# Patient Record
Sex: FEMALE | Race: WHITE | NOT HISPANIC OR LATINO | Employment: STUDENT | URBAN - METROPOLITAN AREA
[De-identification: names, ages, dates, MRNs, and addresses within clinical notes are randomized per-mention and may not be internally consistent; named-entity substitution may affect disease eponyms.]

---
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
## 2024-01-04 ENCOUNTER — OFFICE VISIT (OUTPATIENT)
Dept: OBGYN | Facility: CLINIC | Age: 19
End: 2024-01-04
Attending: INTERNAL MEDICINE
Payer: COMMERCIAL

## 2024-01-04 VITALS — BODY MASS INDEX: 19.11 KG/M2 | SYSTOLIC BLOOD PRESSURE: 104 MMHG | WEIGHT: 109.6 LBS | DIASTOLIC BLOOD PRESSURE: 70 MMHG

## 2024-01-04 DIAGNOSIS — N92.0 MENORRHAGIA WITH REGULAR CYCLE: ICD-10-CM

## 2024-01-04 DIAGNOSIS — N94.6 DYSMENORRHEA: Primary | ICD-10-CM

## 2024-01-04 LAB
ERYTHROCYTE [DISTWIDTH] IN BLOOD BY AUTOMATED COUNT: 12.8 % (ref 10–15)
HCG UR QL: NEGATIVE
HCT VFR BLD AUTO: 39 % (ref 35–47)
HGB BLD-MCNC: 13.5 G/DL (ref 11.7–15.7)
MCH RBC QN AUTO: 32.5 PG (ref 26.5–33)
MCHC RBC AUTO-ENTMCNC: 34.6 G/DL (ref 31.5–36.5)
MCV RBC AUTO: 94 FL (ref 78–100)
PLATELET # BLD AUTO: 219 10E3/UL (ref 150–450)
RBC # BLD AUTO: 4.16 10E6/UL (ref 3.8–5.2)
WBC # BLD AUTO: 10.2 10E3/UL (ref 4–11)

## 2024-01-04 PROCEDURE — 99204 OFFICE O/P NEW MOD 45 MIN: CPT | Performed by: OBSTETRICS & GYNECOLOGY

## 2024-01-04 PROCEDURE — 36415 COLL VENOUS BLD VENIPUNCTURE: CPT | Performed by: OBSTETRICS & GYNECOLOGY

## 2024-01-04 PROCEDURE — 84146 ASSAY OF PROLACTIN: CPT | Performed by: OBSTETRICS & GYNECOLOGY

## 2024-01-04 PROCEDURE — 81025 URINE PREGNANCY TEST: CPT | Performed by: OBSTETRICS & GYNECOLOGY

## 2024-01-04 PROCEDURE — 80053 COMPREHEN METABOLIC PANEL: CPT | Performed by: OBSTETRICS & GYNECOLOGY

## 2024-01-04 PROCEDURE — 85027 COMPLETE CBC AUTOMATED: CPT | Performed by: OBSTETRICS & GYNECOLOGY

## 2024-01-04 PROCEDURE — 84443 ASSAY THYROID STIM HORMONE: CPT | Performed by: OBSTETRICS & GYNECOLOGY

## 2024-01-04 RX ORDER — LEVONORGESTREL / ETHINYL ESTRADIOL AND ETHINYL ESTRADIOL 150-30(84)
1 KIT ORAL DAILY
Qty: 84 TABLET | Refills: 4 | Status: SHIPPED | OUTPATIENT
Start: 2024-01-04 | End: 2024-03-13

## 2024-01-04 NOTE — PROGRESS NOTES
"SUBJECTIVE:   CC: Menorrhagia                                               Aniyah Curry is a 18 year old  female who presents to clinic today for heavy menses and \"flare up\" of multiple symptoms.    - menarche age 14, regular monthly, moderate flow  - Summer 2023 suddenly changing super tampon ever 30 min, heavy periods, and flare of various symptoms: presyncope, palpitations, hot flashes, dizzy, purple discoloration of feet, ankle swelling, orthostatic tachycardia/bradycardia, chest pain   - orthostatic changes occur outside of premenstrual time period as well  - severe dysmenorrhea starting in 2023, started taking Advil with no help  - has not yet started the oral contraceptives prescribed by Dr. Albarado  - no lifestyle, weight, or medication changes prior to initiation of HUB  - not currently sexually active, previously used condoms    - Patient's last menstrual period was 2024.  12/5-12/10/23. Heaviest day is day 1.     Hgb 12.7 23  TSH 3.56, T4 1.67 23, seeing Endocrine outside [facility], not prescribed medication at this time. Endocrine suspect Hashimotos.    Problem list and histories reviewed & adjusted, as indicated.  Additional history: as documented.      Patient Active Problem List   Diagnosis    Other congenital deformity of hip (joint)    Family history of celiac disease    Benign joint hypermobility    Scoliosis    Seasonal allergies    PFO (patent foramen ovale)    NSVT (nonsustained ventricular tachycardia) (H)     Past Surgical History:   Procedure Laterality Date    APPENDECTOMY      ORTHOPEDIC SURGERY Left 2022    labrial tear repair      Social History     Tobacco Use    Smoking status: Never    Smokeless tobacco: Never   Substance Use Topics    Alcohol use: No      Problem (# of Occurrences) Relation (Name,Age of Onset)    Anxiety Disorder (1) Paternal Grandfather (Kobe Curry)    Unknown/Adopted (1) Mother (Radha Curry)    Anesthesia Reaction (1) Mother " (Radha Curry)    Asthma (1) Brother (Bladimir Curry)    Genetic Disorder (2) Brother (Bladimir Curry): Celiac Disease, Cousin (Stacy Cantor): JRA    Gastrointestinal Disease (1) Brother (5): celiac disease    Hypertension (1) Paternal Grandfather (Kobe Curry)    Cerebrovascular Disease (1) Maternal Grandfather (Vahid Cantor): 2014    Thyroid Disease (5) Mother (Radha Curry): Hashimoto's, Father (Lc Curry): Hypothyroidism and vitiligo, Maternal Grandmother (Laura Curry): Hypothyroidism, Paternal Grandmother (Jania Cantor): Hyposthyroidism, Brother (Bladimir Curry): Autoimmune Hypothyroidism    Breast Cancer (1) Other (Estela Cantor): Aunt    Prostate Cancer (1) Maternal Grandfather (Vahid Cantor)    Coronary Artery Disease (1) Paternal Grandfather (Kobe Curry)    Hypothyroidism (5) Mother (Radha Curry), Father (Lc Curry), Maternal Grandmother (Laura Curry), Paternal Grandmother (Jania Cantor), Brother              azelaic acid (FINACIA) 15 % external gel, APPLY THIN LAYER TO FACE 1-2X DAILY  metroNIDAZOLE (METROCREAM) 0.75 % external cream,   SULFACLEANSE 8/4 8-4 % SUSP, WASH ENTIRE FACE 1-2X DAILY. LATHER AND LET SIT 1-2 MINUTES BEFORE RINSING.  levonorgestrel-ethinyl estradiol (AVIANE) 0.1-20 MG-MCG tablet, Take 1 tablet by mouth daily (Patient not taking: Reported on 1/4/2024)    No current facility-administered medications on file prior to visit.    Allergies   Allergen Reactions    No Known Drug Allergy     Seasonal Allergies        OBJECTIVE:   /70   Wt 49.7 kg (109 lb 9.6 oz)   LMP 01/03/2024   BMI 19.11 kg/m     Const: sitting in chair in no acute distress, comfortable  Eyes: no scleral icterus, EOMI  CV: regular rate, well perfused  Pulm: no increased work of breathing, no cough  Skin: warm and dry, no rashes/lesions  Psych: mood stable, appropriate affect  Abd: soft, no hepatosplenomegaly, non-tender to palpation, no suprapubic tenderness or masses   Neuro: A+Ox3     ASSESSMENT/PLAN:                                                     Aniyah Curry is a 18 year old female [id number] for initial gyn evaluation of menorrhagia and dysmenorrhea.     1. Menorrhagia with regular cycle  We discussed the potential etiologies of AUB including hormonal changes (PCOS/oligoovulation, perimenopause), structural abnormalities (polyps, fibroids), endometriosis, and adenomyosis. Patient counselled on evaluation and potential options for treatment including hormonal management.  - Ob/Gyn  Referral  - US Pelvic Complete with Transvaginal; Future  - TSH with free T4 reflex; Future  - Prolactin; Future  - CBC with platelets; Future  - Comprehensive metabolic panel; Future  - HCG Qual, Urine ([id number]); Future  - levonorgest-eth estrad 91-Day (SEASONIQUE) 0.15-0.03 &0.01 MG tablet; Take 1 tablet by mouth daily  Dispense: 84 tablet; Refill: 4    2. Dysmenorrhea  Dysmenorrhea:    - We reviewed causes of dysmenorrhea, including prostaglandin mediated cramping, structural abnormalities (fibroids, polyps, adenomyosis), and endometriosis.   - Scheduled Ibuprofen 600mg every 4 hours or 800mg every 6 hours OR Aleve 2 tabs every 8-12 hours throughout menses and starting the night before bleeding. Additionally, heating pads to the lower abdomen can be helpful.  -  Reviewed options for hormonal birth control, including oral contraceptive pills, patches, vaginal ring, implant, shot, IUD (hormonal), as well as hysteroscopy D&C, if refractory to the above methods. We discussed potential need for diagnostic laparoscopy to evaluate for endometriosis if initial work up and treatment plan does not provide adequate control.    - she plans to proceed with seasonique to suppress menses    - will obtain pelvic US and review management options when results are available.        Brooklyn Sandoval MD  Obstetrics and Gynecology   [facility] [place]

## 2024-01-04 NOTE — NURSING NOTE
"Chief Complaint   Patient presents with    Consult     For AUB and severe cramping. Patient reports heavy bleeding started in  during period. She also had severe cramps that she went to the ER for. Reports periods last 7 days, and bleeding is heavy the first 2-3 days, and cramps are the worse the day before period starts and the 1st day. Patient does report that last month and this month her bleeding has been lighter and cramps not as strong. Is not taking any birth control right now, she was prescibed OCP but told to see Ob/gyn and cardiologist before        Initial /70   Wt 49.7 kg (109 lb 9.6 oz)   LMP 2024   BMI 19.11 kg/m   Estimated body mass index is 19.11 kg/m  as calculated from the following:    Height as of 23: 1.613 m (5' 3.5\").    Weight as of this encounter: 49.7 kg (109 lb 9.6 oz).  BP completed using cuff size: regular    Questioned patient about current smoking habits.  Pt. has never smoked.          The following HM Due: Ebony ()    Ryan Ga CMA               "

## 2024-01-04 NOTE — PATIENT INSTRUCTIONS
"Please start your pills on cycle day 4.    You can have fewer periods every year by taking your birth control pills continuously. Your pill pack is designed to be used for 91 days continuously. If you have spotting in the middle, just keep taking your pills. However, if your spotting lasts more than 5 days in a row, or turns into heavier bleeding like a period, please stop your pack for 4 days and then resume where you left off.  This allows your to \"reset\" the lining of the uterus and should hopefully reduce spotting in the next cycle.      Dysmenorrhea (painful periods)    - Causes of dysmenorrhea include prostaglandin mediated cramping, structural abnormalities (fibroids, polyps, adenomyosis), and endometriosis.   - Scheduled Ibuprofen 600mg every 4 hours or 800mg every 6 hours OR Aleve 2 tabs every 8-12 hours throughout menses and starting the night before bleeding. Additionally, heating pads to the lower abdomen can be helpful.  -  Reviewed options for hormonal birth control, including oral contraceptive pills, patches, vaginal ring, implant, shot, IUD (hormonal), as well as hysteroscopy D&C, endometrial ablation or hysterectomy, if refractory to the above methods.     - Obtain pelvic US if ongoing painful periods despite the above interventions     Follow up in 6-10w  "

## 2024-01-05 LAB
ALBUMIN SERPL BCG-MCNC: 4.2 G/DL (ref 3.5–5.2)
ALP SERPL-CCNC: 52 U/L (ref 40–150)
ALT SERPL W P-5'-P-CCNC: 11 U/L (ref 0–50)
ANION GAP SERPL CALCULATED.3IONS-SCNC: 9 MMOL/L (ref 7–15)
AST SERPL W P-5'-P-CCNC: 20 U/L (ref 0–35)
BILIRUB SERPL-MCNC: 0.2 MG/DL
BUN SERPL-MCNC: 10.3 MG/DL (ref 6–20)
CALCIUM SERPL-MCNC: 9 MG/DL (ref 8.6–10)
CHLORIDE SERPL-SCNC: 106 MMOL/L (ref 98–107)
CREAT SERPL-MCNC: 0.65 MG/DL (ref 0.51–0.95)
DEPRECATED HCO3 PLAS-SCNC: 25 MMOL/L (ref 22–29)
EGFRCR SERPLBLD CKD-EPI 2021: >90 ML/MIN/1.73M2
GLUCOSE SERPL-MCNC: 84 MG/DL (ref 70–99)
POTASSIUM SERPL-SCNC: 4.2 MMOL/L (ref 3.4–5.3)
PROLACTIN SERPL 3RD IS-MCNC: 15 NG/ML (ref 5–23)
PROT SERPL-MCNC: 7.3 G/DL (ref 6.4–8.3)
SODIUM SERPL-SCNC: 140 MMOL/L (ref 135–145)
TSH SERPL DL<=0.005 MIU/L-ACNC: 2.22 UIU/ML (ref 0.5–4.3)

## 2024-01-07 ENCOUNTER — MYC MEDICAL ADVICE (OUTPATIENT)
Dept: PEDIATRICS | Facility: CLINIC | Age: 19
End: 2024-01-07
Payer: COMMERCIAL

## 2024-01-08 NOTE — TELEPHONE ENCOUNTER
Patient was prescribed levonorgest-eth estrad 91-Day (SEASONIQUE) 0.15-0.03 &0.01 MG tablet.  Prescribed by OB/GYN Brooklyn Sandoval MD.  Indication: Menorrhagia with regular cycle    Patient wants to confirm that you are ok with this prescription.    Opal Martinez RN

## 2024-01-09 NOTE — RESULT ENCOUNTER NOTE
Results discussed directly with patient in clinic. Further details documented in the note.     Brooklyn Sandoval MD

## 2024-01-11 ENCOUNTER — ANCILLARY PROCEDURE (OUTPATIENT)
Dept: ULTRASOUND IMAGING | Facility: CLINIC | Age: 19
End: 2024-01-11
Attending: OBSTETRICS & GYNECOLOGY
Payer: COMMERCIAL

## 2024-01-11 PROCEDURE — 76856 US EXAM PELVIC COMPLETE: CPT | Performed by: FAMILY MEDICINE

## 2024-01-11 PROCEDURE — 76830 TRANSVAGINAL US NON-OB: CPT | Performed by: FAMILY MEDICINE

## 2024-01-15 ENCOUNTER — OFFICE VISIT (OUTPATIENT)
Dept: CARDIOLOGY | Facility: CLINIC | Age: 19
End: 2024-01-15
Attending: INTERNAL MEDICINE
Payer: COMMERCIAL

## 2024-01-15 VITALS
DIASTOLIC BLOOD PRESSURE: 65 MMHG | BODY MASS INDEX: 18.92 KG/M2 | OXYGEN SATURATION: 97 % | SYSTOLIC BLOOD PRESSURE: 106 MMHG | HEART RATE: 83 BPM | WEIGHT: 110.8 LBS | HEIGHT: 64 IN

## 2024-01-15 DIAGNOSIS — R42 POSTURAL DIZZINESS: ICD-10-CM

## 2024-01-15 DIAGNOSIS — R55 SYNCOPE, UNSPECIFIED SYNCOPE TYPE: ICD-10-CM

## 2024-01-15 DIAGNOSIS — R00.0 SINUS TACHYCARDIA: Primary | ICD-10-CM

## 2024-01-15 PROCEDURE — 99214 OFFICE O/P EST MOD 30 MIN: CPT | Performed by: NURSE PRACTITIONER

## 2024-01-15 NOTE — LETTER
1/15/2024    Maria Elena Albarado MD  [address] [place] [address]    RE: Aniyah Curry       Dear Colleague,     I had the pleasure of seeing Aniyah Curry in the [facility].  Cardiology Clinic Progress Note  Aniyah Curry MRN# [id number]   YOB: 2005 Age: 18 year old     Primary cardiologist: Dr. Monet     Reason for visit: follow-up     History of presenting illness:    Aniyah Curry is a pleasant 18 year old patient with past medical history significant for syncope, palpitations, incidental PFO, and NSVT, who is here for follow-up.     She was seen by EP in April 2023 for palpitations.  Zio patch demonstrated 6 beat run of VT.  It was was felt this was idiopathic, no further evaluation was recommended.    More recently, she was seen by Dr. Monet in November 2023 for syncope.  Around that time she had a witnessed syncopal event with friends, she was sitting in a high chair and just passed out abruptly without warning.  This lasted several seconds.  She had no prodromal symptoms.  Additionally, she noted significant heart rate changes from the 40s, then rapidly increasing to the 100s.  She reported staying well-hydrated and denied excess caffeine or EtOH use.  Cardiac MRI, 30-day event monitor, and tilt table were recommended for further evaluation.    I reviewed her cardiac MRI that showed normal biventricular function with an EF of 61%, no evidence of scar, fibrosis, or infiltrative disease.  Preliminary findings from her 30-day event monitor showed sinus tachycardia with max heart rate of 186 PM.  Symptoms were oftentimes correlated with sinus tachycardia, but occasionally symptoms were noted with a heart rate in the 80s or 90s.  No recurrent ventricular tachycardia.     Today the patient is here for follow-up.  She is accompanied by her mom.  She reports feeling better since her last visit.  She still has episodes of palpitations and dizziness  that occur most often when she gets up in the morning, though still lying in bed.  She does not feel the symptoms are positional.  She has had no recurrent syncopal episodes.  She plays pickle ball and volleyball without any issue.  She stays well hydrated and drinks minimal caffeine or EtOH.         Assessment and Plan:     ASSESSMENT:    Symptomatic sinus tachycardia.  No significant cardiac arrhythmia or other worrisome cardiac findings on recent testing.  Unclear etiology at this point, her thyroid function is normal and she is afebrile.  Possible inappropriate sinus tachycardia, though it appears her sinus tachycardia oftentimes occurs in the morning hours when she still laying in bed.  Nonsustained VT on initial cardiac monitor in 8/2022 without recurrence.  No apparent structural heart disease on echocardiogram.  She was seen by Dr. Plaza who labeled this as idiopathic, no further evaluation was recommended.  Incidental PFO      PLAN:     Overall, the patient feels better and the frequency of her symptoms have improved.  Nonetheless, she is scheduled to see Dr. Jiménez at the [facility] for evaluation of POTS.  She will follow-up with us as needed.  Reassurance was also provided today.         Sharon Cardenas, DNP, APRN, CNP  Page: [phone] (8a-5p M-F)    Orders this Visit:  No orders of the defined types were placed in this encounter.    No orders of the defined types were placed in this encounter.    There are no discontinued medications.    Today's clinic visit entailed:  Review of the result(s) of each unique test - echo, labs, EKG, MRI, event monitor  Ordering of each unique test  Prescription drug management  35 minutes spent by me on the date of the encounter doing chart review, review of test results, interpretation of tests, patient visit, and discussion with family   Provider  Link to [facility] Help Grid     The level of medical decision making during this visit was of moderate  "complexity.           Review of Systems:     Review of Systems:  Skin:        Eyes:       ENT:       Respiratory:  Positive for dyspnea on exertion  Cardiovascular:  Negative;syncope or near-syncope;fatigue Positive for;palpitations;lightheadedness;dizziness;edema  Gastroenterology:      Genitourinary:       Musculoskeletal:       Neurologic:  Positive for headaches  Psychiatric:       Heme/Lymph/Imm:  Positive for allergies  Endocrine:  Negative thyroid disorder;diabetes            Physical Exam:   Vitals: /65   Pulse 83   Ht 1.613 m (5' 3.5\")   Wt 50.3 kg (110 lb 12.8 oz)   LMP 01/03/2024   SpO2 97%   BMI 19.32 kg/m    Constitutional:  cooperative        Skin:  warm and dry to the touch        Head:  normocephalic        Eyes:  pupils equal and round        ENT:  not assessed this visit        Neck:  JVP normal        Chest:  clear to auscultation        Cardiac: regular rhythm, normal S1/S2, no S3 or S4, apical impulse not displaced, no murmurs, gallops or rubs                  Abdomen:  abdomen soft        Vascular: pulses full and equal                                      Extremities and Back:  no edema        Neurological:  no gross motor deficits;affect appropriate             Medications:     Current Outpatient Medications   Medication Sig Dispense Refill    azelaic acid (FINACIA) 15 % external gel APPLY THIN LAYER TO FACE 1-2X DAILY      metroNIDAZOLE (METROCREAM) 0.75 % external cream Apply topically daily      SULFACLEANSE 8/4 8-4 % SUSP WASH ENTIRE FACE 1-2X DAILY. LATHER AND LET SIT 1-2 MINUTES BEFORE RINSING.      levonorgest-eth estrad 91-Day (SEASONIQUE) 0.15-0.03 &0.01 MG tablet Take 1 tablet by mouth daily (Patient not taking: Reported on 1/15/2024) 84 tablet 4       Family History   Problem Relation Age of Onset    Hypothyroidism Mother     Unknown/Adopted Mother     Anesthesia Reaction Mother     Thyroid Disease Mother         Hashimoto's    Hypothyroidism Father     Thyroid Disease " Father         Hypothyroidism and vitiligo    Hypothyroidism Maternal Grandmother     Thyroid Disease Maternal Grandmother         Hypothyroidism    Cerebrovascular Disease Maternal Grandfather         2014    Prostate Cancer Maternal Grandfather     Hypothyroidism Paternal Grandmother     Thyroid Disease Paternal Grandmother         Hyposthyroidism    Gastrointestinal Disease Brother 5        celiac disease    Hypothyroidism Brother     Coronary Artery Disease Paternal Grandfather     Hypertension Paternal Grandfather     Anxiety Disorder Paternal Grandfather     Breast Cancer Other         Aunt    Asthma Brother     Genetic Disorder Brother         Celiac Disease    Thyroid Disease Brother         Autoimmune Hypothyroidism    Genetic Disorder Cousin         JRA       Social History     Socioeconomic History    Marital status: Single     Spouse name: Not on file    Number of children: Not on file    Years of education: Not on file    Highest education level: Not on file   Occupational History    Not on file   Tobacco Use    Smoking status: Never    Smokeless tobacco: Never   Vaping Use    Vaping Use: Never used   Substance and Sexual Activity    Alcohol use: No    Drug use: No    Sexual activity: Never   Other Topics Concern    Parent/sibling w/ CABG, MI or angioplasty before 65F 55M? No   Social History Narrative    Not on file     Social Determinants of Health     Financial Resource Strain: Low Risk  (12/19/2023)    Financial Resource Strain     Within the past 12 months, have you or your family members you live with been unable to get utilities (heat, electricity) when it was really needed?: No   Food Insecurity: Low Risk  (12/19/2023)    Food Insecurity     Within the past 12 months, did you worry that your food would run out before you got money to buy more?: No     Within the past 12 months, did the food you bought just not last and you didn t have money to get more?: No   Transportation Needs: Low Risk   (12/19/2023)    Transportation Needs     Within the past 12 months, has lack of transportation kept you from medical appointments, getting your medicines, non-medical meetings or appointments, work, or from getting things that you need?: No   Physical Activity: Not on file   Stress: Not on file   Social Connections: Not on file   Interpersonal Safety: Low Risk  (12/20/2023)    Interpersonal Safety     Do you feel physically and emotionally safe where you currently live?: Yes     Within the past 12 months, have you been hit, slapped, kicked or otherwise physically hurt by someone?: No     Within the past 12 months, have you been humiliated or emotionally abused in other ways by your partner or ex-partner?: No   Housing Stability: Low Risk  (12/19/2023)    Housing Stability     Do you have housing? : Yes     Are you worried about losing your housing?: No            Past Medical History:     Past Medical History:   Diagnosis Date    Other congenital deformity of hip (joint)     f.u.normal              Past Surgical History:     Past Surgical History:   Procedure Laterality Date    APPENDECTOMY      ORTHOPEDIC SURGERY Left 01/2022    labrial tear repair              Allergies:   No known drug allergy and Seasonal allergies       Data:   All laboratory data reviewed:    Recent Labs   Lab Test 01/04/24  1016 07/27/23  1135 01/31/23  1055   TSH 2.22 3.56 3.49       Lab Results   Component Value Date    WBC 10.2 01/04/2024    WBC 6.2 04/13/2014    RBC 4.16 01/04/2024    RBC 4.32 05/07/2013    HGB 13.5 01/04/2024    HGB 13.2 05/07/2013    HCT 39.0 01/04/2024    HCT 38.0 05/07/2013    MCV 94 01/04/2024    MCV 88 05/07/2013    MCH 32.5 01/04/2024    MCH 30.6 05/07/2013    MCHC 34.6 01/04/2024    MCHC 34.7 05/07/2013    RDW 12.8 01/04/2024    RDW 11.9 05/07/2013     01/04/2024     05/07/2013       Lab Results   Component Value Date     01/04/2024     07/03/2012    POTASSIUM 4.2 01/04/2024    POTASSIUM  "3.4 12/30/2021    POTASSIUM 4.0 07/03/2012    CHLORIDE 106 01/04/2024    CHLORIDE 105 12/30/2021    CHLORIDE 104 07/03/2012    CO2 25 01/04/2024    CO2 28 12/30/2021    CO2 26 07/03/2012    ANIONGAP 9 01/04/2024    ANIONGAP 5 12/30/2021    ANIONGAP 9 07/03/2012    GLC 84 01/04/2024    GLC 93 12/30/2021    GLC 94 07/03/2012    BUN 10.3 01/04/2024    BUN 10 12/30/2021    BUN 14 07/03/2012    CR 0.65 01/04/2024    CR 0.33 07/03/2012    GFRESTIMATED >90 01/04/2024    GFRESTIMATED GFR not calculated, patient <16 years old. 07/03/2012    GFRESTBLACK GFR not calculated, patient <16 years old. 07/03/2012    CHRISSIE 9.0 01/04/2024    CHRISSIE 9.5 07/03/2012      Lab Results   Component Value Date    AST 20 01/04/2024    AST 51 (H) 07/03/2012    ALT 11 01/04/2024    ALT 23 07/03/2012       No results found for: \"A1C\"    No results found for: \"INR\"        Thank you for allowing me to participate in the care of your patient.      Sincerely,     Sharon Cardenas, [facility]  cc:   Cintia Monet MD  [address] [place] [id number]      "

## 2024-01-15 NOTE — PROGRESS NOTES
Cardiology Clinic Progress Note  Aniyah Curry MRN# [id number]   YOB: 2005 Age: 18 year old     Primary cardiologist: Dr. Monet     Reason for visit: follow-up     History of presenting illness:    Aniyah Curry is a pleasant 18 year old patient with past medical history significant for syncope, palpitations, incidental PFO, and NSVT, who is here for follow-up.     She was seen by EP in April 2023 for palpitations.  Zio patch demonstrated 6 beat run of VT.  It was was felt this was idiopathic, no further evaluation was recommended.    More recently, she was seen by Dr. Monet in November 2023 for syncope.  Around that time she had a witnessed syncopal event with friends, she was sitting in a high chair and just passed out abruptly without warning.  This lasted several seconds.  She had no prodromal symptoms.  Additionally, she noted significant heart rate changes from the 40s, then rapidly increasing to the 100s.  She reported staying well-hydrated and denied excess caffeine or EtOH use.  Cardiac MRI, 30-day event monitor, and tilt table were recommended for further evaluation.    I reviewed her cardiac MRI that showed normal biventricular function with an EF of 61%, no evidence of scar, fibrosis, or infiltrative disease.  Preliminary findings from her 30-day event monitor showed sinus tachycardia with max heart rate of 186 PM.  Symptoms were oftentimes correlated with sinus tachycardia, but occasionally symptoms were noted with a heart rate in the 80s or 90s.  No recurrent ventricular tachycardia.     Today the patient is here for follow-up.  She is accompanied by her mom.  She reports feeling better since her last visit.  She still has episodes of palpitations and dizziness that occur most often when she gets up in the morning, though still lying in bed.  She does not feel the symptoms are positional.  She has had no recurrent syncopal episodes.  She plays pickle ball and volleyball without  any issue.  She stays well hydrated and drinks minimal caffeine or EtOH.         Assessment and Plan:     ASSESSMENT:    Symptomatic sinus tachycardia.  No significant cardiac arrhythmia or other worrisome cardiac findings on recent testing.  Unclear etiology at this point, her thyroid function is normal and she is afebrile.  Possible inappropriate sinus tachycardia, though it appears her sinus tachycardia oftentimes occurs in the morning hours when she still laying in bed.  Nonsustained VT on initial cardiac monitor in 8/2022 without recurrence.  No apparent structural heart disease on echocardiogram.  She was seen by Dr. Plaza who labeled this as idiopathic, no further evaluation was recommended.  Incidental PFO      PLAN:     Overall, the patient feels better and the frequency of her symptoms have improved.  Nonetheless, she is scheduled to see Dr. Jiménez at the [facility] for evaluation of POTS.  She will follow-up with us as needed.  Reassurance was also provided today.         Sharon Cardenas DNP, APRN, CNP  Page: [phone] (8a-5p M-F)    Orders this Visit:  No orders of the defined types were placed in this encounter.    No orders of the defined types were placed in this encounter.    There are no discontinued medications.    Today's clinic visit entailed:  Review of the result(s) of each unique test - echo, labs, EKG, MRI, event monitor  Ordering of each unique test  Prescription drug management  35 minutes spent by me on the date of the encounter doing chart review, review of test results, interpretation of tests, patient visit, and discussion with family   Provider  Link to [facility] Help Grid     The level of medical decision making during this visit was of moderate complexity.           Review of Systems:     Review of Systems:  Skin:        Eyes:       ENT:       Respiratory:  Positive for dyspnea on exertion  Cardiovascular:  Negative;syncope or near-syncope;fatigue Positive  "for;palpitations;lightheadedness;dizziness;edema  Gastroenterology:      Genitourinary:       Musculoskeletal:       Neurologic:  Positive for headaches  Psychiatric:       Heme/Lymph/Imm:  Positive for allergies  Endocrine:  Negative thyroid disorder;diabetes            Physical Exam:   Vitals: /65   Pulse 83   Ht 1.613 m (5' 3.5\")   Wt 50.3 kg (110 lb 12.8 oz)   LMP 01/03/2024   SpO2 97%   BMI 19.32 kg/m    Constitutional:  cooperative        Skin:  warm and dry to the touch        Head:  normocephalic        Eyes:  pupils equal and round        ENT:  not assessed this visit        Neck:  JVP normal        Chest:  clear to auscultation        Cardiac: regular rhythm, normal S1/S2, no S3 or S4, apical impulse not displaced, no murmurs, gallops or rubs                  Abdomen:  abdomen soft        Vascular: pulses full and equal                                      Extremities and Back:  no edema        Neurological:  no gross motor deficits;affect appropriate             Medications:     Current Outpatient Medications   Medication Sig Dispense Refill    azelaic acid (FINACIA) 15 % external gel APPLY THIN LAYER TO FACE 1-2X DAILY      metroNIDAZOLE (METROCREAM) 0.75 % external cream Apply topically daily      SULFACLEANSE 8/4 8-4 % SUSP WASH ENTIRE FACE 1-2X DAILY. LATHER AND LET SIT 1-2 MINUTES BEFORE RINSING.      levonorgest-eth estrad 91-Day (SEASONIQUE) 0.15-0.03 &0.01 MG tablet Take 1 tablet by mouth daily (Patient not taking: Reported on 1/15/2024) 84 tablet 4       Family History   Problem Relation Age of Onset    Hypothyroidism Mother     Unknown/Adopted Mother     Anesthesia Reaction Mother     Thyroid Disease Mother         Hashimoto's    Hypothyroidism Father     Thyroid Disease Father         Hypothyroidism and vitiligo    Hypothyroidism Maternal Grandmother     Thyroid Disease Maternal Grandmother         Hypothyroidism    Cerebrovascular Disease Maternal Grandfather         2014    " Prostate Cancer Maternal Grandfather     Hypothyroidism Paternal Grandmother     Thyroid Disease Paternal Grandmother         Hyposthyroidism    Gastrointestinal Disease Brother 5        celiac disease    Hypothyroidism Brother     Coronary Artery Disease Paternal Grandfather     Hypertension Paternal Grandfather     Anxiety Disorder Paternal Grandfather     Breast Cancer Other         Aunt    Asthma Brother     Genetic Disorder Brother         Celiac Disease    Thyroid Disease Brother         Autoimmune Hypothyroidism    Genetic Disorder Cousin         JRA       Social History     Socioeconomic History    Marital status: Single     Spouse name: Not on file    Number of children: Not on file    Years of education: Not on file    Highest education level: Not on file   Occupational History    Not on file   Tobacco Use    Smoking status: Never    Smokeless tobacco: Never   Vaping Use    Vaping Use: Never used   Substance and Sexual Activity    Alcohol use: No    Drug use: No    Sexual activity: Never   Other Topics Concern    Parent/sibling w/ CABG, MI or angioplasty before 65F 55M? No   Social History Narrative    Not on file     Social Determinants of Health     Financial Resource Strain: Low Risk  (12/19/2023)    Financial Resource Strain     Within the past 12 months, have you or your family members you live with been unable to get utilities (heat, electricity) when it was really needed?: No   Food Insecurity: Low Risk  (12/19/2023)    Food Insecurity     Within the past 12 months, did you worry that your food would run out before you got money to buy more?: No     Within the past 12 months, did the food you bought just not last and you didn t have money to get more?: No   Transportation Needs: Low Risk  (12/19/2023)    Transportation Needs     Within the past 12 months, has lack of transportation kept you from medical appointments, getting your medicines, non-medical meetings or appointments, work, or from getting  things that you need?: No   Physical Activity: Not on file   Stress: Not on file   Social Connections: Not on file   Interpersonal Safety: Low Risk  (12/20/2023)    Interpersonal Safety     Do you feel physically and emotionally safe where you currently live?: Yes     Within the past 12 months, have you been hit, slapped, kicked or otherwise physically hurt by someone?: No     Within the past 12 months, have you been humiliated or emotionally abused in other ways by your partner or ex-partner?: No   Housing Stability: Low Risk  (12/19/2023)    Housing Stability     Do you have housing? : Yes     Are you worried about losing your housing?: No            Past Medical History:     Past Medical History:   Diagnosis Date    Other congenital deformity of hip (joint)     f.u.normal              Past Surgical History:     Past Surgical History:   Procedure Laterality Date    APPENDECTOMY      ORTHOPEDIC SURGERY Left 01/2022    labrial tear repair              Allergies:   No known drug allergy and Seasonal allergies       Data:   All laboratory data reviewed:    Recent Labs   Lab Test 01/04/24  1016 07/27/23  1135 01/31/23  1055   TSH 2.22 3.56 3.49       Lab Results   Component Value Date    WBC 10.2 01/04/2024    WBC 6.2 04/13/2014    RBC 4.16 01/04/2024    RBC 4.32 05/07/2013    HGB 13.5 01/04/2024    HGB 13.2 05/07/2013    HCT 39.0 01/04/2024    HCT 38.0 05/07/2013    MCV 94 01/04/2024    MCV 88 05/07/2013    MCH 32.5 01/04/2024    MCH 30.6 05/07/2013    MCHC 34.6 01/04/2024    MCHC 34.7 05/07/2013    RDW 12.8 01/04/2024    RDW 11.9 05/07/2013     01/04/2024     05/07/2013       Lab Results   Component Value Date     01/04/2024     07/03/2012    POTASSIUM 4.2 01/04/2024    POTASSIUM 3.4 12/30/2021    POTASSIUM 4.0 07/03/2012    CHLORIDE 106 01/04/2024    CHLORIDE 105 12/30/2021    CHLORIDE 104 07/03/2012    CO2 25 01/04/2024    CO2 28 12/30/2021    CO2 26 07/03/2012    ANIONGAP 9 01/04/2024     "ANIONGAP 5 12/30/2021    ANIONGAP 9 07/03/2012    GLC 84 01/04/2024    GLC 93 12/30/2021    GLC 94 07/03/2012    BUN 10.3 01/04/2024    BUN 10 12/30/2021    BUN 14 07/03/2012    CR 0.65 01/04/2024    CR 0.33 07/03/2012    GFRESTIMATED >90 01/04/2024    GFRESTIMATED GFR not calculated, patient <16 years old. 07/03/2012    GFRESTBLACK GFR not calculated, patient <16 years old. 07/03/2012    CHRISSIE 9.0 01/04/2024    CHRISSIE 9.5 07/03/2012      Lab Results   Component Value Date    AST 20 01/04/2024    AST 51 (H) 07/03/2012    ALT 11 01/04/2024    ALT 23 07/03/2012       No results found for: \"A1C\"    No results found for: \"INR\"      "

## 2024-01-15 NOTE — PATIENT INSTRUCTIONS
Today's Plan:     - Follow-up with cardiology as needed.   - See Dr. Jiménez in March, as planned.     Migdalia RN ([phone]), Ashley RN ([phone]), and Nova RN ([phone])    After Hours: [phone], option #2, ask for cardiologist on-call    Scheduling phone number: [phone]    Reminder: Please bring in all current medications, over the counter supplements and vitamin bottles to your next appointment.-    It was a pleasure seeing you today!     Sharon Cardenas, CNP  1/15/2024    -

## 2024-01-19 ENCOUNTER — TELEPHONE (OUTPATIENT)
Dept: CARDIOLOGY | Facility: CLINIC | Age: 19
End: 2024-01-19
Payer: COMMERCIAL

## 2024-02-26 ENCOUNTER — TELEPHONE (OUTPATIENT)
Dept: OBGYN | Facility: CLINIC | Age: 19
End: 2024-02-26
Payer: COMMERCIAL

## 2024-02-26 NOTE — TELEPHONE ENCOUNTER
Returned call to patient, advised.    Patient already has a scheduled appointment for 3/6/24.    Franca JONAS RN

## 2024-02-26 NOTE — TELEPHONE ENCOUNTER
Pt calling.   Last OV 1/4/24. She was started on ocp for menorrhagia.   States that since starting she has been having spotting/bleeding almost daily.     States that she did stop the pills for 4 days, as instructed, and then restarted them (this was a few weeks ago), but she is still having irregular bleeding/spotting.     Please advise.    Lisandra BOWERS RN

## 2024-02-27 ENCOUNTER — HOSPITAL ENCOUNTER (EMERGENCY)
Facility: CLINIC | Age: 19
Discharge: HOME | End: 2024-02-27
Attending: STUDENT IN AN ORGANIZED HEALTH CARE EDUCATION/TRAINING PROGRAM | Admitting: STUDENT IN AN ORGANIZED HEALTH CARE EDUCATION/TRAINING PROGRAM
Payer: COMMERCIAL

## 2024-02-27 ENCOUNTER — MYC MEDICAL ADVICE (OUTPATIENT)
Dept: CARDIOLOGY | Facility: CLINIC | Age: 19
End: 2024-02-27
Payer: COMMERCIAL

## 2024-02-27 ENCOUNTER — MYC MEDICAL ADVICE (OUTPATIENT)
Dept: PEDIATRICS | Facility: CLINIC | Age: 19
End: 2024-02-27
Payer: COMMERCIAL

## 2024-02-27 VITALS
DIASTOLIC BLOOD PRESSURE: 85 MMHG | TEMPERATURE: 99.2 F | SYSTOLIC BLOOD PRESSURE: 122 MMHG | RESPIRATION RATE: 16 BRPM | OXYGEN SATURATION: 98 % | HEART RATE: 66 BPM

## 2024-02-27 DIAGNOSIS — R55 SYNCOPE, UNSPECIFIED SYNCOPE TYPE: Primary | ICD-10-CM

## 2024-02-27 DIAGNOSIS — R55 SYNCOPE: ICD-10-CM

## 2024-02-27 DIAGNOSIS — R42 POSTURAL DIZZINESS: ICD-10-CM

## 2024-02-27 LAB
ANION GAP SERPL CALCULATED.3IONS-SCNC: 11 MMOL/L (ref 7–15)
ATRIAL RATE - MUSE: 75 BPM
BASOPHILS # BLD AUTO: 0 10E3/UL (ref 0–0.2)
BASOPHILS NFR BLD AUTO: 1 %
BUN SERPL-MCNC: 10.2 MG/DL (ref 6–20)
CALCIUM SERPL-MCNC: 9.1 MG/DL (ref 8.6–10)
CHLORIDE SERPL-SCNC: 103 MMOL/L (ref 98–107)
CREAT SERPL-MCNC: 0.75 MG/DL (ref 0.51–0.95)
DEPRECATED HCO3 PLAS-SCNC: 23 MMOL/L (ref 22–29)
DIASTOLIC BLOOD PRESSURE - MUSE: NORMAL MMHG
EGFRCR SERPLBLD CKD-EPI 2021: >90 ML/MIN/1.73M2
EOSINOPHIL # BLD AUTO: 0 10E3/UL (ref 0–0.7)
EOSINOPHIL NFR BLD AUTO: 1 %
ERYTHROCYTE [DISTWIDTH] IN BLOOD BY AUTOMATED COUNT: 12.2 % (ref 10–15)
GLUCOSE SERPL-MCNC: 98 MG/DL (ref 70–99)
HCT VFR BLD AUTO: 39.3 % (ref 35–47)
HGB BLD-MCNC: 13.3 G/DL (ref 11.7–15.7)
IMM GRANULOCYTES # BLD: 0 10E3/UL
IMM GRANULOCYTES NFR BLD: 0 %
INTERPRETATION ECG - MUSE: NORMAL
LYMPHOCYTES # BLD AUTO: 2.6 10E3/UL (ref 0.8–5.3)
LYMPHOCYTES NFR BLD AUTO: 34 %
MCH RBC QN AUTO: 31.5 PG (ref 26.5–33)
MCHC RBC AUTO-ENTMCNC: 33.8 G/DL (ref 31.5–36.5)
MCV RBC AUTO: 93 FL (ref 78–100)
MONOCYTES # BLD AUTO: 0.6 10E3/UL (ref 0–1.3)
MONOCYTES NFR BLD AUTO: 8 %
NEUTROPHILS # BLD AUTO: 4.4 10E3/UL (ref 1.6–8.3)
NEUTROPHILS NFR BLD AUTO: 56 %
NRBC # BLD AUTO: 0 10E3/UL
NRBC BLD AUTO-RTO: 0 /100
P AXIS - MUSE: 66 DEGREES
PLATELET # BLD AUTO: 258 10E3/UL (ref 150–450)
POTASSIUM SERPL-SCNC: 3.6 MMOL/L (ref 3.4–5.3)
PR INTERVAL - MUSE: 142 MS
QRS DURATION - MUSE: 78 MS
QT - MUSE: 374 MS
QTC - MUSE: 417 MS
R AXIS - MUSE: 74 DEGREES
RBC # BLD AUTO: 4.22 10E6/UL (ref 3.8–5.2)
SODIUM SERPL-SCNC: 137 MMOL/L (ref 135–145)
SYSTOLIC BLOOD PRESSURE - MUSE: NORMAL MMHG
T AXIS - MUSE: 32 DEGREES
VENTRICULAR RATE- MUSE: 75 BPM
WBC # BLD AUTO: 7.7 10E3/UL (ref 4–11)

## 2024-02-27 PROCEDURE — 36415 COLL VENOUS BLD VENIPUNCTURE: CPT | Performed by: STUDENT IN AN ORGANIZED HEALTH CARE EDUCATION/TRAINING PROGRAM

## 2024-02-27 PROCEDURE — 80048 BASIC METABOLIC PNL TOTAL CA: CPT | Performed by: STUDENT IN AN ORGANIZED HEALTH CARE EDUCATION/TRAINING PROGRAM

## 2024-02-27 PROCEDURE — 99284 EMERGENCY DEPT VISIT MOD MDM: CPT | Mod: 25

## 2024-02-27 PROCEDURE — 93005 ELECTROCARDIOGRAM TRACING: CPT

## 2024-02-27 PROCEDURE — 96360 HYDRATION IV INFUSION INIT: CPT

## 2024-02-27 PROCEDURE — 258N000003 HC RX IP 258 OP 636: Performed by: STUDENT IN AN ORGANIZED HEALTH CARE EDUCATION/TRAINING PROGRAM

## 2024-02-27 PROCEDURE — 85025 COMPLETE CBC W/AUTO DIFF WBC: CPT | Performed by: STUDENT IN AN ORGANIZED HEALTH CARE EDUCATION/TRAINING PROGRAM

## 2024-02-27 RX ADMIN — SODIUM CHLORIDE 1000 ML: 9 INJECTION, SOLUTION INTRAVENOUS at 20:48

## 2024-02-27 ASSESSMENT — COLUMBIA-SUICIDE SEVERITY RATING SCALE - C-SSRS
6. HAVE YOU EVER DONE ANYTHING, STARTED TO DO ANYTHING, OR PREPARED TO DO ANYTHING TO END YOUR LIFE?: NO
1. IN THE PAST MONTH, HAVE YOU WISHED YOU WERE DEAD OR WISHED YOU COULD GO TO SLEEP AND NOT WAKE UP?: NO
2. HAVE YOU ACTUALLY HAD ANY THOUGHTS OF KILLING YOURSELF IN THE PAST MONTH?: NO

## 2024-02-27 ASSESSMENT — ACTIVITIES OF DAILY LIVING (ADL): ADLS_ACUITY_SCORE: 35

## 2024-02-27 NOTE — TELEPHONE ENCOUNTER
"Patient replied.  ED recommendations: \"I would like you to schedule a follow-up appointment with the cardiology clinic and I placed a referral with them that you may be able to get tilt table testing done sooner. I also would like you to talk with your doctor on Wednesday about midodrine as this is a potential medication that could be helpful for you but this is typically done under the guidance of either your primary or the cardiologist so they can follow more closely with you. Please keep up with hydration and keep a diet with salt to help prevent low blood pressure and heart rate issues. If you develop any new or worsening symptoms, call your primary doctor or return to the ER.\"    Patient has discussed this with cardiology and needs an appointment with Dr Jiménez for a 30 day heart monitor.    Message to patient, waiting reply-TILT table test was recommended by the ED as well.  Checking on this with patient.    Opal Martinez RN    "

## 2024-02-27 NOTE — TELEPHONE ENCOUNTER
Routed to PCP-please review my note below outlining the communication with patient.  Regarding TILT table test, she will need a referral at the appointment tomorrow.  Opal Martinez RN

## 2024-02-27 NOTE — TELEPHONE ENCOUNTER
[employer] message update received from patient. Patient has visit with PMD tomorrow 2/28/24. See excerpt from ED note below regarding ED recommendations. RN will send to QUIRINO coronado.         Ferny Monet-  Just wanted to let you know I had to go into the ER during the night. I had a wisdom similar to the one where I ended up in [facility]. I did end up fainting in the ER too. My heart rate was dropping to low 40s. Earlier yesterday my heart rate had shot up to 174 while I was sitting in class. I got confused with lightheadedness and headaches last night and went in. Just wanted to update you on this.   [facility]      ED note excerpt 2/27/24  I would like you to schedule a follow-up appointment with the cardiology clinic and I placed a referral with them that you may be able to get tilt table testing done sooner. I also would like you to talk with your doctor on Wednesday about midodrine as this is a potential medication that could be helpful for you but this is typically done under the guidance of either your primary or the cardiologist so they can follow more closely with you. Please keep up with hydration and keep a diet with salt to help prevent low blood pressure and heart rate issues. If you develop any new or worsening symptoms, call your primary doctor or return to the ER.

## 2024-02-27 NOTE — TELEPHONE ENCOUNTER
Pt has visit with Dr. Albarado tomorrow.  Can you respond to see if she has any immediate needs?  MARC Seogvia, CNP

## 2024-02-27 NOTE — TELEPHONE ENCOUNTER
ED notes reviewed.  Appointment scheduled for tomorrow  Message to patient to inquire about symptoms today.  Patient sent a message to cardiology as well.  Appointment tomorrow, but advised ED evaluation again if symptoms get worse.  Opal Martinez RN

## 2024-02-27 NOTE — TELEPHONE ENCOUNTER
RN updated patient via [employer].       I would recommend trying to get in with Dr. Jiménez earlier than May, if possible. We can certainly place another monitor. I would recommend 30 day event monitor for further evaluation.   QUIRINO Sharon

## 2024-02-28 ENCOUNTER — TELEPHONE (OUTPATIENT)
Dept: PEDIATRICS | Facility: CLINIC | Age: 19
End: 2024-02-28

## 2024-02-28 NOTE — TELEPHONE ENCOUNTER
Add on-chart review this morning-patient presented to the ED again for syncopal episode and lightheadedness.  A 30-day heart monitor is scheduled for 2/29 and appointment with cardiology is scheduled.  TILT table in May. Per ED recommendations to patient, she should see cardiology sooner. Patient has reached out to cardiology to be seen sooner.      Patient has an appointment today but provider is not available today.  The TC team will work no rescheduling the appointment.  Per ED notes, patient to be seen within 7 days.  Opal Martinez RN

## 2024-02-28 NOTE — ED TRIAGE NOTES
Patient was seen in the [place] ED yesterday. Was told to come to ED if symptoms persist. She has had some possible syncopal episode and had another one while on her way here today. She has follow up with cardiology pending. Able to walk back to ED room without difficulty.      Triage Assessment (Adult)       Row Name 02/27/24 2026          Triage Assessment    Airway WDL WDL        Respiratory WDL    Respiratory WDL WDL        Skin Circulation/Temperature WDL    Skin Circulation/Temperature WDL WDL        Cardiac WDL    Cardiac WDL X;all  dizziness

## 2024-02-28 NOTE — DISCHARGE INSTRUCTIONS
Ultimately seeing a specialist is what is needed at this point.  Ongoing symptoms.  Please try to call the cardiologist to see if they can get you in for a sooner appointment in person until testing.  There further workup will ultimately be able to help guide treatment options that may be necessary for you.  Continue to keep up with hydration and avoiding any possible triggers that may be evident for ongoing fainting episodes.

## 2024-02-28 NOTE — TELEPHONE ENCOUNTER
Called the Pt spoke with her about rescheduling. She said she'd reschedule at a later date.  Meagan Silver MA

## 2024-02-28 NOTE — ED PROVIDER NOTES
History     Chief Complaint:  Syncope       HPI   Aniyah Curry is a 18 year old female who presents with lightheadedness and syncopal episode.  Patient was seen in the [place] ER last night for similar symptoms.  She is currently being seen by cardiology and has plans for tilt testing workup this upcoming May.  Appointment was not able to be scheduled sooner.  Patient states that she was discharged from the ER and instructed to try and get a sooner appointment.  Today, she developed lightheadedness and feeling unwell.  On the drive here she reports she had a syncopal episode with complete loss of consciousness.  She spoke with her primary regarding her symptoms today and they instructed her to come to ER.      Independent Historian:   None - Patient Only    Review of External Notes:   Reviewed ER note from yesterday  Reviewed cardiology notes from today with order for 30 event monitor       Medications:    azelaic acid (FINACIA) 15 % external gel  levonorgest-eth estrad 91-Day (SEASONIQUE) 0.15-0.03 &0.01 MG tablet  metroNIDAZOLE (METROCREAM) 0.75 % external cream  SULFACLEANSE 8/4 8-4 % SUSP        Past Medical History:    Past Medical History:   Diagnosis Date    Other congenital deformity of hip (joint)        Past Surgical History:    Past Surgical History:   Procedure Laterality Date    APPENDECTOMY      ORTHOPEDIC SURGERY Left 01/2022    labrial tear repair        Physical Exam   Patient Vitals for the past 24 hrs:   BP Temp Temp src Pulse Resp SpO2   02/27/24 2142 122/85 -- -- 66 -- 98 %   02/27/24 2100 -- -- -- 67 16 98 %   02/27/24 2051 -- -- -- 72 17 100 %   02/27/24 2026 130/81 99.2  F (37.3  C) Oral -- 27 99 %        Physical Exam  General: Alert and cooperative with exam. Patient in no apparent distress. Normal mentation.  Head:  Scalp is NC/AT  Eyes:  No scleral icterus, PERRL  ENT:  The external nose and ears are normal.  Neck:  Normal range of motion without rigidity.  CV:  Regular rate  and rhythm    No pathologic murmur   Resp:  Breath sounds are clear bilaterally    Non-labored, no retractions or accessory muscle use  GI:  Abdomen is soft, no distension, no tenderness. No peritoneal signs  MS:  No lower extremity edema   Skin:  Warm and dry, No rash or lesions noted.  Neuro:  Oriented x 3. No gross motor deficits.        Emergency Department Course     ECG results from 02/27/24   EKG 12 lead     Value    Systolic Blood Pressure     Diastolic Blood Pressure     Ventricular Rate 75    Atrial Rate 75    DE Interval 142    QRS Duration 78        QTc 417    P Axis 66    R AXIS 74    T Axis 32    Interpretation ECG      Sinus rhythm  Normal ECG  When compared with ECG of 29-NOV-2023 10:27,  No significant change was found  Confirmed by GENERATED REPORT, COMPUTER ([id number]),  Migdalia Souza ([id number]) on 2/27/2024 8:41:58 PM       Laboratory:  Labs Ordered and Resulted from Time of ED Arrival to Time of ED Departure   BASIC METABOLIC PANEL - Normal       Result Value    Sodium 137      Potassium 3.6      Chloride 103      Carbon Dioxide (CO2) 23      Anion Gap 11      Urea Nitrogen 10.2      Creatinine 0.75      GFR Estimate >90      Calcium 9.1      Glucose 98     CBC WITH PLATELETS AND DIFFERENTIAL    WBC Count 7.7      RBC Count 4.22      Hemoglobin 13.3      Hematocrit 39.3      MCV 93      MCH 31.5      MCHC 33.8      RDW 12.2      Platelet Count 258      % Neutrophils 56      % Lymphocytes 34      % Monocytes 8      % Eosinophils 1      % Basophils 1      % Immature Granulocytes 0      NRBCs per 100 WBC 0      Absolute Neutrophils 4.4      Absolute Lymphocytes 2.6      Absolute Monocytes 0.6      Absolute Eosinophils 0.0      Absolute Basophils 0.0      Absolute Immature Granulocytes 0.0      Absolute NRBCs 0.0          Procedures   None    Emergency Department Course & Assessments:    Interventions:  Medications   sodium chloride 0.9% BOLUS 1,000 mL (0 mLs Intravenous Stopped 2/27/24 2132)       Independent Interpretation (X-rays, CTs, rhythm strip):  None    Consultations/Discussion of Management or Tests:  None        Social Determinants of Health affecting care:   None    Disposition:  The patient was discharged.     Impression & Plan    CMS Diagnoses: None      Medical Decision Making:  Aniyah Curry is a 18 year old female who presents with syncopal episode.  She is currently being worked up for POTS with her cardiologist, appointment made for today however working to schedule this sooner.  She has 30-day event monitor scheduled for placement to this upcoming Thursday.  Patient was seen in the ER in [place] yesterday for similar symptoms, reassuring workup at that time.  She was discharged home with recommendations to follow-up with her cardiologist.  Today, patient experienced repeat symptoms as yesterday along with syncopal episode on her drive here.  She denies any chest pain.  Reports significant heart rate fluctuations yesterday but did not notice this today.  On exam and during her time here in the ED, heart rate is normal and she is vitally stable.  Positional blood pressures and heart rate were monitored and there is no evidence of orthostatic hypotension.  Heart rate did not very with ambulation.  Provided patient with IV fluids and recheck basic labs.  Labs remain unremarkable.  EKG repeated here today and remains normal without evidence of arrhythmia or irregular heartbeats. Discussed with patient that cardiology workup is what is indicated at this time and that she needs to speak with her cardiologist in the next few days to see if appointment can be moved up sooner.  Follow-up with heart monitor as scheduled at this upcoming Thursday.  Follow-up with PCP within the week for reassessment.  She is safe for discharge home.      Diagnosis:    ICD-10-CM    1. Syncope  R55            Discharge Medications:  Discharge Medication List as of 2/27/2024  9:34 PM             2/27/2024    Smitha Balderrama PA-C Snell, Lauren R, PA-C  02/27/24 [id number]

## 2024-02-28 NOTE — TELEPHONE ENCOUNTER
Will leave for PCP to review.  Need ER follow-up visit in the next 1 week, can be virtual. Visit today was cancelled.

## 2024-02-29 ENCOUNTER — HOSPITAL ENCOUNTER (OUTPATIENT)
Dept: CARDIOLOGY | Facility: CLINIC | Age: 19
Discharge: HOME | End: 2024-02-29
Attending: NURSE PRACTITIONER | Admitting: NURSE PRACTITIONER
Payer: COMMERCIAL

## 2024-02-29 DIAGNOSIS — R55 SYNCOPE, UNSPECIFIED SYNCOPE TYPE: ICD-10-CM

## 2024-02-29 DIAGNOSIS — R42 POSTURAL DIZZINESS: ICD-10-CM

## 2024-02-29 PROCEDURE — 93270 REMOTE 30 DAY ECG REV/REPORT: CPT

## 2024-02-29 PROCEDURE — 93272 ECG/REVIEW INTERPRET ONLY: CPT | Performed by: INTERNAL MEDICINE

## 2024-03-01 NOTE — TELEPHONE ENCOUNTER
Schedule appointment with extender for next week for ED follow up. She can discuss concerns at that time. Also, I see she has cards appointment in March.   Gayla Sanderson PA-C

## 2024-03-05 ENCOUNTER — OFFICE VISIT (OUTPATIENT)
Dept: PEDIATRICS | Facility: CLINIC | Age: 19
End: 2024-03-05
Payer: COMMERCIAL

## 2024-03-05 VITALS
HEIGHT: 63 IN | TEMPERATURE: 98.9 F | DIASTOLIC BLOOD PRESSURE: 70 MMHG | BODY MASS INDEX: 20.71 KG/M2 | HEART RATE: 82 BPM | SYSTOLIC BLOOD PRESSURE: 113 MMHG | OXYGEN SATURATION: 98 % | WEIGHT: 116.9 LBS | RESPIRATION RATE: 18 BRPM

## 2024-03-05 DIAGNOSIS — R55 SYNCOPE, UNSPECIFIED SYNCOPE TYPE: Primary | ICD-10-CM

## 2024-03-05 PROCEDURE — 99213 OFFICE O/P EST LOW 20 MIN: CPT | Performed by: PHYSICIAN ASSISTANT

## 2024-03-05 NOTE — PROGRESS NOTES
"  Assessment & Plan     Syncope, unspecified syncope type      Patient is improving and does not have any new symptoms.  She has a history of syncope and heart arrhthymias for which she is working with cardiology.  She should continue the 30 day event monitor and followup as advised.  She should seek immediate care if she develops any concerns or symptoms.  Today she is improved and denies any syncope since the ED visits.  She is well appearing on exam with normal vitals.  Patient understands and agrees with the plan today.        MED REC REQUIRED  Post Medication Reconciliation Status:       Katty Fermin is a 18 year old, presenting for the following health issues:  Hospital F/U        3/5/2024     2:03 PM   Additional Questions   Roomed by miss   Accompanied by self         3/5/2024     2:03 PM   Patient Reported Additional Medications   Patient reports taking the following new medications no     HPI       ED/UC Followup:    Facility:  [facility] Emergency Dept   Date of visit: 2/27/2024  Reason for visi/t: syncope  /Current Status: better      Patient is here for a hospital followup.  She was seen in the ED on back to back days in February for syncope.  She states that she has a history of fainting and she is currently wearing a 30 day heart monitor.  SHe has not had any new symptoms or concerns since the two back to back ED visits.  She states that she has not fainted again either.         Review of Systems  Constitutional, neuro, ENT, endocrine, pulmonary, cardiac, gastrointestinal, genitourinary, musculoskeletal, integument and psychiatric systems are negative, except as otherwise noted.      Objective    /70 (BP Location: Right arm, Patient Position: Sitting, Cuff Size: Adult Small)   Pulse 82   Temp 98.9  F (37.2  C) (Temporal)   Resp 18   Ht 1.61 m (5' 3.39\")   Wt 53 kg (116 lb 14.4 oz)   LMP 02/25/2024 (Exact Date)   SpO2 98%   BMI 20.46 kg/m    Body mass index is 20.46 " kg/m .  Physical Exam   GENERAL: alert and no distress  EYES: Eyes grossly normal to inspection, PERRL and conjunctivae and sclerae normal  NECK: no adenopathy, no asymmetry, masses, or scars  RESP: lungs clear to auscultation - no rales, rhonchi or wheezes  CV: regular rate and rhythm, normal S1 S2, no S3 or S4, no murmur, click or rub, no peripheral edema  ABDOMEN: soft, nontender, no hepatosplenomegaly, no masses and bowel sounds normal  MS: no gross musculoskeletal defects noted, no edema          Signed Electronically by: Lona Cuello PA-C

## 2024-03-06 ENCOUNTER — OFFICE VISIT (OUTPATIENT)
Dept: OBGYN | Facility: CLINIC | Age: 19
End: 2024-03-06
Payer: COMMERCIAL

## 2024-03-06 VITALS
HEIGHT: 64 IN | SYSTOLIC BLOOD PRESSURE: 118 MMHG | WEIGHT: 118 LBS | DIASTOLIC BLOOD PRESSURE: 68 MMHG | BODY MASS INDEX: 20.14 KG/M2

## 2024-03-06 DIAGNOSIS — N94.6 DYSMENORRHEA: ICD-10-CM

## 2024-03-06 DIAGNOSIS — N92.0 MENORRHAGIA WITH REGULAR CYCLE: ICD-10-CM

## 2024-03-06 DIAGNOSIS — N92.1 BREAKTHROUGH BLEEDING ON BIRTH CONTROL PILLS: Primary | ICD-10-CM

## 2024-03-06 PROCEDURE — 96372 THER/PROPH/DIAG INJ SC/IM: CPT | Performed by: OBSTETRICS & GYNECOLOGY

## 2024-03-06 PROCEDURE — 99214 OFFICE O/P EST MOD 30 MIN: CPT | Mod: 25 | Performed by: OBSTETRICS & GYNECOLOGY

## 2024-03-06 RX ORDER — MEDROXYPROGESTERONE ACETATE 150 MG/ML
150 INJECTION, SUSPENSION INTRAMUSCULAR
Status: DISCONTINUED | OUTPATIENT
Start: 2024-03-06 | End: 2024-05-05

## 2024-03-06 RX ADMIN — MEDROXYPROGESTERONE ACETATE 150 MG: 150 INJECTION, SUSPENSION INTRAMUSCULAR at 14:44

## 2024-03-06 NOTE — PROGRESS NOTES
Clinic Administered Medication Documentation      Patient was given Depo Provera. Prior to medication administration, verified patient's identity using patient s name and date of birth. Please see MAR and medication order for additional information. Patient instructed to remain in clinic for 15 minutes and report any adverse reaction to staff immediately.    Vial/Syringe: Single dose vial. Was entire vial of medication used? Yes    Patient was given a card and advised of next injection dates.  NEXT INJECTION DUE: 5/22/24 - 6/19/24      Diaz Alvarez CMA

## 2024-03-06 NOTE — PROGRESS NOTES
SUBJECTIVE:   CC: spotting on oral contraceptives, dysmenorrhea and menorrhagia                                                Aniyah Curry is a 18 year old  female who presents to clinic today for follow up of dysmenorrhea and menorrhagia. She started oral contraceptives  and is currently on her second pack. Started spotting 2 weeks into her pill pack and took a 4 day pill break on  because her bleeding was picking up. She describes LMP 24, had clots days 8-10 and is still having spotting, essentially on day 12 of bleeding. Periods are lighter, more of a moderate flow.   Previously periods lasted 7 days.    Component      Latest Ref Rng 2024  10:16 AM 2024  10:21 AM   Sodium      135 - 145 mmol/L 140     Potassium      3.4 - 5.3 mmol/L 4.2     Carbon Dioxide (CO2)      22 - 29 mmol/L 25     Anion Gap      7 - 15 mmol/L 9     Urea Nitrogen      6.0 - 20.0 mg/dL 10.3     Creatinine      0.51 - 0.95 mg/dL 0.65     GFR Estimate      >60 mL/min/1.73m2 >90     Calcium      8.6 - 10.0 mg/dL 9.0     Chloride      98 - 107 mmol/L 106     Glucose      70 - 99 mg/dL 84     Alkaline Phosphatase      40 - 150 U/L 52     AST      0 - 35 U/L 20     ALT      0 - 50 U/L 11     Protein Total      6.4 - 8.3 g/dL 7.3     Albumin      3.5 - 5.2 g/dL 4.2     Bilirubin Total      <=1.2 mg/dL 0.2     WBC      4.0 - 11.0 10e3/uL 10.2     RBC Count      3.80 - 5.20 10e6/uL 4.16     Hemoglobin      11.7 - 15.7 g/dL 13.5     Hematocrit      35.0 - 47.0 % 39.0     MCV      78 - 100 fL 94     MCH      26.5 - 33.0 pg 32.5     MCHC      31.5 - 36.5 g/dL 34.6     RDW      10.0 - 15.0 % 12.8     Platelet Count      150 - 450 10e3/uL 219     TSH      0.50 - 4.30 uIU/mL 2.22     Prolactin      5 - 23 ng/mL 15     HCG Qual Urine      Negative   Negative      Pelvic US 24  Indications for ultrasound:   Bleeding/Menses - Menorrhagia (heavy menses)     LMP: 2024    Hormones: none     Measurements:  Uterus:   "6.9x 4.3 x 2.8 cm     Position is anteverted.  Contour is smooth/regular.     Endo cav: 3.31 mm       Smooth/regular/wnl     Right ovary: 2.7 x 2.1 x 1.18 cm  Wnl  Left ovary:   4.0 x 2.2 x 2.1 cm Wnl     Cul de sac: free fluid - 1.1 x 0.8 cm     Technique: Transvaginal Imaging performed  Transabdominal Imaging performed     ===================================  Complete pelvic ultrasound using realtime   transabdominal and transvaginal scanning  Bladder appears normal         Normal uterus  Normal bilateral adnexa.     Normal pelvic ultrasound study.       Problem list and histories reviewed & adjusted, as indicated.  Additional history: as documented.       azelaic acid (FINACIA) 15 % external gel, APPLY THIN LAYER TO FACE 1-2X DAILY  levonorgest-eth estrad 91-Day (SEASONIQUE) 0.15-0.03 &0.01 MG tablet, Take 1 tablet by mouth daily  metroNIDAZOLE (METROCREAM) 0.75 % external cream, Apply topically daily  SULFACLEANSE 8/4 8-4 % SUSP, WASH ENTIRE FACE 1-2X DAILY. LATHER AND LET SIT 1-2 MINUTES BEFORE RINSING.    No current facility-administered medications on file prior to visit.    Allergies   Allergen Reactions    No Known Drug Allergy     Seasonal Allergies        OBJECTIVE:   /68 (BP Location: Right arm, Patient Position: Sitting, Cuff Size: Adult Regular)   Ht 1.613 m (5' 3.5\")   Wt 53.5 kg (118 lb)   LMP 2024 (Exact Date)   BMI 20.57 kg/m     Const: sitting in chair in no acute distress, comfortable  Pulm: no increased work of breathing, no cough  Psych: mood stable, appropriate affect  Neuro: A+Ox3     ASSESSMENT/PLAN:                                                    Aniyah Curry is a 18 year old female  here for follow up of combined oral contraceptives start for menorrhagia and dysmenorrhea. Periods are lighter now, but she is experiencing breakthrough bleeding and prolonged bleeding. She would like to change methods of management. Discussed option for alternative oral " contraceptives vs trial of depo provera, which she is interested in. Reviewed risk, benefits, and alternatives.    1. Menorrhagia with regular cycle  - normal US, normal lab work.   - medroxyPROGESTERone (DEPO-PROVERA) injection 150 mg    2. Breakthrough bleeding on birth control pills  - discontinue oral contraceptives, switch to depo provera today.    - urine pregnancy test not indicated, not sexually active, on oral contraceptives at this time.   - medroxyPROGESTERone (DEPO-PROVERA) injection 150 mg    3. Dysmenorrhea  - medroxyPROGESTERone (DEPO-PROVERA) injection 150 mg     Return in 3 months to review symptoms and response to treatment.     Brooklyn Sandoval MD  Obstetrics and Gynecology   [facility] [place]

## 2024-03-06 NOTE — PATIENT INSTRUCTIONS
Learning About Birth Control: The Shot  What is the shot?     The shot is used to prevent pregnancy. You get the shot in your upper arm or rear end (buttocks). The shot gives you a dose of the hormone progestin. The shot is often called by its brand name, Depo-Provera.  Progestin prevents pregnancy in these ways: It thickens the mucus in the cervix. This makes it hard for sperm to travel into the uterus. It also thins the lining of the uterus, which makes it harder for a fertilized egg to attach to the uterus. Progestin can sometimes stop the ovaries from releasing an egg each month (ovulation).  The shot provides birth control for 3 months at a time. You then need another shot.  The shot may cause bone loss. Talk to your doctor about the risks and benefits.  How well does it work?   When the shot is used exactly as directed, it is more than 99% effective for preventing pregnancy. That means that fewer than 1 out of 100 people who use it will have an unplanned pregnancy. But when the shot is not used exactly as directed, it is about 94% effective. This means that about 6 out of 100 people who use it will have an unplanned pregnancy.  Be sure to tell your doctor about any health problems you have or medicines you take. The doctor can help you choose the birth control method that is right for you.  What are the advantages of the shot?  The shot is one of the most effective methods of birth control.  It's convenient. You need to get a shot only once every 3 months to prevent pregnancy. You don't have to interrupt sex to protect against pregnancy.  It prevents pregnancy for 3 months at a time. You don't have to worry about birth control for this time.  It's safe to use while breastfeeding.  The shot may reduce heavy bleeding and cramping.  The shot doesn't contain estrogen. So you can use it if you don't want to take estrogen or can't take estrogen because you have certain health problems or concerns.  What are the  "disadvantages of the shot?  The shot doesn't protect against sexually transmitted infections (STIs), such as herpes or HIV. A condom can be used to reduce your risk of getting an STI.  The shot may cause bone loss in some people. Talk to your doctor about the risks and benefits.  The shot is needed every 3 months. Any side effects may last 3 months or longer.  The shot may cause irregular periods, or you may have spotting between periods.  It may cause mood changes, less interest in sex, or weight gain.  If you want to get pregnant, it may take up to a year after you stop getting the shot. This is because the hormones the shot provided have to leave your system, and your body has to readjust.  Where can you learn more?  Go to https://www.[employer].net/patiented  Enter L565 in the search box to learn more about \"Learning About Birth Control: The Shot.\"  Current as of: April 19, 2023               Content Version: 13.8    0408-9825 [employer].   Care instructions adapted under license by your healthcare professional. If you have questions about a medical condition or this instruction, always ask your healthcare professional. [employer], [employer] disclaims any warranty or liability for your use of this information.  "

## 2024-03-06 NOTE — NURSING NOTE
"Chief Complaint   Patient presents with    Abnormal Bleeding Problem     On day 13 of period, feeling faint/light headed, cramping, blood clots day 8-10       Initial /68 (BP Location: Right arm, Patient Position: Sitting, Cuff Size: Adult Regular)   Ht 1.613 m (5' 3.5\")   Wt 53.5 kg (118 lb)   LMP 2024 (Exact Date)   BMI 20.57 kg/m   Estimated body mass index is 20.57 kg/m  as calculated from the following:    Height as of this encounter: 1.613 m (5' 3.5\").    Weight as of this encounter: 53.5 kg (118 lb).  BP completed using cuff size: regular    Questioned patient about current smoking habits.  Pt. has never smoked.          The following HM Due: NONE    Diaz Alvarez CMA              "

## 2024-03-10 ENCOUNTER — MYC MEDICAL ADVICE (OUTPATIENT)
Dept: PEDIATRICS | Facility: CLINIC | Age: 19
End: 2024-03-10
Payer: COMMERCIAL

## 2024-03-11 ENCOUNTER — NURSE TRIAGE (OUTPATIENT)
Dept: PEDIATRICS | Facility: CLINIC | Age: 19
End: 2024-03-11
Payer: COMMERCIAL

## 2024-03-11 ENCOUNTER — MYC MEDICAL ADVICE (OUTPATIENT)
Dept: PEDIATRICS | Facility: CLINIC | Age: 19
End: 2024-03-11
Payer: COMMERCIAL

## 2024-03-11 NOTE — TELEPHONE ENCOUNTER
Pt is calling with chest pain and dizziness.   Sates that she has been seen for syncope symptoms at [place] with Dr. Albarado.   She was recently in the ER twice a few weeks ago.  Since she was seen by Lona Cuello PA-C.  She follows with cardiology as well for ST, postural dizziness, syncope.  She is getting sharp chest pain right under breast. It is a constant super sharp. She is feeling constantly lightheaded and feels like the room is spinning and having headaches.   Newer symptom: left hand has been tremoring and spasms in left shoulder.  Pt currently rates pain as 8/10.  It has been constant since last night  She has a heart monitor on right now, placed by her cardiologist.  Dizziness-yes  Nausea-no  She is having pain when trying to take a breath. Can't take a deep breath in without sharp pain.  Pt denies any hx of chronic lung conditions.   Does have some cardiac hx noted.    Triage disposition: Go to ED Now.  Pt was notified of triage disposition.  Note that pt has more complex hx and is already following cardiology and IM.  Notified patient that if she was agreeable to ED, this is advised. Also discussed that since she is being followed and has already been seen for similar symptoms, it could be appropriate to get PCP's input as well and writer could send a message to her PCP. Pt states that she will head into the ED. She plans on going to ED in [place].    Reason for Disposition   SEVERE chest pain    Additional Information   Negative: Followed an injury to chest    Protocols used: Chest Pain-A-[place]  Fartun Lam RN

## 2024-03-11 NOTE — TELEPHONE ENCOUNTER
Again, advised ED with worsening symptoms.  Routed to PCP previously for further recommendations  Patient needs ED follow up scheduled, message to patient to check on her availability.  See another message from the patient asking if a VV would be sufficient-if in person which would be better, can patient see any provider?     Opal Martinez RN

## 2024-03-11 NOTE — TELEPHONE ENCOUNTER
Sent a [employer] message to the patient   - Informed the patient of Dr. Albarado's comments/recommendations   - Informed the patient that if the pain is still worse with deep breaths, reassuring that it's not cardiac  - Informed the patient that she has been on an oral contraceptive pill, so it is reasonable to get records from the Emergency Room to see if they did a d-dimer   - Informed the patient that if they did not complete a d-dimer then recommended that the patient go back to the ER, especially if it is worsening   - Scheduled the patient for an appointment with Dr. Albarado tomorrow (3/12/2024) at 2:40 PM (arrival time of 2:20 PM)   - Asked the patient to confirm this appointment date and time   - Awaiting a response from the patient at this time     Teena GARCIA RN   [facility]

## 2024-03-11 NOTE — TELEPHONE ENCOUNTER
If pain is still worse with deep breath, reassuring that it's not cardiac. She has been on OCP, so reasonable to get records from ER to see if they did a d-dimer. If not, or if we don't have records, reasonable to go back to ER, especially if worsening.  Sounds like this pain is really new in the last few days. Is there anyone who can see her in clinic tomorrow? Or could do virtual but slightly less ideal.   Maria Elena Albarado M.D.

## 2024-03-11 NOTE — TELEPHONE ENCOUNTER
Patient was triaged this morning and was reporting constant chest pain at 8/10 intensity.  She was advised to go to the ED and she was evaluated in the [place] ED.  This afternoon, patient sent a message describing the same symptoms she reported to the triage nurse this morning.  In the ED, CXR was done and normal.      Message to patient: back to the ED with worsening pain  Asking patient what was recommended by the ED as we do not see those notes.  She has a cardiologist as well    Waiting to hear from the patient regarding what the ED recommendations were-advised ED again if needed for chest pain.    Opal Martinez RN

## 2024-03-12 ENCOUNTER — OFFICE VISIT (OUTPATIENT)
Dept: PEDIATRICS | Facility: CLINIC | Age: 19
End: 2024-03-12
Payer: COMMERCIAL

## 2024-03-12 VITALS
HEART RATE: 69 BPM | WEIGHT: 117.6 LBS | RESPIRATION RATE: 20 BRPM | TEMPERATURE: 98.1 F | HEIGHT: 63 IN | BODY MASS INDEX: 20.84 KG/M2 | OXYGEN SATURATION: 100 % | DIASTOLIC BLOOD PRESSURE: 67 MMHG | SYSTOLIC BLOOD PRESSURE: 108 MMHG

## 2024-03-12 DIAGNOSIS — I47.29 NSVT (NONSUSTAINED VENTRICULAR TACHYCARDIA) (H): ICD-10-CM

## 2024-03-12 DIAGNOSIS — R42 POSTURAL DIZZINESS: ICD-10-CM

## 2024-03-12 DIAGNOSIS — R07.89 ATYPICAL CHEST PAIN: Primary | ICD-10-CM

## 2024-03-12 LAB
BASOPHILS # BLD AUTO: 0 10E3/UL (ref 0–0.2)
BASOPHILS NFR BLD AUTO: 0 %
D DIMER PPP FEU-MCNC: 0.28 UG/ML FEU (ref 0–0.5)
EOSINOPHIL # BLD AUTO: 0.1 10E3/UL (ref 0–0.7)
EOSINOPHIL NFR BLD AUTO: 1 %
ERYTHROCYTE [DISTWIDTH] IN BLOOD BY AUTOMATED COUNT: 12.3 % (ref 10–15)
ERYTHROCYTE [SEDIMENTATION RATE] IN BLOOD BY WESTERGREN METHOD: 16 MM/HR (ref 0–20)
HCT VFR BLD AUTO: 40.1 % (ref 35–47)
HGB BLD-MCNC: 13.3 G/DL (ref 11.7–15.7)
IMM GRANULOCYTES # BLD: 0 10E3/UL
IMM GRANULOCYTES NFR BLD: 0 %
LYMPHOCYTES # BLD AUTO: 2.5 10E3/UL (ref 0.8–5.3)
LYMPHOCYTES NFR BLD AUTO: 38 %
MCH RBC QN AUTO: 31.1 PG (ref 26.5–33)
MCHC RBC AUTO-ENTMCNC: 33.2 G/DL (ref 31.5–36.5)
MCV RBC AUTO: 94 FL (ref 78–100)
MONOCYTES # BLD AUTO: 0.5 10E3/UL (ref 0–1.3)
MONOCYTES NFR BLD AUTO: 7 %
NEUTROPHILS # BLD AUTO: 3.7 10E3/UL (ref 1.6–8.3)
NEUTROPHILS NFR BLD AUTO: 54 %
PLATELET # BLD AUTO: 259 10E3/UL (ref 150–450)
RBC # BLD AUTO: 4.27 10E6/UL (ref 3.8–5.2)
WBC # BLD AUTO: 6.8 10E3/UL (ref 4–11)

## 2024-03-12 PROCEDURE — 36415 COLL VENOUS BLD VENIPUNCTURE: CPT | Performed by: INTERNAL MEDICINE

## 2024-03-12 PROCEDURE — 83690 ASSAY OF LIPASE: CPT | Performed by: INTERNAL MEDICINE

## 2024-03-12 PROCEDURE — 86140 C-REACTIVE PROTEIN: CPT | Performed by: INTERNAL MEDICINE

## 2024-03-12 PROCEDURE — 85652 RBC SED RATE AUTOMATED: CPT | Performed by: INTERNAL MEDICINE

## 2024-03-12 PROCEDURE — 85025 COMPLETE CBC W/AUTO DIFF WBC: CPT | Performed by: INTERNAL MEDICINE

## 2024-03-12 PROCEDURE — 93000 ELECTROCARDIOGRAM COMPLETE: CPT | Performed by: INTERNAL MEDICINE

## 2024-03-12 PROCEDURE — 85379 FIBRIN DEGRADATION QUANT: CPT | Performed by: INTERNAL MEDICINE

## 2024-03-12 PROCEDURE — 99214 OFFICE O/P EST MOD 30 MIN: CPT | Performed by: INTERNAL MEDICINE

## 2024-03-12 ASSESSMENT — PAIN SCALES - GENERAL: PAINLEVEL: EXTREME PAIN (8)

## 2024-03-12 NOTE — PROGRESS NOTES
Assessment & Plan     Atypical chest pain  Pleuritic chest pain, severe yesterday, some improvement today. No change in other symptoms, shortness of breath, exercise intolerance. Plan D-dimer as she was on combined OCP until recently. EKG unremarkable today. Will check labs for inflammation. Consider echo, though symptoms not clearly typical for pericarditis. Will reach out to her cardiologist and have recommended she do the same.   - CBC with platelets and differential; Future  - D dimer, quantitative; Future  - EKG 12-lead complete w/read - Clinics  - Lipase; Future  - ESR: Erythrocyte sedimentation rate; Future  - CRP, inflammation; Future  - CBC with platelets and differential  - D dimer, quantitative  - Lipase  - ESR: Erythrocyte sedimentation rate  - CRP, inflammation    Postural dizziness  She remains very symptomatic. Workup in POTS clinic not for other 2 months. She is quite limited by her symptoms. She and her mother tell me that they did discussed possible medications with Dr. Monet, but were planning on waiting for evaluation. I suggested they reach out to her about this and whether she would consider a prescription for symptomatic management prior to tilt table or other testing.     NSVT  Currently has heart monitor in place.    Review of prior external note(s) from - [facility] information from [employer] reviewed    30 minutes spent by me on the date of the encounter doing chart review, history and exam, documentation and further activities per the note      See Patient Instructions    Katty Fermin is a 18 year old, presenting for the following health issues:  Chest Pain (/)        3/12/2024     2:34 PM   Additional Questions   Roomed by AL Christian   Accompanied by Mother Brando Curry   Failed to redirect to the Timeline version of the REVFS SmartLink.      3/12/2024     2:34 PM   Patient Reported Additional Medications   Patient reports taking the following new medications n/a  "    History of Present Illness       Reason for visit:  Post ED follow up    She eats 2-3 servings of fruits and vegetables daily.She consumes 1 sweetened beverage(s) daily.She exercises with enough effort to increase her heart rate 30 to 60 minutes per day.  She exercises with enough effort to increase her heart rate 4 days per week.   She is taking medications regularly.     1 week ago developed chest pain, was a twinge with deep breath and would go away.     Super sharp pain lower anterior ribs on L right under breast. Wincing in pain when it's bad. Was carrying her bags up stairs and pain got sharper. 2 nights ago and through yesterday was constant. Now feels like she needs to take shallow breaths due to feeling like something is catching.     Sometimes feels short of breath, especially when heart rate is faster.     Same thing a few months ago, gradually went away on it's own.       Review of Systems  Constitutional, HEENT, cardiovascular, pulmonary, gi and gu systems are negative, except as otherwise noted.      Objective    /67 (BP Location: Right arm, Patient Position: Sitting, Cuff Size: Adult Regular)   Pulse 69   Temp 98.1  F (36.7  C) (Oral)   Resp 20   Ht 1.599 m (5' 2.95\")   Wt 53.3 kg (117 lb 9.6 oz)   LMP 02/23/2024 (Exact Date)   SpO2 100%   Breastfeeding No   BMI 20.86 kg/m    Body mass index is 20.86 kg/m .  Physical Exam   GENERAL: alert and no distress  NECK: no adenopathy, no asymmetry, masses, or scars  RESP: lungs clear to auscultation - no rales, rhonchi or wheezes  CV: regular rate and rhythm, normal S1 S2, no S3 or S4, no murmur, click or rub, no peripheral edema  ABDOMEN: soft, nontender, no hepatosplenomegaly, no masses and bowel sounds normal  MS: no gross musculoskeletal defects noted, no edema. Point tenderness L lower ribs just inferior to breast tissue. Palpation does not reproduce her pain.     Admission on 02/27/2024, Discharged on 02/27/2024   Component Date Value " Ref Range Status    Ventricular Rate 02/27/2024 75  BPM Final    Atrial Rate 02/27/2024 75  BPM Final    MA Interval 02/27/2024 142  ms Final    QRS Duration 02/27/2024 78  ms Final    QT 02/27/2024 374  ms Final    QTc 02/27/2024 417  ms Final    P Axis 02/27/2024 66  degrees Final    R AXIS 02/27/2024 74  degrees Final    T Axis 02/27/2024 32  degrees Final    Interpretation ECG 02/27/2024    Final                    Value:Sinus rhythm  Normal ECG  When compared with ECG of 29-NOV-2023 10:27,  No significant change was found  Confirmed by GENERATED REPORT, COMPUTER ([id number]),  Migdalia Souza ([id number]) on 2/27/2024 8:41:58 PM      Sodium 02/27/2024 137  135 - 145 mmol/L Final    Reference intervals for this test were updated on 09/26/2023 to more accurately reflect our healthy population. There may be differences in the flagging of prior results with similar values performed with this method. Interpretation of those prior results can be made in the context of the updated reference intervals.     Potassium 02/27/2024 3.6  3.4 - 5.3 mmol/L Final    Chloride 02/27/2024 103  98 - 107 mmol/L Final    Carbon Dioxide (CO2) 02/27/2024 23  22 - 29 mmol/L Final    Anion Gap 02/27/2024 11  7 - 15 mmol/L Final    Urea Nitrogen 02/27/2024 10.2  6.0 - 20.0 mg/dL Final    Creatinine 02/27/2024 0.75  0.51 - 0.95 mg/dL Final    GFR Estimate 02/27/2024 >90  >60 mL/min/1.73m2 Final    Calcium 02/27/2024 9.1  8.6 - 10.0 mg/dL Final    Glucose 02/27/2024 98  70 - 99 mg/dL Final    WBC Count 02/27/2024 7.7  4.0 - 11.0 10e3/uL Final    RBC Count 02/27/2024 4.22  3.80 - 5.20 10e6/uL Final    Hemoglobin 02/27/2024 13.3  11.7 - 15.7 g/dL Final    Hematocrit 02/27/2024 39.3  35.0 - 47.0 % Final    MCV 02/27/2024 93  78 - 100 fL Final    MCH 02/27/2024 31.5  26.5 - 33.0 pg Final    MCHC 02/27/2024 33.8  31.5 - 36.5 g/dL Final    RDW 02/27/2024 12.2  10.0 - 15.0 % Final    Platelet Count 02/27/2024 258  150 - 450 10e3/uL Final    %  Neutrophils 02/27/2024 56  % Final    % Lymphocytes 02/27/2024 34  % Final    % Monocytes 02/27/2024 8  % Final    % Eosinophils 02/27/2024 1  % Final    % Basophils 02/27/2024 1  % Final    % Immature Granulocytes 02/27/2024 0  % Final    NRBCs per 100 WBC 02/27/2024 0  <1 /100 Final    Absolute Neutrophils 02/27/2024 4.4  1.6 - 8.3 10e3/uL Final    Absolute Lymphocytes 02/27/2024 2.6  0.8 - 5.3 10e3/uL Final    Absolute Monocytes 02/27/2024 0.6  0.0 - 1.3 10e3/uL Final    Absolute Eosinophils 02/27/2024 0.0  0.0 - 0.7 10e3/uL Final    Absolute Basophils 02/27/2024 0.0  0.0 - 0.2 10e3/uL Final    Absolute Immature Granulocytes 02/27/2024 0.0  <=0.4 10e3/uL Final    Absolute NRBCs 02/27/2024 0.0  10e3/uL Final     Results for orders placed or performed in visit on 03/12/24   D dimer, quantitative     Status: Normal   Result Value Ref Range    D-Dimer Quantitative 0.28 0.00 - 0.50 ug/mL FEU    Narrative    This D-dimer assay is intended for use in conjunction with a clinical pretest probability assessment model to exclude pulmonary embolism (PE) and deep venous thrombosis (DVT) in outpatients suspected of PE or DVT. The cut-off value is 0.50 ug/mL FEU.   Lipase     Status: Normal   Result Value Ref Range    Lipase 27 13 - 60 U/L   ESR: Erythrocyte sedimentation rate     Status: Normal   Result Value Ref Range    Erythrocyte Sedimentation Rate 16 0 - 20 mm/hr   CRP, inflammation     Status: Normal   Result Value Ref Range    CRP Inflammation <3.00 <5.00 mg/L   CBC with platelets and differential     Status: None   Result Value Ref Range    WBC Count 6.8 4.0 - 11.0 10e3/uL    RBC Count 4.27 3.80 - 5.20 10e6/uL    Hemoglobin 13.3 11.7 - 15.7 g/dL    Hematocrit 40.1 35.0 - 47.0 %    MCV 94 78 - 100 fL    MCH 31.1 26.5 - 33.0 pg    MCHC 33.2 31.5 - 36.5 g/dL    RDW 12.3 10.0 - 15.0 %    Platelet Count 259 150 - 450 10e3/uL    % Neutrophils 54 %    % Lymphocytes 38 %    % Monocytes 7 %    % Eosinophils 1 %    % Basophils 0  %    % Immature Granulocytes 0 %    Absolute Neutrophils 3.7 1.6 - 8.3 10e3/uL    Absolute Lymphocytes 2.5 0.8 - 5.3 10e3/uL    Absolute Monocytes 0.5 0.0 - 1.3 10e3/uL    Absolute Eosinophils 0.1 0.0 - 0.7 10e3/uL    Absolute Basophils 0.0 0.0 - 0.2 10e3/uL    Absolute Immature Granulocytes 0.0 <=0.4 10e3/uL   CBC with platelets and differential     Status: None    Narrative    The following orders were created for panel order CBC with platelets and differential.  Procedure                               Abnormality         Status                     ---------                               -----------         ------                     CBC with platelets and d...[[id number]]                      Final result                 Please view results for these tests on the individual orders.           Signed Electronically by: Maria Elena Albarado MD

## 2024-03-13 ENCOUNTER — OFFICE VISIT (OUTPATIENT)
Dept: URGENT CARE | Facility: URGENT CARE | Age: 19
End: 2024-03-13
Payer: COMMERCIAL

## 2024-03-13 ENCOUNTER — MYC MEDICAL ADVICE (OUTPATIENT)
Dept: PEDIATRICS | Facility: CLINIC | Age: 19
End: 2024-03-13

## 2024-03-13 VITALS
WEIGHT: 117 LBS | TEMPERATURE: 98.7 F | BODY MASS INDEX: 20.76 KG/M2 | SYSTOLIC BLOOD PRESSURE: 122 MMHG | OXYGEN SATURATION: 99 % | DIASTOLIC BLOOD PRESSURE: 82 MMHG | RESPIRATION RATE: 16 BRPM | HEART RATE: 75 BPM

## 2024-03-13 DIAGNOSIS — M54.9 ACUTE BILATERAL BACK PAIN, UNSPECIFIED BACK LOCATION: Primary | ICD-10-CM

## 2024-03-13 DIAGNOSIS — R07.9 CHEST PAIN, UNSPECIFIED TYPE: ICD-10-CM

## 2024-03-13 DIAGNOSIS — Q21.12 PFO (PATENT FORAMEN OVALE): ICD-10-CM

## 2024-03-13 DIAGNOSIS — I47.29 NSVT (NONSUSTAINED VENTRICULAR TACHYCARDIA) (H): ICD-10-CM

## 2024-03-13 DIAGNOSIS — R55 NEAR SYNCOPE: Primary | ICD-10-CM

## 2024-03-13 LAB
CRP SERPL-MCNC: <3 MG/L
LIPASE SERPL-CCNC: 27 U/L (ref 13–60)

## 2024-03-13 PROCEDURE — 99214 OFFICE O/P EST MOD 30 MIN: CPT | Mod: 25 | Performed by: NURSE PRACTITIONER

## 2024-03-13 PROCEDURE — 93000 ELECTROCARDIOGRAM COMPLETE: CPT | Performed by: NURSE PRACTITIONER

## 2024-03-13 NOTE — PROGRESS NOTES
Assessment & Plan      Diagnosis Comments   1. Near syncope        2. Chest pain, unspecified type  EKG 12-lead complete w/read - Clinics nsr      3. PFO (patent foramen ovale)        4. NSVT (nonsustained ventricular tachycardia) (H)          Patient neurologically stable in clinic today no indication for cause of near syncopal event recommend patient continue plan of care she has been also discussed possibly keeping a journal of symptoms to share these with her encounter POTS specialty.  She will follow-up with her primary care as needed.  She has that I share this encounter note with her primary care provider.  We discussed red flag symptoms that would warrant emergent or urgent evaluation.    [facility] ALESSANDRA Fermin is a 18 year old female who presents to clinic today for the following health issues:  Chief Complaint   Patient presents with    Urgent Care     - 1 Week  - Chest Pain   - Vertigo  - Syncope  - Lightheaded  - Has been to ED (Monday), Primary (Yesterday) and has not gotten any treatment from any of the facilties and is here today for further evaluation     HPI    Patient presents to clinic she states that this morning in class around 5 hours ago she had a episode of near syncope stating she felt lightheaded and dizzy as if she was going to pass out noting that symptoms lasted a few minutes and then cleared.  Currently she states she feels normal.  She has been having symptoms of left upper quadrant rib pain feeling dull ache underneath the rib area on the left side.  Patient was seen through emergency department 2 days ago and then yesterday she was seen by her primary care provider.  These encounters were reviewed EKGs have been within NSR, labs were reviewed including those that were completed yesterday these were normal she has additional labs that are still processing.  In clinic patient is alert cooperative very pleasant.  She is wearing an  event monitor states she forgot to press the button during the episode this morning.  She denies any recent fevers, cough, sinus congestion.  States that she ate breakfast this morning yogurt with granola, water and a vitamin drink.  Denies risk of pregnancy.    Patient has a history of PFO, sinus tach, sinus bradycardia, syncopal events.  She has upcoming appointments related to workup for POTS syndrome.  Recent cardiology note reviewed please see epic for this.      Review of Systems  Constitutional, HEENT, cardiovascular, pulmonary, gi and gu systems are negative, except as otherwise noted.      Objective    /82   Pulse 75   Temp 98.7  F (37.1  C) (Tympanic)   Wt 53.1 kg (117 lb)   LMP 02/23/2024 (Exact Date)   SpO2 99%   BMI 20.76 kg/m    Physical Exam   GENERAL: alert and no distress  EYES: Eyes grossly normal to inspection, PERRL and conjunctivae and sclerae normal  HENT: ear canals and TM's normal, nose and mouth without ulcers or lesions  NECK: no adenopathy, no asymmetry, masses, or scars  RESP: lungs clear to auscultation - no rales, rhonchi or wheezes  CV: regular rate and rhythm, normal S1 S2, no S3 or S4, no murmur, click or rub, no peripheral edema  ABDOMEN: soft, nontender, no hepatosplenomegaly, no masses and bowel sounds normal  MS: no gross musculoskeletal defects noted, no edema  SKIN: no suspicious lesions or rashes  NEURO: Normal strength and tone, sensory exam grossly normal, mentation intact, speech normal, cranial nerves 2-12 intact, and rapid alternating movements normal  BACK: no CVA tenderness, no paralumbar tenderness  PSYCH: mentation appears normal, affect normal/bright  LYMPH: no cervical, supraclavicular, axillary, or inguinal adenopathy      Symptoms at time of EKG: None   Rhythm: Normal sinus   Rate: Normal  Axis: Normal  Ectopy: None  Conduction: Normal  ST Segments/ T Waves: No ST-T wave changes and No acute ischemic changes  Q Waves: None  Comparison to prior:  Unchanged    Clinical Impression: normal EKG

## 2024-03-14 NOTE — TELEPHONE ENCOUNTER
"Please review-patient was seen in the [facility] Urgent care on 3/13/24: near syncope, lightheaded and dizzy, upper quadrant rib pain on the left side.    Event monitor in process.      Patient has reached out to her cardiologist and was asked to wait until the appointment with Dr. Jiménez on 4/4, no ECHO needed.    \"Patient neurologically stable in clinic today no indication for cause of near syncopal event recommend patient continue plan of care she has been also discussed possibly keeping a journal of symptoms to share these with her encounter POTS specialty.  She will follow-up with her primary care as needed.  She has that I share this encounter note with her primary care provider.  We discussed red flag symptoms that would warrant emergent or urgent evaluation.\"     Opal Martinez RN    "

## 2024-03-19 NOTE — TELEPHONE ENCOUNTER
Patient has received a message from Dr. Albarado  suggesting doing an US of the abdomen to look at the blood flow to the kidneys.  Patient replied and in agreement.    Dr. Albarado- per chart review, patient in the ED in [facility] today.    I pended the order-please review and sign if appropriate.    Opal Martinez RN

## 2024-03-23 PROCEDURE — 93005 ELECTROCARDIOGRAM TRACING: CPT

## 2024-03-23 PROCEDURE — 99285 EMERGENCY DEPT VISIT HI MDM: CPT | Mod: 25

## 2024-03-23 ASSESSMENT — COLUMBIA-SUICIDE SEVERITY RATING SCALE - C-SSRS
6. HAVE YOU EVER DONE ANYTHING, STARTED TO DO ANYTHING, OR PREPARED TO DO ANYTHING TO END YOUR LIFE?: NO
2. HAVE YOU ACTUALLY HAD ANY THOUGHTS OF KILLING YOURSELF IN THE PAST MONTH?: NO
1. IN THE PAST MONTH, HAVE YOU WISHED YOU WERE DEAD OR WISHED YOU COULD GO TO SLEEP AND NOT WAKE UP?: NO

## 2024-03-24 ENCOUNTER — HOSPITAL ENCOUNTER (EMERGENCY)
Facility: CLINIC | Age: 19
Discharge: HOME | End: 2024-03-24
Attending: EMERGENCY MEDICINE | Admitting: EMERGENCY MEDICINE
Payer: COMMERCIAL

## 2024-03-24 ENCOUNTER — APPOINTMENT (OUTPATIENT)
Dept: GENERAL RADIOLOGY | Facility: CLINIC | Age: 19
End: 2024-03-24
Attending: EMERGENCY MEDICINE
Payer: COMMERCIAL

## 2024-03-24 VITALS
BODY MASS INDEX: 20.74 KG/M2 | DIASTOLIC BLOOD PRESSURE: 76 MMHG | TEMPERATURE: 98.2 F | SYSTOLIC BLOOD PRESSURE: 124 MMHG | OXYGEN SATURATION: 99 % | WEIGHT: 117.06 LBS | HEART RATE: 64 BPM | RESPIRATION RATE: 18 BRPM | HEIGHT: 63 IN

## 2024-03-24 DIAGNOSIS — R07.89 ATYPICAL CHEST PAIN: ICD-10-CM

## 2024-03-24 DIAGNOSIS — R51.9 NONINTRACTABLE HEADACHE, UNSPECIFIED CHRONICITY PATTERN, UNSPECIFIED HEADACHE TYPE: ICD-10-CM

## 2024-03-24 LAB
ALBUMIN SERPL BCG-MCNC: 4.4 G/DL (ref 3.5–5.2)
ALP SERPL-CCNC: 44 U/L (ref 40–150)
ALT SERPL W P-5'-P-CCNC: 15 U/L (ref 0–50)
ANION GAP SERPL CALCULATED.3IONS-SCNC: 11 MMOL/L (ref 7–15)
AST SERPL W P-5'-P-CCNC: 24 U/L (ref 0–35)
BASOPHILS # BLD AUTO: 0 10E3/UL (ref 0–0.2)
BASOPHILS NFR BLD AUTO: 1 %
BILIRUB DIRECT SERPL-MCNC: <0.2 MG/DL (ref 0–0.3)
BILIRUB SERPL-MCNC: 0.2 MG/DL
BUN SERPL-MCNC: 10.8 MG/DL (ref 6–20)
CALCIUM SERPL-MCNC: 9.3 MG/DL (ref 8.6–10)
CHLORIDE SERPL-SCNC: 104 MMOL/L (ref 98–107)
CREAT SERPL-MCNC: 0.79 MG/DL (ref 0.51–0.95)
D DIMER PPP FEU-MCNC: <0.27 UG/ML FEU (ref 0–0.5)
DEPRECATED HCO3 PLAS-SCNC: 24 MMOL/L (ref 22–29)
EGFRCR SERPLBLD CKD-EPI 2021: >90 ML/MIN/1.73M2
EOSINOPHIL # BLD AUTO: 0.1 10E3/UL (ref 0–0.7)
EOSINOPHIL NFR BLD AUTO: 1 %
ERYTHROCYTE [DISTWIDTH] IN BLOOD BY AUTOMATED COUNT: 12 % (ref 10–15)
FLUAV RNA SPEC QL NAA+PROBE: NEGATIVE
FLUBV RNA RESP QL NAA+PROBE: NEGATIVE
GLUCOSE SERPL-MCNC: 101 MG/DL (ref 70–99)
HCG SERPL QL: NEGATIVE
HCT VFR BLD AUTO: 39.9 % (ref 35–47)
HGB BLD-MCNC: 13.5 G/DL (ref 11.7–15.7)
IMM GRANULOCYTES # BLD: 0 10E3/UL
IMM GRANULOCYTES NFR BLD: 0 %
LIPASE SERPL-CCNC: 29 U/L (ref 13–60)
LYMPHOCYTES # BLD AUTO: 3.9 10E3/UL (ref 0.8–5.3)
LYMPHOCYTES NFR BLD AUTO: 51 %
MCH RBC QN AUTO: 31.5 PG (ref 26.5–33)
MCHC RBC AUTO-ENTMCNC: 33.8 G/DL (ref 31.5–36.5)
MCV RBC AUTO: 93 FL (ref 78–100)
MONOCYTES # BLD AUTO: 0.6 10E3/UL (ref 0–1.3)
MONOCYTES NFR BLD AUTO: 9 %
NEUTROPHILS # BLD AUTO: 2.8 10E3/UL (ref 1.6–8.3)
NEUTROPHILS NFR BLD AUTO: 38 %
NRBC # BLD AUTO: 0 10E3/UL
NRBC BLD AUTO-RTO: 0 /100
PLATELET # BLD AUTO: 223 10E3/UL (ref 150–450)
POTASSIUM SERPL-SCNC: 3.9 MMOL/L (ref 3.4–5.3)
PROT SERPL-MCNC: 7.7 G/DL (ref 6.4–8.3)
RBC # BLD AUTO: 4.28 10E6/UL (ref 3.8–5.2)
RSV RNA SPEC NAA+PROBE: NEGATIVE
SARS-COV-2 RNA RESP QL NAA+PROBE: NEGATIVE
SODIUM SERPL-SCNC: 139 MMOL/L (ref 135–145)
TROPONIN T SERPL HS-MCNC: <6 NG/L
WBC # BLD AUTO: 7.5 10E3/UL (ref 4–11)

## 2024-03-24 PROCEDURE — 71046 X-RAY EXAM CHEST 2 VIEWS: CPT

## 2024-03-24 PROCEDURE — 84484 ASSAY OF TROPONIN QUANT: CPT | Performed by: EMERGENCY MEDICINE

## 2024-03-24 PROCEDURE — 85379 FIBRIN DEGRADATION QUANT: CPT | Performed by: EMERGENCY MEDICINE

## 2024-03-24 PROCEDURE — 87637 SARSCOV2&INF A&B&RSV AMP PRB: CPT | Performed by: EMERGENCY MEDICINE

## 2024-03-24 PROCEDURE — 250N000011 HC RX IP 250 OP 636: Performed by: EMERGENCY MEDICINE

## 2024-03-24 PROCEDURE — 83690 ASSAY OF LIPASE: CPT | Performed by: EMERGENCY MEDICINE

## 2024-03-24 PROCEDURE — 258N000003 HC RX IP 258 OP 636: Performed by: EMERGENCY MEDICINE

## 2024-03-24 PROCEDURE — 36415 COLL VENOUS BLD VENIPUNCTURE: CPT | Performed by: EMERGENCY MEDICINE

## 2024-03-24 PROCEDURE — 80048 BASIC METABOLIC PNL TOTAL CA: CPT | Performed by: EMERGENCY MEDICINE

## 2024-03-24 PROCEDURE — 96361 HYDRATE IV INFUSION ADD-ON: CPT

## 2024-03-24 PROCEDURE — 82248 BILIRUBIN DIRECT: CPT | Performed by: EMERGENCY MEDICINE

## 2024-03-24 PROCEDURE — 96374 THER/PROPH/DIAG INJ IV PUSH: CPT

## 2024-03-24 PROCEDURE — 250N000013 HC RX MED GY IP 250 OP 250 PS 637: Performed by: EMERGENCY MEDICINE

## 2024-03-24 PROCEDURE — 84703 CHORIONIC GONADOTROPIN ASSAY: CPT | Performed by: EMERGENCY MEDICINE

## 2024-03-24 PROCEDURE — 85004 AUTOMATED DIFF WBC COUNT: CPT | Performed by: EMERGENCY MEDICINE

## 2024-03-24 RX ORDER — ACETAMINOPHEN 500 MG
1000 TABLET ORAL ONCE
Status: DISCONTINUED | OUTPATIENT
Start: 2024-03-24 | End: 2024-03-24

## 2024-03-24 RX ORDER — ACETAMINOPHEN 500 MG
500 TABLET ORAL ONCE
Status: DISCONTINUED | OUTPATIENT
Start: 2024-03-24 | End: 2024-03-24

## 2024-03-24 RX ORDER — OXYCODONE HYDROCHLORIDE 5 MG/1
5 TABLET ORAL ONCE
Status: COMPLETED | OUTPATIENT
Start: 2024-03-24 | End: 2024-03-24

## 2024-03-24 RX ORDER — ONDANSETRON 2 MG/ML
4 INJECTION INTRAMUSCULAR; INTRAVENOUS ONCE
Status: COMPLETED | OUTPATIENT
Start: 2024-03-24 | End: 2024-03-24

## 2024-03-24 RX ADMIN — SODIUM CHLORIDE 1000 ML: 9 INJECTION, SOLUTION INTRAVENOUS at 01:32

## 2024-03-24 RX ADMIN — OXYCODONE HYDROCHLORIDE 5 MG: 5 TABLET ORAL at 02:58

## 2024-03-24 RX ADMIN — ONDANSETRON 4 MG: 2 INJECTION INTRAMUSCULAR; INTRAVENOUS at 03:00

## 2024-03-24 ASSESSMENT — ACTIVITIES OF DAILY LIVING (ADL)
ADLS_ACUITY_SCORE: 35
ADLS_ACUITY_SCORE: 33

## 2024-03-24 NOTE — ED PROVIDER NOTES
"  History     Chief Complaint:  Chest Pain and Headache    The history is provided by the patient and a parent.      Aniyah Curry is a 18 year old female who presents with her mother for left-sided chest pain. Patient was seen in the ED this past week for a pain below her left ribs that has been ongoing. Tonight, a few hours prior to arrival, while driving, she started experiencing a sharp \"pinching\" sensation in her left-anterior chest that does not radiate. Nothing makes the pain better or worse and it can not be reproduced with palpation. She also endorses a frontal headache, left-arm tingling, and says that she experienced tunnel vision while she was driving home. When she got home, her mother found her curled into a ball due to pain. She took ibuprofen without relief. Her sister reported to her mother that she had a near syncopal episode. Patient denies current headache, vision changes, focal weakness, paresthesias, abdominal pain, sore throat, rhinorrhea, cough, congestion, or fever. No vomiting, diarrhea, urinary complaints or black or bloody stool. Patient has been having on-going health concerns and currently has a Holter-monitor in place. She is scheduled for an ultrasound to examine the left-sided rib pain in three days. Patient also notes recently switching her birth control to Depo-Provera and that she had a negative D-dimer and Troponin blood test two weeks ago. Mother denies any family history of sudden cardiac death or blood clots.     Independent Historian:    Mother at bedside reports the history as stated above.     Review of External Notes:    3/19/24 chest wall pain ED visit    Medications:    Depo-Provera    Past Medical History:    Other congenital deformity of hip (joint)    Past Surgical History:    Appendectomy   Left labrial tear repair      Physical Exam   Patient Vitals for the past 24 hrs:   BP Temp Temp src Pulse Resp SpO2 Height Weight   03/23/24 2254 130/87 98.2  F (36.8  C) " "Temporal 79 18 99 % 1.6 m (5' 3\") 53.1 kg (117 lb 1 oz)      Physical Exam  Nursing note and vitals reviewed.  Constitutional: Well nourished. Ziopatch in place  Eyes: Conjunctiva normal.  Pupils are equal, round, and reactive to light.   ENT: Nose normal. Mucous membranes pink and moist.  TM normal. No posterior oropharyngeal erythema/exudate. Uvula midline  Neck: Normal range of motion.  CVS: Normal rate, regular rhythm.  Normal heart sounds.    Pulmonary: Lungs clear to auscultation bilaterally. No wheezes/rales/rhonchi.  GI: Abdomen soft. Nontender, nondistended. No rigidity or guarding. No CVA tenderness   MSK: No calf tenderness or swelling. Moves all extremities equally and symmetrically  Neuro: Alert. Follows simple commands.  Skin: Skin is warm and dry. No rash noted.   Psychiatric: Normal affect.       Emergency Department Course   ECG  ECG results from 03/24/24   EKG 12-lead, tracing only     Value    Systolic Blood Pressure     Diastolic Blood Pressure     Ventricular Rate 68    Atrial Rate 68    UT Interval 98    QRS Duration 74        QTc 406    P Axis -11    R AXIS 82    T Axis 25    Interpretation ECG      Sinus rhythm with sinus arrhythmia with short UT  Otherwise normal ECG  When compared with ECG of 27-FEB-2024 20:30,  No significant change was found        Imaging:  XR Chest 2 Views   Final Result   IMPRESSION: PA and lateral views of the chest were obtained. Left chest wall electronic monitoring device. Cardiac mediastinal silhouette is within normal limits. No suspicious focal pulmonary opacities. No significant pleural effusion or pneumothorax.        Laboratory:  Labs Ordered and Resulted from Time of ED Arrival to Time of ED Departure   BASIC METABOLIC PANEL - Abnormal       Result Value    Sodium 139      Potassium 3.9      Chloride 104      Carbon Dioxide (CO2) 24      Anion Gap 11      Urea Nitrogen 10.8      Creatinine 0.79      GFR Estimate >90      Calcium 9.3      Glucose 101 " (*)    INFLUENZA A/B, RSV, & SARS-COV2 PCR - Normal    Influenza A PCR Negative      Influenza B PCR Negative      RSV PCR Negative      SARS CoV2 PCR Negative     HCG QUALITATIVE PREGNANCY - Normal    hCG Serum Qualitative Negative     D DIMER QUANTITATIVE - Normal    D-Dimer Quantitative <0.27     HEPATIC FUNCTION PANEL - Normal    Protein Total 7.7      Albumin 4.4      Bilirubin Total 0.2      Alkaline Phosphatase 44      AST 24      ALT 15      Bilirubin Direct <0.20     LIPASE - Normal    Lipase 29     TROPONIN T, HIGH SENSITIVITY - Normal    Troponin T, High Sensitivity <6     CBC WITH PLATELETS AND DIFFERENTIAL    WBC Count 7.5      RBC Count 4.28      Hemoglobin 13.5      Hematocrit 39.9      MCV 93      MCH 31.5      MCHC 33.8      RDW 12.0      Platelet Count 223      % Neutrophils 38      % Lymphocytes 51      % Monocytes 9      % Eosinophils 1      % Basophils 1      % Immature Granulocytes 0      NRBCs per 100 WBC 0      Absolute Neutrophils 2.8      Absolute Lymphocytes 3.9      Absolute Monocytes 0.6      Absolute Eosinophils 0.1      Absolute Basophils 0.0      Absolute Immature Granulocytes 0.0      Absolute NRBCs 0.0            Emergency Department Course & Assessments:    Interventions:  Medications   sodium chloride 0.9% BOLUS 1,000 mL (0 mLs Intravenous Stopped 3/24/24 0254)   ondansetron (ZOFRAN) injection 4 mg (4 mg Intravenous $Given 3/24/24 0300)   oxyCODONE (ROXICODONE) tablet 5 mg (5 mg Oral $Given 3/24/24 0258)        Assessments:  0154 I obtained history and examined the patient as noted above.     Independent Interpretation (X-rays, CTs, rhythm strip):  CXR: no pneumonia    Consultations/Discussion of Management or Tests:  None       Social Determinants of Health affecting care:  None      Disposition:  The patient was discharged.    Impression & Plan    Medical Decision Making:  Patient is a 18-year-old female presenting with predominant complaints of chest pain.  She notes a  multitude of other symptoms including headache, near syncope prior to her development of chest pain as well.  She is nontoxic, no significant distress on arrival.  Her workup was initiated from triage given prolonged wait times.  EKG without focal ischemia, Brugada, WPW or prolonged QTc.  High-sensitivity screening troponin negative.  Low clinical suspicion for ACS home pericarditis/myocarditis or other serious cardiopulmonary process.  D-dimer negative, clinically doubt PE.  Chest x-ray without focal pneumonia, fluid overload, widened mediastinum or pneumothorax.  She is not pregnant.  Labs without profound anemia or significant electrolyte derangement.  LFTs/lipase normal.  She has no abdominal tenderness on exam and I doubt intra-abdominal source to explain her pain.  She was given an oxycodone and reported significant symptom improvement.  At this point in time I do not feel further advanced workup is warranted.  I recommended continuation of her Holter monitor as well as close PCP follow-up.  Mother and patient comfortable with plan of care, Tylenol/Motrin as needed.  Return precautions given.    Diagnosis:    ICD-10-CM    1. Atypical chest pain  R07.89       2. Nonintractable headache, unspecified chronicity pattern, unspecified headache type  R51.9            Discharge Medications:  New Prescriptions    No medications on file      Scribe Disclosure:  Demi CHAIREZ, am serving as a scribe at 1:13 AM on 3/24/2024 to document services personally performed by Rita Batres DO based on my observations and the provider's statements to me.    3/24/2024   Rita Batres DO McDonald, Lindsey E, DO  03/24/24 0646

## 2024-03-24 NOTE — ED TRIAGE NOTES
Presents to triage with c/o L sided chest pain that began around 2100. She also endorses headache and near syncope episode tonight. Currently wearing outpatient cardiac monitor for diagnostic purposes d/t episodes of tachycardia and orthostatic hypotension.

## 2024-03-25 ENCOUNTER — ANCILLARY PROCEDURE (OUTPATIENT)
Dept: ULTRASOUND IMAGING | Facility: CLINIC | Age: 19
End: 2024-03-25
Attending: INTERNAL MEDICINE
Payer: COMMERCIAL

## 2024-03-25 DIAGNOSIS — M54.9 ACUTE BILATERAL BACK PAIN, UNSPECIFIED BACK LOCATION: ICD-10-CM

## 2024-03-25 LAB
ATRIAL RATE - MUSE: 68 BPM
DIASTOLIC BLOOD PRESSURE - MUSE: NORMAL MMHG
INTERPRETATION ECG - MUSE: NORMAL
P AXIS - MUSE: -11 DEGREES
PR INTERVAL - MUSE: 98 MS
QRS DURATION - MUSE: 74 MS
QT - MUSE: 382 MS
QTC - MUSE: 406 MS
R AXIS - MUSE: 82 DEGREES
SYSTOLIC BLOOD PRESSURE - MUSE: NORMAL MMHG
T AXIS - MUSE: 25 DEGREES
VENTRICULAR RATE- MUSE: 68 BPM

## 2024-03-25 PROCEDURE — 93975 VASCULAR STUDY: CPT | Performed by: RADIOLOGY

## 2024-03-28 ENCOUNTER — PRE VISIT (OUTPATIENT)
Dept: CARDIOLOGY | Facility: CLINIC | Age: 19
End: 2024-03-28
Payer: COMMERCIAL

## 2024-04-03 ENCOUNTER — HOSPITAL ENCOUNTER (EMERGENCY)
Facility: CLINIC | Age: 19
Discharge: HOME | End: 2024-04-03
Attending: EMERGENCY MEDICINE | Admitting: EMERGENCY MEDICINE
Payer: COMMERCIAL

## 2024-04-03 ENCOUNTER — APPOINTMENT (OUTPATIENT)
Dept: CT IMAGING | Facility: CLINIC | Age: 19
End: 2024-04-03
Attending: EMERGENCY MEDICINE
Payer: COMMERCIAL

## 2024-04-03 VITALS
DIASTOLIC BLOOD PRESSURE: 77 MMHG | TEMPERATURE: 98.9 F | HEART RATE: 73 BPM | WEIGHT: 114.2 LBS | BODY MASS INDEX: 20.23 KG/M2 | RESPIRATION RATE: 16 BRPM | HEIGHT: 63 IN | OXYGEN SATURATION: 100 % | SYSTOLIC BLOOD PRESSURE: 111 MMHG

## 2024-04-03 DIAGNOSIS — R51.9 ACUTE NONINTRACTABLE HEADACHE, UNSPECIFIED HEADACHE TYPE: ICD-10-CM

## 2024-04-03 DIAGNOSIS — R10.9 LEFT FLANK PAIN: ICD-10-CM

## 2024-04-03 LAB
ALBUMIN SERPL BCG-MCNC: 4.5 G/DL (ref 3.5–5.2)
ALBUMIN UR-MCNC: NEGATIVE MG/DL
ALP SERPL-CCNC: 47 U/L (ref 40–150)
ALT SERPL W P-5'-P-CCNC: 16 U/L (ref 0–50)
ANION GAP SERPL CALCULATED.3IONS-SCNC: 14 MMOL/L (ref 7–15)
APPEARANCE UR: CLEAR
AST SERPL W P-5'-P-CCNC: 25 U/L (ref 0–35)
BASOPHILS # BLD AUTO: 0 10E3/UL (ref 0–0.2)
BASOPHILS NFR BLD AUTO: 0 %
BILIRUB SERPL-MCNC: 0.2 MG/DL
BILIRUB UR QL STRIP: NEGATIVE
BUN SERPL-MCNC: 10 MG/DL (ref 6–20)
CALCIUM SERPL-MCNC: 9.1 MG/DL (ref 8.6–10)
CHLORIDE SERPL-SCNC: 105 MMOL/L (ref 98–107)
COLOR UR AUTO: YELLOW
CREAT SERPL-MCNC: 0.66 MG/DL (ref 0.51–0.95)
DEPRECATED HCO3 PLAS-SCNC: 21 MMOL/L (ref 22–29)
EGFRCR SERPLBLD CKD-EPI 2021: >90 ML/MIN/1.73M2
EOSINOPHIL # BLD AUTO: 0 10E3/UL (ref 0–0.7)
EOSINOPHIL NFR BLD AUTO: 1 %
ERYTHROCYTE [DISTWIDTH] IN BLOOD BY AUTOMATED COUNT: 11.8 % (ref 10–15)
GLUCOSE SERPL-MCNC: 107 MG/DL (ref 70–99)
GLUCOSE UR STRIP-MCNC: NEGATIVE MG/DL
HCG SERPL QL: NEGATIVE
HCT VFR BLD AUTO: 42.2 % (ref 35–47)
HGB BLD-MCNC: 14.3 G/DL (ref 11.7–15.7)
HGB UR QL STRIP: NEGATIVE
HOLD SPECIMEN: NORMAL
IMM GRANULOCYTES # BLD: 0 10E3/UL
IMM GRANULOCYTES NFR BLD: 0 %
KETONES UR STRIP-MCNC: NEGATIVE MG/DL
LEUKOCYTE ESTERASE UR QL STRIP: NEGATIVE
LYMPHOCYTES # BLD AUTO: 2.5 10E3/UL (ref 0.8–5.3)
LYMPHOCYTES NFR BLD AUTO: 38 %
MCH RBC QN AUTO: 31.2 PG (ref 26.5–33)
MCHC RBC AUTO-ENTMCNC: 33.9 G/DL (ref 31.5–36.5)
MCV RBC AUTO: 92 FL (ref 78–100)
MONOCYTES # BLD AUTO: 0.4 10E3/UL (ref 0–1.3)
MONOCYTES NFR BLD AUTO: 6 %
MUCOUS THREADS #/AREA URNS LPF: PRESENT /LPF
NEUTROPHILS # BLD AUTO: 3.7 10E3/UL (ref 1.6–8.3)
NEUTROPHILS NFR BLD AUTO: 55 %
NITRATE UR QL: NEGATIVE
NRBC # BLD AUTO: 0 10E3/UL
NRBC BLD AUTO-RTO: 0 /100
PH UR STRIP: 6 [PH] (ref 5–7)
PLATELET # BLD AUTO: 227 10E3/UL (ref 150–450)
POTASSIUM SERPL-SCNC: 3.7 MMOL/L (ref 3.4–5.3)
PROT SERPL-MCNC: 7.7 G/DL (ref 6.4–8.3)
RBC # BLD AUTO: 4.58 10E6/UL (ref 3.8–5.2)
RBC URINE: 2 /HPF
SODIUM SERPL-SCNC: 140 MMOL/L (ref 135–145)
SP GR UR STRIP: 1.03 (ref 1–1.03)
SQUAMOUS EPITHELIAL: <1 /HPF
UROBILINOGEN UR STRIP-MCNC: NORMAL MG/DL
WBC # BLD AUTO: 6.7 10E3/UL (ref 4–11)
WBC URINE: 2 /HPF

## 2024-04-03 PROCEDURE — 258N000003 HC RX IP 258 OP 636: Performed by: EMERGENCY MEDICINE

## 2024-04-03 PROCEDURE — 96361 HYDRATE IV INFUSION ADD-ON: CPT

## 2024-04-03 PROCEDURE — 93005 ELECTROCARDIOGRAM TRACING: CPT

## 2024-04-03 PROCEDURE — 250N000011 HC RX IP 250 OP 636: Performed by: EMERGENCY MEDICINE

## 2024-04-03 PROCEDURE — 80053 COMPREHEN METABOLIC PANEL: CPT | Performed by: EMERGENCY MEDICINE

## 2024-04-03 PROCEDURE — 81001 URINALYSIS AUTO W/SCOPE: CPT | Performed by: EMERGENCY MEDICINE

## 2024-04-03 PROCEDURE — 85041 AUTOMATED RBC COUNT: CPT | Performed by: EMERGENCY MEDICINE

## 2024-04-03 PROCEDURE — 74177 CT ABD & PELVIS W/CONTRAST: CPT

## 2024-04-03 PROCEDURE — 85025 COMPLETE CBC W/AUTO DIFF WBC: CPT | Performed by: EMERGENCY MEDICINE

## 2024-04-03 PROCEDURE — 99285 EMERGENCY DEPT VISIT HI MDM: CPT | Mod: 25

## 2024-04-03 PROCEDURE — 250N000009 HC RX 250: Performed by: EMERGENCY MEDICINE

## 2024-04-03 PROCEDURE — 84703 CHORIONIC GONADOTROPIN ASSAY: CPT | Performed by: EMERGENCY MEDICINE

## 2024-04-03 PROCEDURE — 96374 THER/PROPH/DIAG INJ IV PUSH: CPT | Mod: 59

## 2024-04-03 PROCEDURE — 36415 COLL VENOUS BLD VENIPUNCTURE: CPT | Performed by: EMERGENCY MEDICINE

## 2024-04-03 PROCEDURE — 96375 TX/PRO/DX INJ NEW DRUG ADDON: CPT

## 2024-04-03 RX ORDER — METOCLOPRAMIDE HYDROCHLORIDE 5 MG/ML
10 INJECTION INTRAMUSCULAR; INTRAVENOUS ONCE
Status: COMPLETED | OUTPATIENT
Start: 2024-04-03 | End: 2024-04-03

## 2024-04-03 RX ORDER — DIPHENHYDRAMINE HYDROCHLORIDE 50 MG/ML
25 INJECTION INTRAMUSCULAR; INTRAVENOUS ONCE
Status: COMPLETED | OUTPATIENT
Start: 2024-04-03 | End: 2024-04-03

## 2024-04-03 RX ORDER — IOPAMIDOL 755 MG/ML
500 INJECTION, SOLUTION INTRAVASCULAR ONCE
Status: COMPLETED | OUTPATIENT
Start: 2024-04-03 | End: 2024-04-03

## 2024-04-03 RX ORDER — KETOROLAC TROMETHAMINE 15 MG/ML
15 INJECTION, SOLUTION INTRAMUSCULAR; INTRAVENOUS ONCE
Status: COMPLETED | OUTPATIENT
Start: 2024-04-03 | End: 2024-04-03

## 2024-04-03 RX ADMIN — SODIUM CHLORIDE 1000 ML: 9 INJECTION, SOLUTION INTRAVENOUS at 19:24

## 2024-04-03 RX ADMIN — KETOROLAC TROMETHAMINE 15 MG: 15 INJECTION, SOLUTION INTRAMUSCULAR; INTRAVENOUS at 19:25

## 2024-04-03 RX ADMIN — DIPHENHYDRAMINE HYDROCHLORIDE 25 MG: 50 INJECTION INTRAMUSCULAR; INTRAVENOUS at 19:25

## 2024-04-03 RX ADMIN — IOPAMIDOL 58 ML: 755 INJECTION, SOLUTION INTRAVENOUS at 19:50

## 2024-04-03 RX ADMIN — METOCLOPRAMIDE HYDROCHLORIDE 10 MG: 5 INJECTION INTRAMUSCULAR; INTRAVENOUS at 19:25

## 2024-04-03 RX ADMIN — SODIUM CHLORIDE 54 ML: 9 INJECTION, SOLUTION INTRAVENOUS at 19:50

## 2024-04-03 ASSESSMENT — ACTIVITIES OF DAILY LIVING (ADL)
ADLS_ACUITY_SCORE: 33
ADLS_ACUITY_SCORE: 35

## 2024-04-03 ASSESSMENT — COLUMBIA-SUICIDE SEVERITY RATING SCALE - C-SSRS
1. IN THE PAST MONTH, HAVE YOU WISHED YOU WERE DEAD OR WISHED YOU COULD GO TO SLEEP AND NOT WAKE UP?: NO
6. HAVE YOU EVER DONE ANYTHING, STARTED TO DO ANYTHING, OR PREPARED TO DO ANYTHING TO END YOUR LIFE?: NO
2. HAVE YOU ACTUALLY HAD ANY THOUGHTS OF KILLING YOURSELF IN THE PAST MONTH?: NO

## 2024-04-03 NOTE — ED TRIAGE NOTES
Pt arrives with mother for L sided abdominal pain starting around 1600. She also reports headache starting around the same time, she reports when the abdominal pain flares her vision tunnels and gets blurring. Hx appendectomy. Slight nausea, dizziness. Pt is currently in middle of workup for POTS, she reports while in car on the way here her HR was between .

## 2024-04-04 ENCOUNTER — VIRTUAL VISIT (OUTPATIENT)
Dept: CARDIOLOGY | Facility: CLINIC | Age: 19
End: 2024-04-04
Attending: INTERNAL MEDICINE
Payer: COMMERCIAL

## 2024-04-04 VITALS — BODY MASS INDEX: 20.02 KG/M2 | HEIGHT: 63 IN | WEIGHT: 113 LBS

## 2024-04-04 DIAGNOSIS — R55 SYNCOPE AND COLLAPSE: Primary | ICD-10-CM

## 2024-04-04 DIAGNOSIS — I95.0 IDIOPATHIC HYPOTENSION: ICD-10-CM

## 2024-04-04 LAB
ATRIAL RATE - MUSE: 69 BPM
DIASTOLIC BLOOD PRESSURE - MUSE: NORMAL MMHG
INTERPRETATION ECG - MUSE: NORMAL
P AXIS - MUSE: -13 DEGREES
PR INTERVAL - MUSE: 106 MS
QRS DURATION - MUSE: 76 MS
QT - MUSE: 376 MS
QTC - MUSE: 402 MS
R AXIS - MUSE: 71 DEGREES
SYSTOLIC BLOOD PRESSURE - MUSE: NORMAL MMHG
T AXIS - MUSE: 13 DEGREES
VENTRICULAR RATE- MUSE: 69 BPM

## 2024-04-04 PROCEDURE — 99215 OFFICE O/P EST HI 40 MIN: CPT | Mod: 95 | Performed by: INTERNAL MEDICINE

## 2024-04-04 RX ORDER — LIDOCAINE 40 MG/G
CREAM TOPICAL
Status: CANCELLED | OUTPATIENT
Start: 2024-04-04

## 2024-04-04 RX ORDER — SODIUM CHLORIDE 9 MG/ML
INJECTION, SOLUTION INTRAVENOUS CONTINUOUS
Status: CANCELLED | OUTPATIENT
Start: 2024-04-04

## 2024-04-04 ASSESSMENT — PAIN SCALES - GENERAL: PAINLEVEL: MODERATE PAIN (5)

## 2024-04-04 NOTE — LETTER
4/4/2024      RE: Aniyah Curry  [address] [facility] [address]       Dear Colleague,    Thank you for the opportunity to participate in the care of your patient, Aniyah Curry, at the [facility]. Please see a copy of my visit note below.    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 Monet for assessment of recurrent near faints and faints 1 of which was associated with minor injury.    Jeny indicates that she has had issues with heart rate for several years and has had heart monitors in the past.  I do not have access to all of these at the present time.  However the problem seemed to come to be more serious by the end of October 2023 when she had a true syncopal episode without warning while sitting on a barstool.  She denies alcohol or drug use at that time.  Also she does not identify that they is being particularly difficult or associated with any increased physical activity or other apparent triggers.  She apparently fell and hit her chin on the bar requiring 3 stitches.  She had no recollection of any warning.  It was at about dinnertime at a restaurant.    Other episodes of syncope or near syncope in Karmen's case fall into 2 general categories.    1.  The most straightforward is orthostatic in nature with the patient recognizing that when she stands up her vision may go dark and she has to stand still and hold on for some seconds before being able to resume her planned activity.  These episodes classically last 10 to 20 seconds and she indicates that that would be true in her case as well.  In essence these episodes are immediate/initial orthostatic hypotensive episodes without any suggestion of classical OH or  "underlying neurologic disease.    2.  The more difficult to distinguish symptoms has necessitated going to the ED on a number of occasions.  Some of these are associated with sharp left-sided submammary chest pain which by history and by reports from 18 physicians is almost certainly noncardiac musculoskeletal pain.  History suggests that the pain can be worsened with deep breathing and improved with pressing on the spot.  She had been at one time treated for pleurisy with colchicine but did not take the drug.  The basis for that diagnosis is unclear.  In any event the sharp chest pain may trigger a near syncope or syncopal event and that event is often associated and its termination with her noticing that she is feeling hot or cold, dripping in sweat, very pale, and fatigued such that she would like to take a nap.  This symptoms that certainly favors autonomic activation associated with vasovagal events.      As described above Karmen may have near syncopal episodes with tunnel vision unassociated with her sharp chest pain.  However the associated autonomic complaints are usually present as best I can tell from the history and would consequently then also likely fall in the category of reflex vasovagal episodes.  A specific example is venipuncture-but in Jeny's case the faintness occurs not with the puncture itself but with the fluid infusion of an IV.  She also had an episode while seated in the back of her car but again there was no evident trigger to pinpoint.    The frequency of events seems to have increased over the last month or so.  She does complain of feeling at times tired and confused and \"out of it\".  The basis for the symptoms is not clear.    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  She does report heart rate variations that bother her but generally she was able to compete well with no evident cardiovascular limitation by history.      Patient reports " ankle swelling which does not seem to be responsive to elevation of her legs.  She has not noticed any discoloration.  Patient also is not complaining of any symptoms suggestive of Raynaud's.  The basis for the ankle swelling is unclear.    Based on Jeny's story, her syncope and near syncope likely falls into the categories of immediate orthostatic hypotension and or vasovagal syncope or both.  Other features of her complaints including variations in heart rate are less readily defined.      We will arrange for tilt table/autonomic studies to see if further diagnostic and treatment clarity can be obtained.  Jeny voiced understanding and approved the plan.  We will schedule at a convenient time for her.    PAST MEDICAL HISTORY:  Past Medical History:   Diagnosis Date     Other congenital deformity of hip (joint)     f.u.normal       CURRENT MEDICATIONS:  No current outpatient medications on file.       PAST SURGICAL HISTORY:  Past Surgical History:   Procedure Laterality Date     APPENDECTOMY       ORTHOPEDIC SURGERY Left 01/2022    labrial tear repair       ALLERGIES:     Allergies   Allergen Reactions     No Known Drug Allergy      Seasonal Allergies        FAMILY HISTORY:  Family History   Problem Relation Age of Onset     Hypothyroidism Mother      Unknown/Adopted Mother      Anesthesia Reaction Mother      Thyroid Disease Mother         Hashimoto's     Hypothyroidism Father      Thyroid Disease Father         Hypothyroidism and vitiligo     Hypothyroidism Maternal Grandmother      Thyroid Disease Maternal Grandmother         Hypothyroidism     Cerebrovascular Disease Maternal Grandfather         2014     Prostate Cancer Maternal Grandfather      Hypothyroidism Paternal Grandmother      Thyroid Disease Paternal Grandmother         Hyposthyroidism     Gastrointestinal Disease Brother 5        celiac disease     Hypothyroidism Brother      Coronary Artery Disease Paternal Grandfather      Hypertension Paternal  "Grandfather      Anxiety Disorder Paternal Grandfather      Breast Cancer Other         Aunt     Asthma Brother      Genetic Disorder Brother         Celiac Disease     Thyroid Disease Brother         Autoimmune Hypothyroidism     Genetic Disorder Cousin         JRA     SOCIAL HISTORY:  Social History     Tobacco Use     Smoking status: Never     Passive exposure: Never     Smokeless tobacco: Never   Vaping Use     Vaping Use: Never used   Substance Use Topics     Alcohol use: No     Drug use: No       ROS:   Constitutional: No fever, chills, or sweats. Weight stable.   ENT: No visual disturbance, ear ache, epistaxis, sore throat.   Cardiovascular: As per HPI.   Respiratory: No cough, hemoptysis.    GI: No nausea, vomiting,   : No hematuria.   Integument: Negative.   Psychiatric: Negative.   Hematologic:  Easy bruising, no easy bleeding.  Neuro: Negative.   Endocrinology: No significant heat or cold intolerance   Musculoskeletal: No myalgia.    Exam:  Ht 1.6 m (5' 3\")   Wt 51.3 kg (113 lb)   LMP 02/23/2024 (Exact Date)   BMI 20.02 kg/m    GENERAL APPEARANCE: healthy, alert and no distress  HEENT: no icterus,  no central cyanosis  NECK: , JVP not elevated  RESPIRATORY:  no rales, rhonchi or wheezes, no use of accessory muscles, no retractions, respirations are unlabored, normal respiratory rate  NEURO: alert and oriented to person/place/time, normal speech, gait and affect  SKIN: no ecchymoses, no rashes    Labs:  CBC RESULTS:   Lab Results   Component Value Date    WBC 6.7 04/03/2024    WBC 6.2 04/13/2014    RBC 4.58 04/03/2024    RBC 4.32 05/07/2013    HGB 14.3 04/03/2024    HGB 13.2 05/07/2013    HCT 42.2 04/03/2024    HCT 38.0 05/07/2013    MCV 92 04/03/2024    MCV 88 05/07/2013    MCH 31.2 04/03/2024    MCH 30.6 05/07/2013    MCHC 33.9 04/03/2024    MCHC 34.7 05/07/2013    RDW 11.8 04/03/2024    RDW 11.9 05/07/2013     04/03/2024     05/07/2013       BMP RESULTS:  Lab Results   Component Value " "Date     04/03/2024     07/03/2012    POTASSIUM 3.7 04/03/2024    POTASSIUM 3.4 12/30/2021    POTASSIUM 4.0 07/03/2012    CHLORIDE 105 04/03/2024    CHLORIDE 105 12/30/2021    CHLORIDE 104 07/03/2012    CO2 21 (L) 04/03/2024    CO2 28 12/30/2021    CO2 26 07/03/2012    ANIONGAP 14 04/03/2024    ANIONGAP 5 12/30/2021    ANIONGAP 9 07/03/2012     (H) 04/03/2024    GLC 93 12/30/2021    GLC 94 07/03/2012    BUN 10.0 04/03/2024    BUN 10 12/30/2021    BUN 14 07/03/2012    CR 0.66 04/03/2024    CR 0.33 07/03/2012    GFRESTIMATED >90 04/03/2024    GFRESTIMATED GFR not calculated, patient <16 years old. 07/03/2012    GFRESTBLACK GFR not calculated, patient <16 years old. 07/03/2012    CHRISSIE 9.1 04/03/2024    CHRISSIE 9.5 07/03/2012        INR RESULTS:  No results found for: \"INR\"    Procedures:  PULMONARY FUNCTION TESTS:        No data to display                  ECHOCARDIOGRAM:   No results found for this or any previous visit (from the past 8760 hour(s)).      Assessment and Plan:   1.  Recurrent syncope and near syncope-some due to immediate orthostatic hypotension and other is likely vasovagal reactions with unclear triggers  2.  Noncardiac left-sided chest pain-recurrent  3.  No evident underlying structural heart disease    Plan.  1.  Please arrange for tilt/autonomic testing at convenient time.  I have asked the patient to be sure that she comes with a .  2.  Follow-up video visit about 2 months after 1    Total elapsed time today with chart review, clinic visit and documentation 1 hour  Video on 4: 30 p.m. off 5: 15 p.m.  Platform Doximity  Patient at home; clinic [facility] heart    I very much appreciated the opportunity to see and assess Aniyah Curry in the clinic today. Please do not hesitate to contact my office if you have any questions or concerns.      Issac Jiménez MD  Cardiac Arrhythmia Service  [facility]  [phone]       CC  WYATT RUIZ MD  [facility] " Geovanna VALENCIA MD, MD

## 2024-04-04 NOTE — NURSING NOTE
Patient confirms medications and allergies are accurate via patients echeck in completion, and or denies any changes since last reviewed/verified.       Is the patient currently in the state of [place]? YES    Visit mode:VIDEO    If the visit is dropped, the patient can be reconnected by: VIDEO VISIT: Text to cell phone:   Telephone Information:   Mobile [phone]       Will anyone else be joining the visit? Radha Curry. Mom  (If patient encounters technical issues they should call [phone]    How would you like to obtain your AVS? MyChart    Are changes needed to the allergy or medication list? No    Are refills needed on medications prescribed by this physician? NO    Reason for visit: Consult    Radha CALLAHANF

## 2024-04-04 NOTE — PROGRESS NOTES
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 Monet for assessment of recurrent near faints and faints 1 of which was associated with minor injury.    Jeny indicates that she has had issues with heart rate for several years and has had heart monitors in the past.  I do not have access to all of these at the present time.  However the problem seemed to come to be more serious by the end of October 2023 when she had a true syncopal episode without warning while sitting on a barstool.  She denies alcohol or drug use at that time.  Also she does not identify that they is being particularly difficult or associated with any increased physical activity or other apparent triggers.  She apparently fell and hit her chin on the bar requiring 3 stitches.  She had no recollection of any warning.  It was at about dinnertime at a restaurant.    Other episodes of syncope or near syncope in Karmen's case fall into 2 general categories.    1.  The most straightforward is orthostatic in nature with the patient recognizing that when she stands up her vision may go dark and she has to stand still and hold on for some seconds before being able to resume her planned activity.  These episodes classically last 10 to 20 seconds and she indicates that that would be true in her case as well.  In essence these episodes are immediate/initial orthostatic hypotensive episodes without any suggestion of classical OH or underlying neurologic disease.    2.  The more difficult to distinguish symptoms has necessitated going to the ED on a number of occasions.  Some of these are associated with sharp left-sided submammary chest pain which by history and by reports from 18 physicians is almost certainly noncardiac musculoskeletal pain.  History suggests that the pain  "can be worsened with deep breathing and improved with pressing on the spot.  She had been at one time treated for pleurisy with colchicine but did not take the drug.  The basis for that diagnosis is unclear.  In any event the sharp chest pain may trigger a near syncope or syncopal event and that event is often associated and its termination with her noticing that she is feeling hot or cold, dripping in sweat, very pale, and fatigued such that she would like to take a nap.  This symptoms that certainly favors autonomic activation associated with vasovagal events.      As described above Karmen may have near syncopal episodes with tunnel vision unassociated with her sharp chest pain.  However the associated autonomic complaints are usually present as best I can tell from the history and would consequently then also likely fall in the category of reflex vasovagal episodes.  A specific example is venipuncture-but in Jeny's case the faintness occurs not with the puncture itself but with the fluid infusion of an IV.  She also had an episode while seated in the back of her car but again there was no evident trigger to pinpoint.    The frequency of events seems to have increased over the last month or so.  She does complain of feeling at times tired and confused and \"out of it\".  The basis for the symptoms is not clear.    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  She does report heart rate variations that bother her but generally she was able to compete well with no evident cardiovascular limitation by history.      Patient reports ankle swelling which does not seem to be responsive to elevation of her legs.  She has not noticed any discoloration.  Patient also is not complaining of any symptoms suggestive of Raynaud's.  The basis for the ankle swelling is unclear.    Based on Jeny's story, her syncope and near syncope likely falls into the categories of immediate orthostatic " hypotension and or vasovagal syncope or both.  Other features of her complaints including variations in heart rate are less readily defined.      We will arrange for tilt table/autonomic studies to see if further diagnostic and treatment clarity can be obtained.  Jeny voiced understanding and approved the plan.  We will schedule at a convenient time for her.    PAST MEDICAL HISTORY:  Past Medical History:   Diagnosis Date    Other congenital deformity of hip (joint)     f.u.normal       CURRENT MEDICATIONS:  No current outpatient medications on file.       PAST SURGICAL HISTORY:  Past Surgical History:   Procedure Laterality Date    APPENDECTOMY      ORTHOPEDIC SURGERY Left 01/2022    labrial tear repair       ALLERGIES:     Allergies   Allergen Reactions    No Known Drug Allergy     Seasonal Allergies        FAMILY HISTORY:  Family History   Problem Relation Age of Onset    Hypothyroidism Mother     Unknown/Adopted Mother     Anesthesia Reaction Mother     Thyroid Disease Mother         Hashimoto's    Hypothyroidism Father     Thyroid Disease Father         Hypothyroidism and vitiligo    Hypothyroidism Maternal Grandmother     Thyroid Disease Maternal Grandmother         Hypothyroidism    Cerebrovascular Disease Maternal Grandfather         2014    Prostate Cancer Maternal Grandfather     Hypothyroidism Paternal Grandmother     Thyroid Disease Paternal Grandmother         Hyposthyroidism    Gastrointestinal Disease Brother 5        celiac disease    Hypothyroidism Brother     Coronary Artery Disease Paternal Grandfather     Hypertension Paternal Grandfather     Anxiety Disorder Paternal Grandfather     Breast Cancer Other         Aunt    Asthma Brother     Genetic Disorder Brother         Celiac Disease    Thyroid Disease Brother         Autoimmune Hypothyroidism    Genetic Disorder Cousin         JRA     SOCIAL HISTORY:  Social History     Tobacco Use    Smoking status: Never     Passive exposure: Never     "Smokeless tobacco: Never   Vaping Use    Vaping Use: Never used   Substance Use Topics    Alcohol use: No    Drug use: No       ROS:   Constitutional: No fever, chills, or sweats. Weight stable.   ENT: No visual disturbance, ear ache, epistaxis, sore throat.   Cardiovascular: As per HPI.   Respiratory: No cough, hemoptysis.    GI: No nausea, vomiting,   : No hematuria.   Integument: Negative.   Psychiatric: Negative.   Hematologic:  Easy bruising, no easy bleeding.  Neuro: Negative.   Endocrinology: No significant heat or cold intolerance   Musculoskeletal: No myalgia.    Exam:  Ht 1.6 m (5' 3\")   Wt 51.3 kg (113 lb)   LMP 02/23/2024 (Exact Date)   BMI 20.02 kg/m    GENERAL APPEARANCE: healthy, alert and no distress  HEENT: no icterus,  no central cyanosis  NECK: , JVP not elevated  RESPIRATORY:  no rales, rhonchi or wheezes, no use of accessory muscles, no retractions, respirations are unlabored, normal respiratory rate  NEURO: alert and oriented to person/place/time, normal speech, gait and affect  SKIN: no ecchymoses, no rashes    Labs:  CBC RESULTS:   Lab Results   Component Value Date    WBC 6.7 04/03/2024    WBC 6.2 04/13/2014    RBC 4.58 04/03/2024    RBC 4.32 05/07/2013    HGB 14.3 04/03/2024    HGB 13.2 05/07/2013    HCT 42.2 04/03/2024    HCT 38.0 05/07/2013    MCV 92 04/03/2024    MCV 88 05/07/2013    MCH 31.2 04/03/2024    MCH 30.6 05/07/2013    MCHC 33.9 04/03/2024    MCHC 34.7 05/07/2013    RDW 11.8 04/03/2024    RDW 11.9 05/07/2013     04/03/2024     05/07/2013       BMP RESULTS:  Lab Results   Component Value Date     04/03/2024     07/03/2012    POTASSIUM 3.7 04/03/2024    POTASSIUM 3.4 12/30/2021    POTASSIUM 4.0 07/03/2012    CHLORIDE 105 04/03/2024    CHLORIDE 105 12/30/2021    CHLORIDE 104 07/03/2012    CO2 21 (L) 04/03/2024    CO2 28 12/30/2021    CO2 26 07/03/2012    ANIONGAP 14 04/03/2024    ANIONGAP 5 12/30/2021    ANIONGAP 9 07/03/2012     (H) " "04/03/2024    GLC 93 12/30/2021    GLC 94 07/03/2012    BUN 10.0 04/03/2024    BUN 10 12/30/2021    BUN 14 07/03/2012    CR 0.66 04/03/2024    CR 0.33 07/03/2012    GFRESTIMATED >90 04/03/2024    GFRESTIMATED GFR not calculated, patient <16 years old. 07/03/2012    GFRESTBLACK GFR not calculated, patient <16 years old. 07/03/2012    CHRISSIE 9.1 04/03/2024    CHRISSIE 9.5 07/03/2012        INR RESULTS:  No results found for: \"INR\"    Procedures:  PULMONARY FUNCTION TESTS:        No data to display                  ECHOCARDIOGRAM:   No results found for this or any previous visit (from the past 8760 hour(s)).      Assessment and Plan:   1.  Recurrent syncope and near syncope-some due to immediate orthostatic hypotension and other is likely vasovagal reactions with unclear triggers  2.  Noncardiac left-sided chest pain-recurrent  3.  No evident underlying structural heart disease    Plan.  1.  Please arrange for tilt/autonomic testing at convenient time.  I have asked the patient to be sure that she comes with a .  2.  Follow-up video visit about 2 months after 1    Total elapsed time today with chart review, clinic visit and documentation 1 hour  Video on 4: 30 p.m. off 5: 15 p.m.  Platform Doximity  Patient at home; clinic [facility] heart    I very much appreciated the opportunity to see and assess Aniyah Curry in the clinic today. Please do not hesitate to contact my office if you have any questions or concerns.      Issac Jiménez MD  Cardiac Arrhythmia Service  [facility]  [phone]       CC  WYATT RUIZ MD, MD, Christine MD  "

## 2024-04-04 NOTE — ED PROVIDER NOTES
"  History     Chief Complaint:  Abdominal Pain     HPI   Aniyah Curry is a 18 year old female with a past medical history significant for PFO, NSVT who presents to the ED via/accompanied by her mother with a chief complaint of left upper abdominal pain. The patient reports she has had sharp, left upper abdominal pain over the last several weeks and today at 1630 developed similar sharp left upper abdominal pain, currently rated 7/10. She notes temporal headaches and \"blurry and tunnel vision\" with episodes of her abdominal pain. Her pain does not radiate or change with movement or deep breathing. The patient denies nausea, vomiting, cough, fevers, rhinorrhea, dysuria, urinary frequency. She denies history of migraines or family history of blood clots. LMP 3/15/24. The patient was evaluated at the ED on 3/24/24 for her abdominal pain and headache, and is currently being worked up for POTs.     Independent Historian: history provided by the patient.     Review of External Notes: See MDM    Allergies:  No Known Drug Allergy  Seasonal Allergies     Medications:    No current outpatient medications on file.    Past Medical History:    Past Medical History:   Diagnosis Date    Other congenital deformity of hip (joint)      Past Surgical History:    Past Surgical History:   Procedure Laterality Date    APPENDECTOMY      ORTHOPEDIC SURGERY Left 01/2022    labrial tear repair      Family History:    family history includes Anesthesia Reaction in her mother; Anxiety Disorder in her paternal grandfather; Asthma in her brother; Breast Cancer in an other family member; Cerebrovascular Disease in her maternal grandfather; Coronary Artery Disease in her paternal grandfather; Gastrointestinal Disease (age of onset: 5) in her brother; Genetic Disorder in her brother and cousin; Hypertension in her paternal grandfather; Hypothyroidism in her brother, father, maternal grandmother, mother, and paternal grandmother; Prostate Cancer " "in her maternal grandfather; Thyroid Disease in her brother, father, maternal grandmother, mother, and paternal grandmother; Unknown/Adopted in her mother.    Social History:   reports that she has never smoked. She has never been exposed to tobacco smoke. She has never used smokeless tobacco. She reports that she does not drink alcohol and does not use drugs.  PCP: Maria Elena Albarado     Physical Exam   Patient Vitals for the past 24 hrs:   BP Temp Temp src Pulse Resp SpO2 Height Weight   04/03/24 1757 (!) 127/90 98.9  F (37.2  C) Temporal 91 20 100 % 1.6 m (5' 3\") 51.8 kg (114 lb 3.2 oz)        Physical Exam  Constitutional: Well developed, forlorn, nontox appearance  Head: Atraumatic.   Neck:  no stridor  Eyes: no scleral icterus  Cardiovascular: RRR, 2+ bilat radial pulses  Pulmonary/Chest: nml resp effort, Clear BS bilat  Abdominal: ND, soft, NT (unable to reproduce pain in left upper quadrant), no rebound or guarding   : Minimal left CVA tenderness  Ext: Warm, well perfused  Neurological: A&O, symmetric facies, moves ext x4  Skin: Skin is warm and dry.   Psychiatric: Behavior is normal. Thought content normal.   Nursing note and vitals reviewed.    Emergency Department Course   ECG  ECG taken at 1815, ECG read at 1900  No STEMI  Sinus rhythm with short NM  Otherwise normal ECG  No change as compared to prior, dated 3/24/24.  Rate 69 bpm. NM interval 106 ms. QRS duration 76 ms. QT/QTc 376/402 ms. P-R-T axes -13/ 71/ 13.     Imaging:  CT Abdomen Pelvis w Contrast   Final Result   IMPRESSION:    1.  No acute findings in the abdomen and pelvis.            Report per radiology unless otherwise specified in report or noted in MDM    Laboratory:  Labs Ordered and Resulted from Time of ED Arrival to Time of ED Departure   COMPREHENSIVE METABOLIC PANEL - Abnormal       Result Value    Sodium 140      Potassium 3.7      Carbon Dioxide (CO2) 21 (*)     Anion Gap 14      Urea Nitrogen 10.0      Creatinine 0.66      GFR " Estimate >90      Calcium 9.1      Chloride 105      Glucose 107 (*)     Alkaline Phosphatase 47      AST 25      ALT 16      Protein Total 7.7      Albumin 4.5      Bilirubin Total 0.2     ROUTINE UA WITH MICROSCOPIC REFLEX TO CULTURE - Abnormal    Color Urine Yellow      Appearance Urine Clear      Glucose Urine Negative      Bilirubin Urine Negative      Ketones Urine Negative      Specific Gravity Urine 1.028      Blood Urine Negative      pH Urine 6.0      Protein Albumin Urine Negative      Urobilinogen Urine Normal      Nitrite Urine Negative      Leukocyte Esterase Urine Negative      Mucus Urine Present (*)     RBC Urine 2      WBC Urine 2      Squamous Epithelials Urine <1     HCG QUALITATIVE PREGNANCY - Normal    hCG Serum Qualitative Negative     CBC WITH PLATELETS AND DIFFERENTIAL    WBC Count 6.7      RBC Count 4.58      Hemoglobin 14.3      Hematocrit 42.2      MCV 92      MCH 31.2      MCHC 33.9      RDW 11.8      Platelet Count 227      % Neutrophils 55      % Lymphocytes 38      % Monocytes 6      % Eosinophils 1      % Basophils 0      % Immature Granulocytes 0      NRBCs per 100 WBC 0      Absolute Neutrophils 3.7      Absolute Lymphocytes 2.5      Absolute Monocytes 0.4      Absolute Eosinophils 0.0      Absolute Basophils 0.0      Absolute Immature Granulocytes 0.0      Absolute NRBCs 0.0        Emergency Department Course & Assessments:     Interventions:  Medications   metoclopramide (REGLAN) injection 10 mg (10 mg Intravenous $Given 4/3/24 1925)   diphenhydrAMINE (BENADRYL) injection 25 mg (25 mg Intravenous $Given 4/3/24 1925)   ketorolac (TORADOL) injection 15 mg (15 mg Intravenous $Given 4/3/24 1925)   sodium chloride 0.9% BOLUS 1,000 mL (1,000 mLs Intravenous $New Bag 4/3/24 1924)   CT scan flush (54 mLs Intravenous $Given 4/3/24 1950)   iopamidol (ISOVUE-370) solution 500 mL (58 mLs Intravenous $Given 4/3/24 1950)      Independent Interpretation (X-rays, CTs, rhythm strip):  See  MDM    Consultations/Discussion of Management or Tests:  None     Social Determinants of Health affecting care:  See MDM    Disposition:  The patient was discharged to home.     Impression & Plan    CMS Diagnoses: None    MIPS (If applicable): N/A    Medical Decision Makin year old female presenting w/ left flank pain    Social determinants affecting patient's health include: No significant social determinants negatively affecting the patient's health     I reviewed medical records from ED visit from 3/19/2024 and 3/24/2024, renal ultrasound result from 3/25/2024    DDx includes flank pain NOS, abdominal migraine, colitis, splenic infarction, vascular abnormality other doubt vascular catastrophe such as dissection, AAA.  Less likely PE given workup for PE with negative D-dimer approximately 10 days ago and context of similar intermittent symptoms.   Labs significant for no remarkable abnormality.  I think would be reasonable to obtain CT imaging for an overview of the patient's abdomen given previous negative workups and ongoing symptoms.  Radiation risk was discussed with the patient and her mother and they are comfortable with proceeding.  Imaging sig for no acute abnormality as described above.  Interventions as noted above.  Given reassuring workup, at this time I feel the pt is safe for discharge.  Unknown etiology of the patient's flank pain but it does not appear to be acutely dangerous or life-threatening.  She will likely require further outpatient workup.  Recommendations given regarding follow up with PCP and return to the emergency department as needed for new or worsening symptoms.  Pt counseled on all results, disposition and diagnosis.  They are understanding and agreeable to plan. Patient discharged in stable condition.      Diagnosis:    ICD-10-CM    1. Left flank pain  R10.9       2. Acute nonintractable headache, unspecified headache type  R51.9            Discharge Medications:  New  Prescriptions    No medications on file      Scribe Disclosure:   I, Issac Solorio, am serving as a scribe; to document services personally performed by No name on file -based on data collection and the provider's statements to me.     Provider Disclosure:  I agree with above History, Review of Systems, Physical exam and Plan.  I have reviewed the content of the documentation and have edited it as needed. I have personally performed the services documented here and the documentation accurately represents those services and the decisions I have made.      Electronically signed by:  4/3/2024   Ricky Bronson MD Vaughn, Christopher E, MD  04/04/24 0222

## 2024-04-04 NOTE — DISCHARGE INSTRUCTIONS
-Take acetaminophen 500 to 1000 mg by mouth every 4 to 6 hours as needed for pain or fever.  Do not take more than 4000 mg in 24 hours.  Do not take within 6 hours of another acetaminophen containing medication such as norco (vicodin) or percocet.  - Take ibuprofen 600 to 800 mg by mouth every 6 to 8 hours as needed for pain or fever    Please follow-up with your primary care team for reevaluation and further management.    Please return to the emergency department as needed for new or worsening symptoms including severe and uncontrollable pain, vomiting and unable to keep anything down, vomiting blood, black or bloody bowel movements, severe shortness of breath, fainting, any other concerning symptoms.    Discharge Instructions  Abdominal Pain    Abdominal pain (belly pain) can be caused by many things. Your evaluation today does not show the exact cause for your pain. Your provider today has decided that it is unlikely your pain is due to a life threatening problem, or a problem requiring surgery or hospital admission. Sometimes those problems cannot be found right away, so it is very important that you follow up as directed.  Sometimes only the changes which occur over time allow the cause of your pain to be found.    Generally, every Emergency Department visit should have a follow-up clinic visit with either a primary or a specialty clinic/provider. Please follow-up as instructed by your emergency provider today. With abdominal pain, we often recommend very close follow-up, such as the following day.    ADULTS:  Return to the Emergency Department right away if:    You get an oral temperature above 102oF or as directed by your provider.  You have blood in your stools. This may be bright red or appear as black, tarry stools.    You keep vomiting (throwing up) or cannot drink liquids.  You see blood when you vomit.   You cannot have a bowel movement or you cannot pass gas.  Your stomach gets bloated or  bigger.  Your skin or the whites of your eyes look yellow.  You faint.  You have bloody, frequent or painful urination (peeing).  You have new symptoms or anything that worries you.    CHILDREN:  Return to the Emergency Department right away if your child has any of the above-listed symptoms or the following:    Pushes your hand away or screams/cries when his/her belly is touched.  You notice your child is very fussy or weak.  Your child is very tired and is too tired to eat or drink.  Your child is dehydrated.  Signs of dehydration can be:  Significant change in the amount of wet diapers/urine.  Your infant or child starts to have dry mouth and lips, or no saliva (spit) or tears.    PREGNANT WOMEN:  Return to the Emergency Department right away if you have any of the above-listed symptoms or the following:    You have bleeding, leaking fluid or passing tissue from the vagina.  You have worse pain or cramping, or pain in your shoulder or back.  You have vomiting that will not stop.  You have a temperature of 100oF or more.  Your baby is not moving as much as usual.  You faint.  You get a bad headache with or without eye problems and abdominal pain.  You have a seizure.  You have unusual discharge from your vagina and abdominal pain.    Abdominal pain is pretty common during pregnancy.  Your pain may or may not be related to your pregnancy. You should follow-up closely with your OB provider so they can evaluate you and your baby.  Until you follow-up with your regular provider, do the following:     Avoid sex and do not put anything in your vagina.  Drink clear fluids.  Only take medications approved by your provider.    MORE INFORMATION:    Appendicitis:  A possible cause of abdominal pain in any person who still has their appendix is acute appendicitis. Appendicitis is often hard to diagnose.  Testing does not always rule out early appendicitis or other causes of abdominal pain. Close follow-up with your provider  "and re-evaluations may be needed to figure out the reason for your abdominal pain.    Follow-up:  It is very important that you make an appointment with your clinic and go to the appointment.  If you do not follow-up with your primary provider, it may result in missing an important development which could result in permanent injury or disability and/or lasting pain.  If there is any problem keeping your appointment, call your provider or return to the Emergency Department.    Medications:  Take your medications as directed by your provider today.  Before using over-the-counter medications, ask your provider and make sure to take the medications as directed.  If you have any questions about medications, ask your provider.    Diet:  Resume your normal diet as much as possible, but do not eat fried, fatty or spicy foods while you have pain.  Do not drink alcohol or have caffeine.  Do not smoke tobacco.    Probiotics: If you have been given an antibiotic, you may want to also take a probiotic pill or eat yogurt with live cultures. Probiotics have \"good bacteria\" to help your intestines stay healthy. Studies have shown that probiotics help prevent diarrhea (loose stools) and other intestine problems (including C. diff infection) when you take antibiotics. You can buy these without a prescription in the pharmacy section of the store.     If you were given a prescription for medicine here today, be sure to read all of the information (including the package insert) that comes with your prescription.  This will include important information about the medicine, its side effects, and any warnings that you need to know about.  The pharmacist who fills the prescription can provide more information and answer questions you may have about the medicine.  If you have questions or concerns that the pharmacist cannot address, please call or return to the Emergency Department.       Remember that you can always come back to the " Emergency Department if you are not able to see your regular provider in the amount of time listed above, if you get any new symptoms, or if there is anything that worries you.

## 2024-04-04 NOTE — PATIENT INSTRUCTIONS
You were seen in the Electrophysiology Clinic today by: Dr Jiménez    Plan:   Autonomic/tilt table test  Follow up 2-3 months after      If you have further questions, please utilize [employer] App to contact us.     Your Care Team:    [facility]   Telephone Number     Nurse Line  Hilda Batres, RN   Concepcion Berg, RN  Tarun Gabriel, LENIN   [phone]     For scheduling appointments:   Therese   [phone]   For procedure scheduling:    Denise Francis     [phone]   For the Device Clinic (Pacemakers, ICDs, Loop Recorders)    During business hours: [phone]  After business hours:   [phone]- select option 4 and ask for job code [id number].       On-call cardiologist for after hours or on weekends:   [phone], option #4, and ask to speak to the on-call cardiologist.     Cardiovascular Clinic:   [address] [place] [address]. [place] [address]      As always, Thank you for trusting us with your health care needs!

## 2024-04-05 ENCOUNTER — MYC MEDICAL ADVICE (OUTPATIENT)
Dept: PEDIATRICS | Facility: CLINIC | Age: 19
End: 2024-04-05
Payer: COMMERCIAL

## 2024-04-05 NOTE — TELEPHONE ENCOUNTER
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 Albarado, please review and advise.     Teena GARCIA RN   [facility]

## 2024-04-05 NOTE — LETTER
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 Please allow Aniyah to take breaks as needed if she is experiencing any symptoms.       Sincerely,        Maria Elena Albarado MD

## 2024-04-06 ENCOUNTER — TRANSFERRED RECORDS (OUTPATIENT)
Dept: HEALTH INFORMATION MANAGEMENT | Facility: CLINIC | Age: 19
End: 2024-04-06
Payer: COMMERCIAL

## 2024-04-07 ENCOUNTER — MYC MEDICAL ADVICE (OUTPATIENT)
Dept: PEDIATRICS | Facility: CLINIC | Age: 19
End: 2024-04-07
Payer: COMMERCIAL

## 2024-04-08 NOTE — TELEPHONE ENCOUNTER
Patient is scheduled for an ED follow-up with Dr. Albarado on 4/15/2024.     Appointments in Next Year      Apr 15, 2024  8:20 AM  (Arrive by 8:05 AM)  ED/Hospital Follow Up with Maria Elena Albarado MD  [facility] ([facility] ) [phone]     Dr. Albarado, please review and advise. Is patient okay to wait until 4/15/2024 for ED follow-up and further advice?     Teena GARCIA RN   [facility]

## 2024-04-08 NOTE — TELEPHONE ENCOUNTER
"See patient's MyChart message   - Patient states that she was seen at the [facility] ED on Wednesday (4/3/2024)   - Patient states that she was informed to alternate between Tylenol and Advil   - Patient reports an increase in swelling and pain   - Patient wondering how to proceed     Upon chart review:   - 4/3/2024 ED note states, \"Recommendations given regarding follow up with PCP and return to the emergency department as needed for new or worsening\"     \"Follow-Ups    Follow up with [facility] Emergency Dept (EMERGENCY MEDICINE)  Follow up with Maria Elena Albarado MD (Internal Medicine)\"     Sent a [employer] message to the patient   - Recommended that the patient schedule an ED follow-up appointment     TC, please assist the patient in scheduling an ED follow-up appointment.     Teena GARCIA RN   [facility]   "

## 2024-04-15 ENCOUNTER — OFFICE VISIT (OUTPATIENT)
Dept: PEDIATRICS | Facility: CLINIC | Age: 19
End: 2024-04-15
Payer: COMMERCIAL

## 2024-04-15 ENCOUNTER — TELEPHONE (OUTPATIENT)
Dept: PEDIATRICS | Facility: CLINIC | Age: 19
End: 2024-04-15

## 2024-04-15 VITALS
WEIGHT: 111.6 LBS | HEIGHT: 63 IN | RESPIRATION RATE: 18 BRPM | OXYGEN SATURATION: 97 % | DIASTOLIC BLOOD PRESSURE: 67 MMHG | HEART RATE: 88 BPM | TEMPERATURE: 98.2 F | SYSTOLIC BLOOD PRESSURE: 100 MMHG | BODY MASS INDEX: 19.77 KG/M2

## 2024-04-15 DIAGNOSIS — G44.52 NEW DAILY PERSISTENT HEADACHE: Primary | ICD-10-CM

## 2024-04-15 DIAGNOSIS — R42 POSTURAL DIZZINESS: ICD-10-CM

## 2024-04-15 DIAGNOSIS — R10.12 LUQ ABDOMINAL PAIN: ICD-10-CM

## 2024-04-15 LAB
ALBUMIN SERPL BCG-MCNC: 4.7 G/DL (ref 3.5–5.2)
ALP SERPL-CCNC: 45 U/L (ref 40–150)
ALT SERPL W P-5'-P-CCNC: 19 U/L (ref 0–50)
AST SERPL W P-5'-P-CCNC: 26 U/L (ref 0–35)
BILIRUB DIRECT SERPL-MCNC: <0.2 MG/DL (ref 0–0.3)
BILIRUB SERPL-MCNC: 0.5 MG/DL
D DIMER PPP FEU-MCNC: <0.27 UG/ML FEU (ref 0–0.5)
LIPASE SERPL-CCNC: 32 U/L (ref 13–60)
PROT SERPL-MCNC: 7.9 G/DL (ref 6.4–8.3)

## 2024-04-15 PROCEDURE — 83690 ASSAY OF LIPASE: CPT | Performed by: INTERNAL MEDICINE

## 2024-04-15 PROCEDURE — 99215 OFFICE O/P EST HI 40 MIN: CPT | Performed by: INTERNAL MEDICINE

## 2024-04-15 PROCEDURE — 85379 FIBRIN DEGRADATION QUANT: CPT | Performed by: INTERNAL MEDICINE

## 2024-04-15 PROCEDURE — 36415 COLL VENOUS BLD VENIPUNCTURE: CPT | Performed by: INTERNAL MEDICINE

## 2024-04-15 PROCEDURE — 99417 PROLNG OP E/M EACH 15 MIN: CPT | Performed by: INTERNAL MEDICINE

## 2024-04-15 PROCEDURE — 80076 HEPATIC FUNCTION PANEL: CPT | Performed by: INTERNAL MEDICINE

## 2024-04-15 RX ORDER — OXYCODONE HYDROCHLORIDE 5 MG/1
2.5-5 TABLET ORAL EVERY 6 HOURS PRN
Qty: 2 TABLET | Refills: 0 | Status: SHIPPED | OUTPATIENT
Start: 2024-04-15 | End: 2024-04-18

## 2024-04-15 ASSESSMENT — PAIN SCALES - GENERAL: PAINLEVEL: NO PAIN (0)

## 2024-04-15 NOTE — TELEPHONE ENCOUNTER
Release of information faxed to [facility]    Fax: [phone]  Phone: [phone]

## 2024-04-15 NOTE — PROGRESS NOTES
Assessment & Plan       New daily persistent headache  New headache approx 2 months after starting OCP and 1 week after switching to depo provera. Symptoms have some features of migraine, with concerning feature of waking her up at night on occasion and new headache. Given hormonal contraceptives, plan MRI/MRV brain to evaluate for possible venous sinus thrombosis. If this is negative, discussed option to try migraine medication. If this is due to depo, may take 2 more months to improve.   - MR Brain w/o & w Contrast; Future  - oxyCODONE (ROXICODONE) 5 MG tablet; Take 0.5-1 tablets (2.5-5 mg) by mouth every 6 hours as needed for severe pain  - D dimer, quantitative; Future  - D dimer, quantitative  - MRV Brain with Contrast; Future    LUQ abdominal pain  Report last AST at OSH was 50. Plan repeat. Symptoms have resolved but on and off sternal pain and recent LUQ pain. Plan short 2-4 week course of omeprazole. Recheck lab.   - Hepatic function panel; Future  - Lipase; Future  - omeprazole (PRILOSEC) 20 MG DR capsule; Take 1 capsule (20 mg) by mouth daily  - Hepatic function panel  - Lipase    Postural dizziness  Stable. Has tilt table testing scheduled in 4 weeks. I don't think she is adequately hydrated based on her description of urine concentration. Discussed high sodium diet and really increasing fluids to goal 40-60 oz/day.     66 minutes spent by me on the date of the encounter doing chart review, history and exam, documentation and further activities per the note    MED REC REQUIRED  Post Medication Reconciliation Status: discharge medications reconciled, continue medications without change    See Patient Instructions    Katty Fermin is a 18 year old, presenting for the following health issues:  Follow Up ([facility] - Cardiology with Issac Jiménez MD/[facility] Emergency Dept. - Abdominal pain /)        4/15/2024     7:58 AM  "  Additional Questions   Roomed by DUANE Pickard   Accompanied by Mother         4/15/2024     7:58 AM   Patient Reported Additional Medications   Patient reports taking the following new medications N/A     HPI       ED/UC Followup:    Facility:  [facility] Emergency Dept & [facility] cardiology   Date of visit: 4/3/24 & 4/6/24  Reason for visit: abdominal pain   Current Status: no pain     In ER 4/6 hospital in [place].    Headaches have been bad recently.  New in the last few months. Hasn't really had headaches in the past. Started end of feb and also after 30 day period.    2/23 - 3/15 had period. Dr. Sandoval changed OCP. On low dose aviane, then seasonique Jan 2024, switched to Depo 3/6. Headaches she thinks started after depo shot.   -headaches are daily. If LUQ pain is increased, headache is worse. Headaches in ER. Couple nights she could sleep due to terrible headaches. Pounding frontal headaches. Light bothers her. No phonophobia. No nausea or vomiting. Dad has occas migraines.   Sometimes AM headache. Sometimes headaches wake her up in middle of night.    Taking 2 tylenol every day. Max of 3 doses/day.     Taking time off school.     Ongoing LUQ tenderness. Side pain started 4/5, mom noted swelling of LUQ. Not constant. Doesn't have right now. Feels like this has been going away. No provocative/palliative factors.     Prior would get sharp pain in sternum in last 6 months, another time had pain more along side.           Review of Systems  Constitutional, neuro, ENT, endocrine, pulmonary, cardiac, gastrointestinal, genitourinary, musculoskeletal, integument and psychiatric systems are negative, except as otherwise noted.      Objective    /67   Pulse 88   Temp 98.2  F (36.8  C) (Oral)   Resp 18   Ht 1.61 m (5' 3.39\")   Wt 50.6 kg (111 lb 9.6 oz)   LMP 04/10/2024 (Exact Date)   SpO2 97%   Breastfeeding No   BMI 19.53 kg/m    Body mass " index is 19.53 kg/m .  Physical Exam   GENERAL: alert and no distress  NECK: no adenopathy, no asymmetry, masses, or scars  RESP: lungs clear to auscultation - no rales, rhonchi or wheezes  CV: regular rate and rhythm, normal S1 S2, no S3 or S4, no murmur, click or rub, no peripheral edema  ABDOMEN: soft, nontender, no hepatosplenomegaly, no masses and bowel sounds normal  MS: no gross musculoskeletal defects noted, no edema  NEURO: Normal strength and tone, sensory exam grossly normal, mentation intact, speech normal, cranial nerves 2-12 intact, DTR's normal and symmetric, gait normal including heel/toe/tandem walking, Romberg normal, and rapid alternating movements normal          Signed Electronically by: Maria Elena Albarado MD

## 2024-04-15 NOTE — PATIENT INSTRUCTIONS
[facility] radiology will call you to schedule your MRI brain. Please call them at [phone] if you have not heard from then in the next 1-2 days.    If this is normal, we could try a migraine medication.  In the meanwhile, do not take more than 3 doses per week of tylenol for headaches to prevent rebound headaches. Limit ibuprofen to 3 doses/week.     Stay hydrated. Shoot for 60 oz day. At least 40.

## 2024-04-22 ENCOUNTER — MYC MEDICAL ADVICE (OUTPATIENT)
Dept: PEDIATRICS | Facility: CLINIC | Age: 19
End: 2024-04-22
Payer: COMMERCIAL

## 2024-04-22 ENCOUNTER — HOSPITAL ENCOUNTER (OUTPATIENT)
Dept: MRI IMAGING | Facility: CLINIC | Age: 19
Discharge: HOME | End: 2024-04-22
Attending: INTERNAL MEDICINE | Admitting: INTERNAL MEDICINE
Payer: COMMERCIAL

## 2024-04-22 DIAGNOSIS — G44.52 NEW DAILY PERSISTENT HEADACHE: ICD-10-CM

## 2024-04-22 DIAGNOSIS — G44.52 NEW DAILY PERSISTENT HEADACHE: Primary | ICD-10-CM

## 2024-04-22 DIAGNOSIS — R55 SYNCOPE, UNSPECIFIED SYNCOPE TYPE: ICD-10-CM

## 2024-04-22 DIAGNOSIS — R42 POSTURAL DIZZINESS: ICD-10-CM

## 2024-04-22 PROCEDURE — 70546 MR ANGIOGRAPH HEAD W/O&W/DYE: CPT

## 2024-04-22 PROCEDURE — 70553 MRI BRAIN STEM W/O & W/DYE: CPT

## 2024-04-22 PROCEDURE — A9585 GADOBUTROL INJECTION: HCPCS | Performed by: INTERNAL MEDICINE

## 2024-04-22 PROCEDURE — 255N000002 HC RX 255 OP 636: Performed by: INTERNAL MEDICINE

## 2024-04-22 PROCEDURE — 258N000003 HC RX IP 258 OP 636: Performed by: INTERNAL MEDICINE

## 2024-04-22 RX ORDER — GADOBUTROL 604.72 MG/ML
10 INJECTION INTRAVENOUS ONCE
Status: COMPLETED | OUTPATIENT
Start: 2024-04-22 | End: 2024-04-22

## 2024-04-22 RX ADMIN — GADOBUTROL 10 ML: 604.72 INJECTION INTRAVENOUS at 16:18

## 2024-04-22 RX ADMIN — SODIUM CHLORIDE 50 ML: 9 INJECTION, SOLUTION INTRAVENOUS at 16:28

## 2024-04-23 NOTE — TELEPHONE ENCOUNTER
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      -Small pineal cyst measuring 5 mm    Opal Martinez RN

## 2024-04-26 ENCOUNTER — TELEPHONE (OUTPATIENT)
Dept: NEUROSURGERY | Facility: CLINIC | Age: 19
End: 2024-04-26
Payer: COMMERCIAL

## 2024-04-26 ENCOUNTER — HOSPITAL ENCOUNTER (EMERGENCY)
Facility: CLINIC | Age: 19
Discharge: HOME | End: 2024-04-27
Attending: EMERGENCY MEDICINE | Admitting: EMERGENCY MEDICINE
Payer: COMMERCIAL

## 2024-04-26 DIAGNOSIS — R51.9 NONINTRACTABLE EPISODIC HEADACHE, UNSPECIFIED HEADACHE TYPE: ICD-10-CM

## 2024-04-26 DIAGNOSIS — R82.90 ABNORMAL URINALYSIS: ICD-10-CM

## 2024-04-26 LAB
ALBUMIN SERPL BCG-MCNC: 4.1 G/DL (ref 3.5–5.2)
ALP SERPL-CCNC: 47 U/L (ref 40–150)
ALT SERPL W P-5'-P-CCNC: 18 U/L (ref 0–50)
ANION GAP SERPL CALCULATED.3IONS-SCNC: 12 MMOL/L (ref 7–15)
AST SERPL W P-5'-P-CCNC: 24 U/L (ref 0–35)
BASOPHILS # BLD AUTO: 0 10E3/UL (ref 0–0.2)
BASOPHILS NFR BLD AUTO: 0 %
BILIRUB SERPL-MCNC: 0.3 MG/DL
BUN SERPL-MCNC: 12.2 MG/DL (ref 6–20)
CALCIUM SERPL-MCNC: 8.8 MG/DL (ref 8.6–10)
CHLORIDE SERPL-SCNC: 105 MMOL/L (ref 98–107)
CREAT SERPL-MCNC: 0.62 MG/DL (ref 0.51–0.95)
DEPRECATED HCO3 PLAS-SCNC: 24 MMOL/L (ref 22–29)
EGFRCR SERPLBLD CKD-EPI 2021: >90 ML/MIN/1.73M2
EOSINOPHIL # BLD AUTO: 0.1 10E3/UL (ref 0–0.7)
EOSINOPHIL NFR BLD AUTO: 1 %
ERYTHROCYTE [DISTWIDTH] IN BLOOD BY AUTOMATED COUNT: 11.9 % (ref 10–15)
GLUCOSE SERPL-MCNC: 82 MG/DL (ref 70–99)
HCT VFR BLD AUTO: 38.9 % (ref 35–47)
HGB BLD-MCNC: 13.3 G/DL (ref 11.7–15.7)
IMM GRANULOCYTES # BLD: 0 10E3/UL
IMM GRANULOCYTES NFR BLD: 0 %
LYMPHOCYTES # BLD AUTO: 1.8 10E3/UL (ref 0.8–5.3)
LYMPHOCYTES NFR BLD AUTO: 27 %
MCH RBC QN AUTO: 31.2 PG (ref 26.5–33)
MCHC RBC AUTO-ENTMCNC: 34.2 G/DL (ref 31.5–36.5)
MCV RBC AUTO: 91 FL (ref 78–100)
MONOCYTES # BLD AUTO: 0.6 10E3/UL (ref 0–1.3)
MONOCYTES NFR BLD AUTO: 9 %
NEUTROPHILS # BLD AUTO: 4.2 10E3/UL (ref 1.6–8.3)
NEUTROPHILS NFR BLD AUTO: 63 %
NRBC # BLD AUTO: 0 10E3/UL
NRBC BLD AUTO-RTO: 0 /100
PLATELET # BLD AUTO: 234 10E3/UL (ref 150–450)
POTASSIUM SERPL-SCNC: 3.7 MMOL/L (ref 3.4–5.3)
PROT SERPL-MCNC: 7.4 G/DL (ref 6.4–8.3)
RBC # BLD AUTO: 4.26 10E6/UL (ref 3.8–5.2)
SODIUM SERPL-SCNC: 141 MMOL/L (ref 135–145)
WBC # BLD AUTO: 6.7 10E3/UL (ref 4–11)

## 2024-04-26 PROCEDURE — 250N000011 HC RX IP 250 OP 636: Performed by: EMERGENCY MEDICINE

## 2024-04-26 PROCEDURE — 81003 URINALYSIS AUTO W/O SCOPE: CPT | Performed by: EMERGENCY MEDICINE

## 2024-04-26 PROCEDURE — 96374 THER/PROPH/DIAG INJ IV PUSH: CPT

## 2024-04-26 PROCEDURE — 99284 EMERGENCY DEPT VISIT MOD MDM: CPT | Mod: 25

## 2024-04-26 PROCEDURE — 258N000003 HC RX IP 258 OP 636: Performed by: EMERGENCY MEDICINE

## 2024-04-26 PROCEDURE — 82040 ASSAY OF SERUM ALBUMIN: CPT | Performed by: EMERGENCY MEDICINE

## 2024-04-26 PROCEDURE — 84703 CHORIONIC GONADOTROPIN ASSAY: CPT | Performed by: EMERGENCY MEDICINE

## 2024-04-26 PROCEDURE — 80053 COMPREHEN METABOLIC PANEL: CPT | Performed by: EMERGENCY MEDICINE

## 2024-04-26 PROCEDURE — 96375 TX/PRO/DX INJ NEW DRUG ADDON: CPT

## 2024-04-26 PROCEDURE — 87086 URINE CULTURE/COLONY COUNT: CPT | Performed by: EMERGENCY MEDICINE

## 2024-04-26 PROCEDURE — 36415 COLL VENOUS BLD VENIPUNCTURE: CPT | Performed by: EMERGENCY MEDICINE

## 2024-04-26 PROCEDURE — 85025 COMPLETE CBC W/AUTO DIFF WBC: CPT | Performed by: EMERGENCY MEDICINE

## 2024-04-26 PROCEDURE — 96361 HYDRATE IV INFUSION ADD-ON: CPT

## 2024-04-26 RX ORDER — ONDANSETRON 4 MG/1
4 TABLET, ORALLY DISINTEGRATING ORAL ONCE
Status: COMPLETED | OUTPATIENT
Start: 2024-04-26 | End: 2024-04-26

## 2024-04-26 RX ORDER — DEXAMETHASONE SODIUM PHOSPHATE 10 MG/ML
10 INJECTION, SOLUTION INTRAMUSCULAR; INTRAVENOUS ONCE
Status: COMPLETED | OUTPATIENT
Start: 2024-04-26 | End: 2024-04-26

## 2024-04-26 RX ORDER — DIPHENHYDRAMINE HYDROCHLORIDE 50 MG/ML
25 INJECTION INTRAMUSCULAR; INTRAVENOUS ONCE
Status: COMPLETED | OUTPATIENT
Start: 2024-04-26 | End: 2024-04-26

## 2024-04-26 RX ADMIN — SODIUM CHLORIDE 1000 ML: 9 INJECTION, SOLUTION INTRAVENOUS at 23:57

## 2024-04-26 RX ADMIN — PROCHLORPERAZINE EDISYLATE 10 MG: 5 INJECTION INTRAMUSCULAR; INTRAVENOUS at 23:45

## 2024-04-26 RX ADMIN — ONDANSETRON 4 MG: 4 TABLET, ORALLY DISINTEGRATING ORAL at 21:32

## 2024-04-26 RX ADMIN — DIPHENHYDRAMINE HYDROCHLORIDE 25 MG: 50 INJECTION, SOLUTION INTRAMUSCULAR; INTRAVENOUS at 23:44

## 2024-04-26 RX ADMIN — DEXAMETHASONE SODIUM PHOSPHATE 10 MG: 10 INJECTION, SOLUTION INTRAMUSCULAR; INTRAVENOUS at 23:52

## 2024-04-26 ASSESSMENT — COLUMBIA-SUICIDE SEVERITY RATING SCALE - C-SSRS
1. IN THE PAST MONTH, HAVE YOU WISHED YOU WERE DEAD OR WISHED YOU COULD GO TO SLEEP AND NOT WAKE UP?: NO
2. HAVE YOU ACTUALLY HAD ANY THOUGHTS OF KILLING YOURSELF IN THE PAST MONTH?: NO
6. HAVE YOU EVER DONE ANYTHING, STARTED TO DO ANYTHING, OR PREPARED TO DO ANYTHING TO END YOUR LIFE?: NO

## 2024-04-26 ASSESSMENT — ACTIVITIES OF DAILY LIVING (ADL)
ADLS_ACUITY_SCORE: 33
ADLS_ACUITY_SCORE: 33

## 2024-04-26 NOTE — TELEPHONE ENCOUNTER
Called patient to let her know that the 10th of May is the soonest that we have for Dr. Willson's schedule via Patient call list. -KB

## 2024-04-26 NOTE — TELEPHONE ENCOUNTER
SPINE PATIENTS - NEW PROTOCOL PREVISIT    RECORDS RECEIVED FROM: internal   REASON FOR VISIT: Brain Tumor/Mass-large arachnoid cyst, headaches, syncope    PROVIDER: Dr. Citlali Willson   DATE OF APPT: 5/10/24   NOTES (FOR ALL VISITS) STATUS DETAILS   OFFICE NOTE from referring provider Internal Dr Maria Elena Carpenter @ [facility]:  4/22/24 mychart encounter  4/15/24   DISCHARGE REPORT from ER Internal [facility]:  4/3/24  3/24/24   MEDICATION LIST Internal    IMAGING  (FOR ALL VISITS)     MRI (HEAD, NECK, SPINE) Internal [facility]:  MRV Brain 4/22/24  MRI Brain 4/22/24

## 2024-04-27 VITALS
SYSTOLIC BLOOD PRESSURE: 109 MMHG | DIASTOLIC BLOOD PRESSURE: 66 MMHG | OXYGEN SATURATION: 100 % | TEMPERATURE: 98.6 F | HEIGHT: 63 IN | HEART RATE: 84 BPM | BODY MASS INDEX: 19.67 KG/M2 | WEIGHT: 111 LBS | RESPIRATION RATE: 16 BRPM

## 2024-04-27 LAB
ALBUMIN UR-MCNC: NEGATIVE MG/DL
APPEARANCE UR: CLEAR
BACTERIA #/AREA URNS HPF: ABNORMAL /HPF
BILIRUB UR QL STRIP: NEGATIVE
COLOR UR AUTO: ABNORMAL
GLUCOSE UR STRIP-MCNC: NEGATIVE MG/DL
HCG SERPL QL: NEGATIVE
HGB UR QL STRIP: ABNORMAL
KETONES UR STRIP-MCNC: NEGATIVE MG/DL
LEUKOCYTE ESTERASE UR QL STRIP: ABNORMAL
MUCOUS THREADS #/AREA URNS LPF: PRESENT /LPF
NITRATE UR QL: NEGATIVE
PH UR STRIP: 7 [PH] (ref 5–7)
RBC URINE: 2 /HPF
SP GR UR STRIP: 1.02 (ref 1–1.03)
SQUAMOUS EPITHELIAL: 4 /HPF
UROBILINOGEN UR STRIP-MCNC: NORMAL MG/DL
WBC URINE: 21 /HPF

## 2024-04-27 RX ORDER — METOCLOPRAMIDE 10 MG/1
10 TABLET ORAL 3 TIMES DAILY PRN
Qty: 15 TABLET | Refills: 0 | Status: SHIPPED | OUTPATIENT
Start: 2024-04-27 | End: 2024-05-05

## 2024-04-27 NOTE — ED PROVIDER NOTES
"  History     Chief Complaint:  Headache     The history is provided by the patient.      Aniyah Curry is a 18 year old female presenting with headache. She endorses light sensitivity. Patient underwent an MRI on 4/22/24 where she was diagnosed with arachnoid cyst. The headaches have been present daily for couple months. A couple nights ago, she played tennis and notes increased pressure. She has been evaluated by her PCP for her symptoms. She was prescribed oxycodone, but never filled the prescription. Recently, the headache has started to wrap around her head into the back. Her symptoms have also become more severe. Her headache is worse at night. Her symptoms do not worsen with her menstrual cycle. She has been treating her symptoms with Tylenol and motrin with little relief. Jeny also reports back pain that is usually localized, but has wrapped around to the abdomen in the past. Denies nausea, vomiting, diarrhea, pain with urination, frequency, or abnormal vaginal discharge. Patient is undergoing a POTS workup with a cardiologist, and notes an upcoming neurology appointment. Patient notes a history of presenting to the ED with similar symptoms.     Independent Historian:   Patient's mother present at bedside and corroborates the above.     Review of External Notes:   None     Medications:    Omeprazole     Past Medical History:    Congenital hip deformity     Past Surgical History:    Appendectomy   Orthopedic surgery, tear repair      Physical Exam   Patient Vitals for the past 24 hrs:   BP Temp Pulse Resp SpO2 Height Weight   04/26/24 2130 -- -- -- -- -- 1.6 m (5' 3\") --   04/26/24 2127 120/81 98.6  F (37  C) 84 18 99 % -- 50.3 kg (111 lb)      Physical Exam    HEENT:   External ears are normal.     Temporal arteries are non-tender.      Oropharynx is moist, without lesions or trismus.  Eyes:   PERRL.  EOMs intact.      No corneal clouding.   NECK:   Supple, no meningismus.       Negative Brudzinski's " sign.  CV:    Regular rate and rhythm.    No murmurs, rubs or gallops.  PULM:   Clear to auscultation bilateral.      No respiratory distress.      No stridor or wheezing.  ABD:  Soft, non-tender, non-distended.      No rebound or guarding.  MSK:    No tenderness to thoracic or lumbar spine    Tenderness to left lower thoracic paraspinal musculature  LYMPH:  No cervical lymphadenopathy.  NEURO:  A & O x 3    CN II-XII intact, speech is clear with no aphasia.      Finger to nose within normal limits.  No pronator drift.      Strength is 5/5 in all 4 extremities.  Sensation is intact.      Normal muscular tone, no tremor.  SKIN:   Warm, dry and intact.    PSYCH:   Mood is good and affect is appropriate.      Emergency Department Course     Imaging:  No orders to display      Laboratory:  Labs Ordered and Resulted from Time of ED Arrival to Time of ED Departure   ROUTINE UA WITH MICROSCOPIC REFLEX TO CULTURE - Abnormal       Result Value    Color Urine Light Yellow      Appearance Urine Clear      Glucose Urine Negative      Bilirubin Urine Negative      Ketones Urine Negative      Specific Gravity Urine 1.022      Blood Urine Large (*)     pH Urine 7.0      Protein Albumin Urine Negative      Urobilinogen Urine Normal      Nitrite Urine Negative      Leukocyte Esterase Urine Moderate (*)     Bacteria Urine Few (*)     Mucus Urine Present (*)     RBC Urine 2      WBC Urine 21 (*)     Squamous Epithelials Urine 4 (*)    COMPREHENSIVE METABOLIC PANEL - Normal    Sodium 141      Potassium 3.7      Carbon Dioxide (CO2) 24      Anion Gap 12      Urea Nitrogen 12.2      Creatinine 0.62      GFR Estimate >90      Calcium 8.8      Chloride 105      Glucose 82      Alkaline Phosphatase 47      AST 24      ALT 18      Protein Total 7.4      Albumin 4.1      Bilirubin Total 0.3     HCG QUALITATIVE PREGNANCY - Normal    hCG Serum Qualitative Negative     CBC WITH PLATELETS AND DIFFERENTIAL    WBC Count 6.7      RBC Count 4.26       Hemoglobin 13.3      Hematocrit 38.9      MCV 91      MCH 31.2      MCHC 34.2      RDW 11.9      Platelet Count 234      % Neutrophils 63      % Lymphocytes 27      % Monocytes 9      % Eosinophils 1      % Basophils 0      % Immature Granulocytes 0      NRBCs per 100 WBC 0      Absolute Neutrophils 4.2      Absolute Lymphocytes 1.8      Absolute Monocytes 0.6      Absolute Eosinophils 0.1      Absolute Basophils 0.0      Absolute Immature Granulocytes 0.0      Absolute NRBCs 0.0     URINE CULTURE     Emergency Department Course & Assessments:    Interventions:  Medications   sodium chloride 0.9% BOLUS 1,000 mL (1,000 mLs Intravenous $New Bag 24)   ondansetron (ZOFRAN ODT) ODT tab 4 mg (4 mg Oral $Given 24)   prochlorperazine (COMPAZINE) injection 10 mg (10 mg Intravenous $Given 24)   diphenhydrAMINE (BENADRYL) injection 25 mg (25 mg Intravenous $Given 24)   dexAMETHasone PF (DECADRON) injection 10 mg (10 mg Intravenous $Given 24)      Independent Interpretation (X-rays, CTs, rhythm strip):  No imaging completed    Assessments/Consultations/Discussion of Management or Tests:  ED Course as of 24 0043   Fri 2024   2309 I obtained the history and examined the patient as noted above.      Sat 2024   003 I rechecked and updated the patient.       Social Determinants of Health affecting care:   None    Disposition:  The patient was discharged to home.     Impression & Plan    CMS Diagnoses: None    MIPS (If applicable):  N/A    Medical Decision Makin-year-old female presents with recurrence of chronic headache which has been present for the last 6 weeks.  Patient underwent MRI just 4 days prior which revealed arachnoid cyst but otherwise unremarkable.  No indication for repeat advanced imaging.  She has no features concerning for meningitis, temporal arteritis, or idiopathic intracranial hypertension.  Evaluation consistent with recurrent  headache.  It is unclear whether the patient's headache is related to the arachnoid cyst versus migraine.  I have lower suspicion that symptoms are related to arachnoid cyst  but patient has scheduled follow-up with neurosurgery to address the cyst.  Referral to outpatient neurology as she may benefit from a daily preventative medication.  Tylenol, ibuprofen and addition of Reglan as needed for headache/nausea.    Patient had an abnormal urinalysis.  She is not having classic urinary symptoms.  I will not treat with antibiotics unless urine culture returns positive given overall low suspicion for UTI.    Diagnosis:    ICD-10-CM    1. Nonintractable episodic headache, unspecified headache type  R51.9 Adult Neurology  Referral      2. Abnormal urinalysis  R82.90          Discharge Medications:  New Prescriptions    METOCLOPRAMIDE (REGLAN) 10 MG TABLET    Take 1 tablet (10 mg) by mouth 3 times daily as needed (headache or nausea)      Scribe Disclosure:  Sophia CHAIREZ, am serving as a scribe at 10:59 PM on 4/26/2024 to document services personally performed by Lane Galicia MD based on my observations and the provider's statements to me.  4/26/2024   Lane Galicai MD Matthews, Jeremiah R, MD  04/27/24 0046

## 2024-04-27 NOTE — ED TRIAGE NOTES
Arrives from home.  has a MRI on Monday and found cysts on her brain.  has baseline headaches but have gotten worse and feels different than normal.  has had a couple of episode of blurry vision and new back pain.     Denies recent trauma.     Has been utilizing motrin and tylenol without improvement, last @ 1600.

## 2024-04-28 ENCOUNTER — MYC MEDICAL ADVICE (OUTPATIENT)
Dept: PEDIATRICS | Facility: CLINIC | Age: 19
End: 2024-04-28
Payer: COMMERCIAL

## 2024-04-28 LAB — BACTERIA UR CULT: NO GROWTH

## 2024-04-29 NOTE — TELEPHONE ENCOUNTER
"Patient was seen in the ED Friday, 4/26 for a headache in a different area.  She also felt weird and \"floaty\".  Had julita blurred vision.    Got a migraine cocktail and symptoms resolved. Patient was referred to neurology for headaches.    She follows with cardiology and is scheduled for tilt table study on 5/8    Omeprazole was stopped.  Patient was prescribed Reglan for nausea and/or headaches, which she has not started taking yet.    She is having more abdominal pain on the left side, mostly after eating.  Nausea and decreased appetite continue.    She has an appointment with a neurosurgeon on 5/7.    Patient has brain MRI on 4/22:   FINDINGS: ...   Impression   IMPRESSION:  1.  No acute intracranial process.  2.  Retrocerebellar arachnoid cyst described above.  3.  Small pineal cyst.  4.  Otherwise, unremarkable MRI of the brain.     Routing to Dr. Verena RAY.    Opal Martinez RN    "

## 2024-05-05 ENCOUNTER — HOSPITAL ENCOUNTER (EMERGENCY)
Facility: CLINIC | Age: 19
Discharge: HOME | End: 2024-05-06
Attending: EMERGENCY MEDICINE | Admitting: EMERGENCY MEDICINE
Payer: COMMERCIAL

## 2024-05-05 DIAGNOSIS — R07.89 LEFT-SIDED CHEST WALL PAIN: ICD-10-CM

## 2024-05-05 DIAGNOSIS — G89.29 CHRONIC NONINTRACTABLE HEADACHE, UNSPECIFIED HEADACHE TYPE: ICD-10-CM

## 2024-05-05 DIAGNOSIS — R51.9 CHRONIC NONINTRACTABLE HEADACHE, UNSPECIFIED HEADACHE TYPE: ICD-10-CM

## 2024-05-05 PROCEDURE — 99284 EMERGENCY DEPT VISIT MOD MDM: CPT | Mod: 25

## 2024-05-05 RX ORDER — KETOROLAC TROMETHAMINE 15 MG/ML
10 INJECTION, SOLUTION INTRAMUSCULAR; INTRAVENOUS ONCE
Status: COMPLETED | OUTPATIENT
Start: 2024-05-06 | End: 2024-05-06

## 2024-05-05 RX ORDER — DEXAMETHASONE SODIUM PHOSPHATE 10 MG/ML
10 INJECTION, SOLUTION INTRAMUSCULAR; INTRAVENOUS ONCE
Status: COMPLETED | OUTPATIENT
Start: 2024-05-06 | End: 2024-05-06

## 2024-05-06 ENCOUNTER — MYC MEDICAL ADVICE (OUTPATIENT)
Dept: PEDIATRICS | Facility: CLINIC | Age: 19
End: 2024-05-06

## 2024-05-06 VITALS
WEIGHT: 109.13 LBS | BODY MASS INDEX: 19.34 KG/M2 | SYSTOLIC BLOOD PRESSURE: 123 MMHG | TEMPERATURE: 99.5 F | DIASTOLIC BLOOD PRESSURE: 73 MMHG | OXYGEN SATURATION: 98 % | HEART RATE: 65 BPM | RESPIRATION RATE: 18 BRPM | HEIGHT: 63 IN

## 2024-05-06 LAB
ALBUMIN SERPL BCG-MCNC: 4 G/DL (ref 3.5–5.2)
ALP SERPL-CCNC: 43 U/L (ref 40–150)
ALT SERPL W P-5'-P-CCNC: 15 U/L (ref 0–50)
ANION GAP SERPL CALCULATED.3IONS-SCNC: 9 MMOL/L (ref 7–15)
AST SERPL W P-5'-P-CCNC: 19 U/L (ref 0–35)
BASOPHILS # BLD AUTO: 0.1 10E3/UL (ref 0–0.2)
BASOPHILS NFR BLD AUTO: 1 %
BILIRUB SERPL-MCNC: 0.3 MG/DL
BUN SERPL-MCNC: 8 MG/DL (ref 6–20)
CALCIUM SERPL-MCNC: 9.1 MG/DL (ref 8.6–10)
CHLORIDE SERPL-SCNC: 105 MMOL/L (ref 98–107)
CREAT SERPL-MCNC: 0.75 MG/DL (ref 0.51–0.95)
DEPRECATED HCO3 PLAS-SCNC: 25 MMOL/L (ref 22–29)
EGFRCR SERPLBLD CKD-EPI 2021: >90 ML/MIN/1.73M2
EOSINOPHIL # BLD AUTO: 0.1 10E3/UL (ref 0–0.7)
EOSINOPHIL NFR BLD AUTO: 1 %
ERYTHROCYTE [DISTWIDTH] IN BLOOD BY AUTOMATED COUNT: 11.9 % (ref 10–15)
GLUCOSE SERPL-MCNC: 101 MG/DL (ref 70–99)
HCT VFR BLD AUTO: 36.8 % (ref 35–47)
HGB BLD-MCNC: 12.6 G/DL (ref 11.7–15.7)
IMM GRANULOCYTES # BLD: 0 10E3/UL
IMM GRANULOCYTES NFR BLD: 0 %
LIPASE SERPL-CCNC: 28 U/L (ref 13–60)
LYMPHOCYTES # BLD AUTO: 3.4 10E3/UL (ref 0.8–5.3)
LYMPHOCYTES NFR BLD AUTO: 49 %
MCH RBC QN AUTO: 30.7 PG (ref 26.5–33)
MCHC RBC AUTO-ENTMCNC: 34.2 G/DL (ref 31.5–36.5)
MCV RBC AUTO: 90 FL (ref 78–100)
MONOCYTES # BLD AUTO: 0.6 10E3/UL (ref 0–1.3)
MONOCYTES NFR BLD AUTO: 9 %
NEUTROPHILS # BLD AUTO: 2.8 10E3/UL (ref 1.6–8.3)
NEUTROPHILS NFR BLD AUTO: 40 %
NRBC # BLD AUTO: 0 10E3/UL
NRBC BLD AUTO-RTO: 0 /100
PLATELET # BLD AUTO: 243 10E3/UL (ref 150–450)
POTASSIUM SERPL-SCNC: 3.8 MMOL/L (ref 3.4–5.3)
PROT SERPL-MCNC: 7 G/DL (ref 6.4–8.3)
RBC # BLD AUTO: 4.1 10E6/UL (ref 3.8–5.2)
SODIUM SERPL-SCNC: 139 MMOL/L (ref 135–145)
WBC # BLD AUTO: 6.9 10E3/UL (ref 4–11)

## 2024-05-06 PROCEDURE — 96374 THER/PROPH/DIAG INJ IV PUSH: CPT

## 2024-05-06 PROCEDURE — 96375 TX/PRO/DX INJ NEW DRUG ADDON: CPT

## 2024-05-06 PROCEDURE — 258N000003 HC RX IP 258 OP 636: Performed by: EMERGENCY MEDICINE

## 2024-05-06 PROCEDURE — 36415 COLL VENOUS BLD VENIPUNCTURE: CPT | Performed by: EMERGENCY MEDICINE

## 2024-05-06 PROCEDURE — 83690 ASSAY OF LIPASE: CPT | Performed by: EMERGENCY MEDICINE

## 2024-05-06 PROCEDURE — 96361 HYDRATE IV INFUSION ADD-ON: CPT

## 2024-05-06 PROCEDURE — 80053 COMPREHEN METABOLIC PANEL: CPT | Performed by: EMERGENCY MEDICINE

## 2024-05-06 PROCEDURE — 250N000011 HC RX IP 250 OP 636: Performed by: EMERGENCY MEDICINE

## 2024-05-06 PROCEDURE — 85025 COMPLETE CBC W/AUTO DIFF WBC: CPT | Performed by: EMERGENCY MEDICINE

## 2024-05-06 RX ADMIN — KETOROLAC TROMETHAMINE 10 MG: 15 INJECTION, SOLUTION INTRAMUSCULAR; INTRAVENOUS at 00:09

## 2024-05-06 RX ADMIN — SODIUM CHLORIDE 1000 ML: 9 INJECTION, SOLUTION INTRAVENOUS at 00:10

## 2024-05-06 RX ADMIN — DEXAMETHASONE SODIUM PHOSPHATE 10 MG: 10 INJECTION, SOLUTION INTRAMUSCULAR; INTRAVENOUS at 00:06

## 2024-05-06 ASSESSMENT — ACTIVITIES OF DAILY LIVING (ADL): ADLS_ACUITY_SCORE: 35

## 2024-05-06 NOTE — TELEPHONE ENCOUNTER
"See patient's [employer] message   - Patient states that she was seen in the [facility] ED last night (5/5/2024) for Headache, Eye Problem, and Abdominal Pain   - Patient wondering if she should see a GI doctor as the left sided abdominal pain and back pain is worsening     Upon chart review:   - No notation of GI referral in the ED note from 5/5/2024   - \"Follow-Ups: Follow up with Maria Elena Albarado MD (Internal Medicine)\"   - No imaging completed at 5/5/2024 ED visit     Dr. Albarado, please review and advise.   - Do you recommend that this be discussed in an ED follow-up visit?     Teena GARCIA RN   [facility]   "

## 2024-05-06 NOTE — DISCHARGE INSTRUCTIONS
You were seen today for several concerns.  Your headaches have been chronic and recurrent.  Your MRI was reviewed recently and agree with the plan for outpatient neurosurgical and neurology follow-up.  Given the visual symptoms you are having I suspect these are migraines with visual aura which can be discussed in more detail with your neurologist.  I am not concerned for meningitis, brain tumor, bleed or other life-threatening causes of headache.  In regards to your abdominal pain this localizes to your left cartilage margin of your chest wall.  I have reviewed your recent CT scans and do not feel that repeat imaging is needed.  Your labs are reassuring.  No findings on your skin concerning for infection or shingles.  Primary care follow-up is reasonable.

## 2024-05-06 NOTE — ED TRIAGE NOTES
Presents to triage with c/o worsening headaches and abdominal pain. Patient has known cysts that were found on brain imaging and has surgical removal next week. Was told to come to ED for worsening headaches or vision changes. Patient has been having intermittent black spots in her vision today. Patient also c/ro LUQ abdominal pain that wraps around to L back. Patient states this has been ongoing for a few weeks but is worsening.

## 2024-05-06 NOTE — ED PROVIDER NOTES
"  Emergency Department Note      History of Present Illness     Chief Complaint  Headache, Eye Problem, and Abdominal Pain    HPI  Aniyah Curry is a 18 year old female with a history of retrocerebellar arachnoid cyst diagnosed by MRI on 4/22 for a headache workup who presents to the emergency department for headache, eye problem, and abdominal pain. The patient states that tonight, she has been experiencing worsening headaches, vision changes, left upper quadrant abdominal pain radiating to her back, and abdominal \"swelling.\" She describes these vision changes as \"black spots\" in her vision. She adds that when she experiences headaches, she typically experiences \"tunnel vision.\" She notes that she recently had an MRI that revealed a retrocerebellar arachnoid cyst that she is discussing with a neurologist and neurosurgery in 2 days. She notes that she also recently was diagnosed with costochondritis and is currently being worked up for a potential POTS diagnosis with the scheduled tilt table test upcoming. She adds that she recently had 2 separate CT's of her abdomen and pelvis that was unremarkable. Denies known hx of migraines. Denies fever, vomiting, or diarrhea. Denies neck pain. Denies recent falls or trauma. Denies difficulty urinating. Denies abnormal vaginal discharge or vaginal bleeding.     Independent Historian  The patient as noted above.    Review of External Notes  I reviewed the patient's MRI from 04/22/24. Impression noted below:     1.  No acute intracranial process.  2.  Retrocerebellar arachnoid cyst described above.  3.  Small pineal cyst.  4.  Otherwise, unremarkable MRI of the brain.    I reviewed the patient's emergency department note from 04/26/24. The patient was treated for a migraine headache.    I reviewed the patient's CT abdomen/pelvis from 04/03/24. Impression noted below:    1.  No acute findings in the abdomen and pelvis.    Past Medical History   Medical History and Problem " "List  Past Medical History:   Diagnosis Date    RetroCerebellar arachnoid cyst     Seasonal allergic rhinitis      Medications  omeprazole (PRILOSEC) 20 MG DR capsule      Surgical History   Past Surgical History:   Procedure Laterality Date    APPENDECTOMY      Labral tear repair Left 01/2022     Physical Exam   Patient Vitals for the past 24 hrs:   BP Temp Temp src Pulse Resp SpO2 Height Weight   05/06/24 0120 123/73 -- -- 65 -- 98 % -- --   05/06/24 0020 118/86 -- -- 67 -- 99 % -- --   05/06/24 0015 120/80 -- -- 66 -- 98 % -- --   05/05/24 2309 137/89 99.5  F (37.5  C) Temporal 72 18 98 % 1.6 m (5' 3\") 49.5 kg (109 lb 2 oz)     Physical Exam  Nursing note and vitals reviewed.  Constitutional: Cooperative.  Sitting up comfortably  HENT:   Mouth/Throat: Mucous membranes are normal.   Freely moving neck without rigidity  Eyes: Pupils are equal, round, and reactive to light.   Cardiovascular: Normal rate, regular rhythm and normal heart sounds.  No murmur.  Pulmonary/Chest: Tenderness over the left inferior costovertebral margin. Effort normal and breath sounds normal. No respiratory distress. No wheezes. No rales.   Abdominal: Soft. Normal appearance. No distension. There is no tenderness. There is no rigidity and no guarding.   Musculoskeletal: Normal range of motion.   Neurological: Alert.  Oriented x 3.  GCS 15  Skin: Skin is warm and dry. No rash noted to left chest or abdomen.   Psychiatric: Normal mood and affect.     Diagnostics   Lab Results   Labs Ordered and Resulted from Time of ED Arrival to Time of ED Departure   COMPREHENSIVE METABOLIC PANEL - Abnormal       Result Value    Sodium 139      Potassium 3.8      Carbon Dioxide (CO2) 25      Anion Gap 9      Urea Nitrogen 8.0      Creatinine 0.75      GFR Estimate >90      Calcium 9.1      Chloride 105      Glucose 101 (*)     Alkaline Phosphatase 43      AST 19      ALT 15      Protein Total 7.0      Albumin 4.0      Bilirubin Total 0.3     LIPASE - Normal "    Lipase 28     CBC WITH PLATELETS AND DIFFERENTIAL    WBC Count 6.9      RBC Count 4.10      Hemoglobin 12.6      Hematocrit 36.8      MCV 90      MCH 30.7      MCHC 34.2      RDW 11.9      Platelet Count 243      % Neutrophils 40      % Lymphocytes 49      % Monocytes 9      % Eosinophils 1      % Basophils 1      % Immature Granulocytes 0      NRBCs per 100 WBC 0      Absolute Neutrophils 2.8      Absolute Lymphocytes 3.4      Absolute Monocytes 0.6      Absolute Eosinophils 0.1      Absolute Basophils 0.1      Absolute Immature Granulocytes 0.0      Absolute NRBCs 0.0       Imaging  None    ED Course    Medications Administered  Medications   sodium chloride 0.9% BOLUS 1,000 mL (0 mLs Intravenous Stopped 5/6/24 0135)   ketorolac (TORADOL) injection 10 mg (10 mg Intravenous $Given 5/6/24 0009)   dexAMETHasone PF (DECADRON) injection 10 mg (10 mg Intravenous $Given 5/6/24 0006)     Discussion of Management  None    Social Determinants of Health adding to complexity of care  None    ED Course  ED Course as of 05/06/24 0205   Sun May 05, 2024   2347 I obtained history and examined the patient as noted above.      Mon May 06, 2024   0125 I rechecked the patient. I discussed findings and discharge with the patient. All questions answered.        Medical Decision Making / Diagnosis     MEDARDO Curry is a 18 year old female who presents with ongoing symptoms.  These have been evaluated fairly extensively already.  In regards to her headache I suspect migraine syndrome.  She has intermittent visual tunneling as well as black spots in her vision.  This is classic for migraines with visual aura.  MRI was reviewed showing what I believed to be all incidental retrocerebellar arachnoid cyst.  She has follow-up with neurosurgery and neurology in this regard.  No fevers or neck rigidity.  No concern for intracranial hemorrhage or mass lesion.  I do not feel that lumbar puncture for spinal fluid analysis or repeat head  imaging is needed.  In regards to what she describes abdominal pain this localizes very clearly to her left inferior costal margin of her chest wall.  I agree with previous diagnosis of costochondritis or chest wall inflammation.  No skin rash or concern for shingles or cutaneous infection.  Her abdominal exam is normal.  She has had 2 CTs of the abdomen in the last month both of which were unremarkable.  Further CT imaging or radiation is not necessary.  She could consider H. pylori testing as an outpatient with primary care though again with reproducible tenderness to the chest wall I think this is myofascial in etiology.    Disposition  The patient was discharged.     ICD-10 Codes:    ICD-10-CM    1. Chronic nonintractable headache, unspecified headache type  R51.9     G89.29       2. Left-sided chest wall pain  R07.89          Scribe Disclosure:  CONTRERAS, Chaz Rodriguez, am serving as a scribe at 12:06 AM on 5/6/2024 to document services personally performed by Inocencio Yeung MD based on my observations and the provider's statements to me.     [employer]      Inocencio Yeung MD  05/06/24 6231

## 2024-05-07 ENCOUNTER — OFFICE VISIT (OUTPATIENT)
Dept: NEUROSURGERY | Facility: CLINIC | Age: 19
End: 2024-05-07
Attending: INTERNAL MEDICINE
Payer: COMMERCIAL

## 2024-05-07 VITALS
SYSTOLIC BLOOD PRESSURE: 106 MMHG | OXYGEN SATURATION: 98 % | HEART RATE: 96 BPM | HEIGHT: 63 IN | WEIGHT: 110.7 LBS | DIASTOLIC BLOOD PRESSURE: 71 MMHG | BODY MASS INDEX: 19.61 KG/M2

## 2024-05-07 DIAGNOSIS — G44.52 NEW DAILY PERSISTENT HEADACHE: ICD-10-CM

## 2024-05-07 DIAGNOSIS — R42 POSTURAL DIZZINESS: ICD-10-CM

## 2024-05-07 DIAGNOSIS — R55 SYNCOPE, UNSPECIFIED SYNCOPE TYPE: ICD-10-CM

## 2024-05-07 DIAGNOSIS — G93.0 INTRACRANIAL ARACHNOID CYST: Primary | ICD-10-CM

## 2024-05-07 PROCEDURE — 99203 OFFICE O/P NEW LOW 30 MIN: CPT | Mod: GC | Performed by: NEUROLOGICAL SURGERY

## 2024-05-07 RX ORDER — METOCLOPRAMIDE 5 MG/1
5 TABLET ORAL PRN
COMMUNITY
End: 2024-07-31

## 2024-05-07 ASSESSMENT — PAIN SCALES - GENERAL: PAINLEVEL: SEVERE PAIN (7)

## 2024-05-07 NOTE — LETTER
"5/7/2024       RE: Aniyah Curry  [address] [facility] [address]       Dear Colleague,    Thank you for referring your patient, Aniyah Curry, to the [facility]UROS[facility]IC [place] at [facility]. Please see a copy of my visit note below.    5/7/2024  Neurosurgery Clinic Visit    Chief Complaint: Chronic headaches    History of present illness:  Aniyah Curry is a 18 year old female presenting no notable past medical history presenting for further evaluation of a retrocerebellar arachnoid cyst diagnosed on 4/22/2024 during a headache workup.  Notably, the patient was recently in the emergency department (5/5/2024 or significant headaches, vision changes describing black spots worsening with headaches).  During this appointment, she also had significant abdominal pain.     Today the patient reports that she has ongoing headaches, including today. She has also dropped out of school due to difficulty concentrating. There's concern for POTS (postural orthostatic tachycardia syndrome). She also intermittently has tremors.     Physical exam:   /71 (BP Location: Right arm, Patient Position: Sitting, Cuff Size: Adult Regular)   Pulse 96   Ht 1.6 m (5' 3\")   Wt 50.2 kg (110 lb 11.2 oz)   LMP 04/10/2024 (Exact Date)   SpO2 98%   BMI 19.61 kg/m    General: Awake and alert and in no acute distress.  Pulm: Breathing comfortably on room air  Neurologic:  - Face symmetric  - Pupils reactive, extraocular movements intact  - Full strength in upper and lower extremities  - Intact sensation to all extremities  - No clonus, patellar and bicep reflexes 2+  - Intact coordination    Imaging:  All previous imaging pertinent to this encounter reviewed.  Pertinent reads:  Recent MRI is reviewed on 4/22/2024.  There is evidence of a retrocerebellar arachnoid cyst with minimal mass effect in the vermis measuring 1.4 x 1.3 x 3.5 cm.  Additionally " there is a small pineal cyst measuring 5 mm.  A venogram was performed to assess impact on sinuses, demonstrating no sinus thrombosis or occlusion.    Assessment/Plan:  Aniyah Curry is a 18 year old female presenting for evaluation of an arachnoid cyst in the retrocerebellar space observed after ongoing headaches. Unfortunately, this is a benign finding and at this time, does not explain the symptoms the patient has. The patient has an intact neurological exam currently.  - Agree with follow-up with neurology for evaluation of headaches  - Follow-up in 1 year with repeat MRI at that time to assess interval changes.    Patient seen and discussed with Dr. Citlali Willson MD.  Approximately 45 minutes were spent in chart and image review, evaluation of the patient and assessment of next steps.    Melvin Umaña MD  PGY-1, Department of Neurosurgery  [facility]          REVIEWED ASSOCIATED CLINICAL DATA AND HISTORY FOUND IN THE MEDICAL RECORD:  Past Medical History:   Past Medical History:   Diagnosis Date    RetroCerebellar arachnoid cyst     Seasonal allergic rhinitis        Surgical History:   Past Surgical History:   Procedure Laterality Date    APPENDECTOMY      Labral tear repair Left 01/2022       Social history:   Social History     Tobacco Use    Smoking status: Never     Passive exposure: Never    Smokeless tobacco: Never   Vaping Use    Vaping status: Never Used   Substance Use Topics    Alcohol use: No    Drug use: No       Family history:   Family History   Problem Relation Age of Onset    Hypothyroidism Mother     Unknown/Adopted Mother     Anesthesia Reaction Mother     Thyroid Disease Mother         Hashimoto's    Hypothyroidism Father     Thyroid Disease Father         Hypothyroidism and vitiligo    Hypothyroidism Maternal Grandmother     Thyroid Disease Maternal Grandmother         Hypothyroidism    Cerebrovascular Disease Maternal Grandfather         2014    Prostate Cancer  Maternal Grandfather     Hypothyroidism Paternal Grandmother     Thyroid Disease Paternal Grandmother         Hyposthyroidism    Gastrointestinal Disease Brother 5        celiac disease    Hypothyroidism Brother     Coronary Artery Disease Paternal Grandfather     Hypertension Paternal Grandfather     Anxiety Disorder Paternal Grandfather     Breast Cancer Other         Aunt    Asthma Brother     Genetic Disorder Brother         Celiac Disease    Thyroid Disease Brother         Autoimmune Hypothyroidism    Genetic Disorder Cousin         JRA       Medications:  Current Outpatient Medications   Medication Sig Dispense Refill    metoclopramide (REGLAN) 5 MG tablet Take 5 mg by mouth as needed      omeprazole (PRILOSEC) 20 MG DR capsule Take 1 capsule (20 mg) by mouth daily 30 capsule 0     No current facility-administered medications for this visit.       Allergies:     Allergies   Allergen Reactions    No Known Drug Allergy      I have seen this patient with the resident and formulated a plan and agree with this note.  AMP        Again, thank you for allowing me to participate in the care of your patient.      Sincerely,    Citlali Willson MD

## 2024-05-08 ENCOUNTER — HOSPITAL ENCOUNTER (OUTPATIENT)
Facility: CLINIC | Age: 19
Discharge: HOME | End: 2024-05-08
Attending: INTERNAL MEDICINE | Admitting: INTERNAL MEDICINE
Payer: COMMERCIAL

## 2024-05-08 ENCOUNTER — APPOINTMENT (OUTPATIENT)
Dept: MEDSURG UNIT | Facility: CLINIC | Age: 19
End: 2024-05-08
Attending: INTERNAL MEDICINE
Payer: COMMERCIAL

## 2024-05-08 VITALS
SYSTOLIC BLOOD PRESSURE: 118 MMHG | WEIGHT: 110.01 LBS | OXYGEN SATURATION: 100 % | BODY MASS INDEX: 19.49 KG/M2 | TEMPERATURE: 98.4 F | RESPIRATION RATE: 16 BRPM | HEART RATE: 66 BPM | DIASTOLIC BLOOD PRESSURE: 70 MMHG

## 2024-05-08 DIAGNOSIS — R42 POSTURAL DIZZINESS: ICD-10-CM

## 2024-05-08 DIAGNOSIS — R07.89 OTHER CHEST PAIN: ICD-10-CM

## 2024-05-08 DIAGNOSIS — R55 SYNCOPE, UNSPECIFIED SYNCOPE TYPE: ICD-10-CM

## 2024-05-08 DIAGNOSIS — I47.29 PAROXYSMAL VENTRICULAR TACHYCARDIA (H): ICD-10-CM

## 2024-05-08 DIAGNOSIS — R55 SYNCOPE AND COLLAPSE: ICD-10-CM

## 2024-05-08 PROCEDURE — 272N000001 HC OR GENERAL SUPPLY STERILE: Performed by: INTERNAL MEDICINE

## 2024-05-08 PROCEDURE — 93660 TILT TABLE EVALUATION: CPT | Mod: 26 | Performed by: INTERNAL MEDICINE

## 2024-05-08 PROCEDURE — 258N000003 HC RX IP 258 OP 636: Performed by: INTERNAL MEDICINE

## 2024-05-08 PROCEDURE — 93660 TILT TABLE EVALUATION: CPT | Performed by: INTERNAL MEDICINE

## 2024-05-08 PROCEDURE — 999N000142 HC STATISTIC PROCEDURE PREP ONLY

## 2024-05-08 PROCEDURE — 95921 AUTONOMIC NRV PARASYM INERVJ: CPT | Mod: 26 | Performed by: INTERNAL MEDICINE

## 2024-05-08 PROCEDURE — 95921 AUTONOMIC NRV PARASYM INERVJ: CPT | Performed by: INTERNAL MEDICINE

## 2024-05-08 PROCEDURE — 999N000132 HC STATISTIC PP CARE STAGE 1

## 2024-05-08 RX ORDER — LIDOCAINE 40 MG/G
CREAM TOPICAL
Status: DISCONTINUED | OUTPATIENT
Start: 2024-05-08 | End: 2024-05-08 | Stop reason: HOSPADM

## 2024-05-08 RX ORDER — SODIUM CHLORIDE 9 MG/ML
INJECTION, SOLUTION INTRAVENOUS CONTINUOUS
Status: DISCONTINUED | OUTPATIENT
Start: 2024-05-08 | End: 2024-05-08 | Stop reason: HOSPADM

## 2024-05-08 RX ADMIN — SODIUM CHLORIDE 600 ML: 9 INJECTION, SOLUTION INTRAVENOUS at 07:49

## 2024-05-08 ASSESSMENT — ACTIVITIES OF DAILY LIVING (ADL)
ADLS_ACUITY_SCORE: 35

## 2024-05-08 NOTE — PROGRESS NOTES
Discharge instructions reviewed with patient. Dr. Jiménez's recommendations and standing exercises explained to patient and parents. No further questions.  PIV removed.  Patient to discharge home with parents.

## 2024-05-08 NOTE — DISCHARGE INSTRUCTIONS
[facility]  DISCHARGE INSTRUCTIONS FOR   TILT TABLE WITH  VASOVAGAL SYNCOPE    Date:May 8, 2024    Plan:  Your tilt table showed you have vasovagal syncope.  - Increase hydration with goal to take in 50 oz low carb electrolyte fluids per day.     - Liberalize salt intake.  - Change positions carefully and slowly and maintain safe environment.  - Standing training and supine isometric exercises.    Cardiovascular Clinic:  [address] [place] [address], [address]    Your Care Team:  EP Cardiology   Telephone Number     Nurses:   Concepcion RYAN [phone]    After business hours: [phone], select option 4, and ask for EP Fellow on-call to be paged.   Non-procedure scheduling:  Laura CALI   [phone]   Procedure scheduling:    Denise Francis   [phone]   Device Clinic (Pacemakers, ICDs, Loop Recorders)    During business hours: [phone]  After business hours:   [phone]- select option 4 and ask for job code [phone].       As always, Thank you for trusting us with your health care needs

## 2024-05-08 NOTE — PROGRESS NOTES
Pt prepped for tilt table study. PIV infusing fluids per MAR. Consent completed. Parents at bedside.

## 2024-05-08 NOTE — PROGRESS NOTES
Patient ambulating back to 2A from tilt table. IV bolus infusing. VSS upon return. She denies dizziness/nausea. Offered food and drink but patient declined. Awaiting Dr. Jiménez to speak with patient and parents at bedside.

## 2024-05-08 NOTE — PROCEDURES
EP PROCEDURE NOTE    *Procedures:*    1. TILT table test.  2. Autonomic function test.     *Cardiologist:*  Issac Jiménez    *EP Fellow:*  Dr Leonardo Mercedes    *Referring MD: *  Dr Geovanna Monet ([facility])    *Pre-operative Diagnosis:*  Syncope.    *Complications:*  None.     *Medications:*  /None.     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 Monet for assessment of recurrent near faints and faints 1 of which was associated with minor injury.     Jeny indicates that she has had issues with heart rate for several years a However the problem seemed to come to be more serious by the end of October 2023 when she had a true syncopal episode without warning while sitting on a barstool.  She apparently fell and hit her chin on the bar requiring 3 stitches.  She had no recollection of any warning.  It was at about dinnertime at a restaurant.     Other episodes of syncope or near syncope in Karmen's case fall into 2 general categories.     1.  The most straightforward is orthostatic in nature with the patient recognizing that when she stands up her vision may go dark and she has to stand still and hold on for some seconds   2.  The more difficult to distinguish symptoms has necessitated going to the ED on a number of occasions.  Some of these are associated with sharp left-sided submammary chest pain which by history and by reports is almost certainly noncardiac musculoskeletal pain.  History suggests that the pain can be worsened with deep breathing and improved with pressing on the spot.     The patient is here for autonomic testing including tilt testing.     Please see [facility] clinic visit note for details.      PROCEDURE    The risks and benefits of the procedure were explained to the patient in   full. Written informed consent was obtained. The patient was brought to the   EP lab in a fasting and hemodynamically stable condition. Physical    examination of the patient did not reveal any carotid bruits, and review of   the chart did not reveal the presence of stroke or TIA within the past   three months.     The following maneuvers were done sequentially:    1- Sitting for 5 minutes:   End of 5 min:  HR 72, /75    2- Standing for 10 minutes:  Baseline HR 76 /74  Immediate Roberto    BP 96/58     time from standing to roberto 10 seconds      time from roberto to recovery 5 seconds--complained of feeling dizzy then recovered  30 sec: HR N/A, BP N/A  1 min: HR 82, /75  2 min: HR 93, /72  3 min: HR 91, /69  4 min: HR 95, /73  5 min: , /71  6 min: HR 97, /75  7 min: , /70  8 min: HR 95, /73  Portion of study terminated  2- Maneuvers in seated position:    A. Carotid sinus massage:  Not done due to age and gender      B. Valsalva:   Baseline: HR 82, /78  Phase 1: HR 70, Max /82  Phase 2: , Min /101  Phase 3: Present/Absent  Phase 4 (Overshoot): HR 76, Max /104  Valsalva ratio 114/76= 1.5 (within normal range)    Symptoms: None    C.  Respiratory sinus arrhythmia  In 70 out 62 delta 8  In 67 out 60 delta 7  In 71 out 60 delta 11    Average delta 8.6 (normal)    D, shrug test  Baseline HR 86 /76  Roberto  BP 78/55  Pattern showed initial BP increased followed by decline and late overshoot    3- Supine rest for 5 minutes:   HR 62, Max /72    4- TILT at 70 degrees for 20 minutes: (1 liter fluid was given before tilt)  Baseline HR 62 /72  Immediate Roberto HR 91   /56     time from standing to roberto 17 seconds     time from roberto to recovery 7 seconds  30 seconds HR 92 /71  1 min:  110/69, /69  2 min: , /72  3 min: , /65  4 min: , /64  5 min: , /66  6 min: , /71  7 min: HR 98, /67  8 min: , /66  9 min: , /67  10 min: , BP  108/65  12 min: , /64  14 min: , /66  16 min: , /66  18 min: , BP 99/61  20 min: HR 53, BP 70/35--hot, nauseated--syncope  21 minutes HR 80 BP 62/35 patient lying supine recovered promptly    5-saline 500 cc initiated    DISCUSSION:  Autonomic testing was within normal limits.  Patient did exhibit initial orthostatic hypotension immediately following standing and this was associated with dizziness consistent with her history.  Subsequently her blood pressure recovered and she did well with the remainder of the active standing test    Valsalva and respiratory sinus arrhythmia and shrug tests were normal    Tilt table was associated with higher heart rate than was observed during active standing but blood pressure was well-maintained until about 19 minutes when there was an abrupt drop in heart rate and blood pressure with vasovagal symptoms and syncope.  Patient recalls feeling hot prior to the event and does recall feeling hot during spontaneous events but generally recognizes that symptom after rather than as a premonitory symptom.  Nonetheless the tilt study in conjunction with her history of spontaneous events can be interpreted as indicative of susceptibility to reflex faints although the induced symptoms were not precisely those that she recalls during spontaneous events.    Findings were discussed with the patient and parents on station 2A after the procedure.  Focus was on increased salt and electrolyte intake and standing training.  Follow-up by video visit 2 to 3 months to assess status.    Dr Jiménez was present throughout the entire procedure.      I appreciated the opportunity to see and assess Ms Curry and direct the procedure described above . The above note summarizes my findings and current recommendations. Please do not hesitate to contact me if you have any questions or concerns.    Issac Jiménez MD [facility]   Pager [phone]  Office [phone]

## 2024-05-08 NOTE — H&P
Electrophysiology Pre-Procedure History and Physical    Aniyah Curry MRN# [id number]   Age: 18 year old YOB: 2005      Date of Procedure: 5/8/24 [facility]      Date of Exam 5/8/2024 Facility (Same day)       HPI:  Aniyah Curry is a 18 year old female who was evaluated in clinic 4/4/24 by Dr Jiménez due to history of syncope. She reported issues with syncope and issues with her heart rate for several years, which she has previously completed ambulatory monitors for. Patient with several different episodes of syncope. The end of October 2023 she was sitting on a bar stool, fell and hit her chin on the bar requiring 3 stitched, she did not have presyncopal symptoms and has no recollection of the event. She also has some symptoms of orthostatic hypotension, with 10-20 seconds of vision burring and dizziness with postural changes. She has also had an extensive workup for intermittent left sided chest pain. Dr Jiménez had discussed chest pain may trigger near syncope/or syncopal event. In any case, autonomic testing was discussed to further clarify diagnosis, she presents today for tilt study.          Active problem list:     Patient Active Problem List    Diagnosis Date Noted    PFO (patent foramen ovale) 09/18/2022     Priority: Medium    NSVT (nonsustained ventricular tachycardia) (H) 09/18/2022     Priority: Medium    Seasonal allergies 09/25/2013     Priority: Medium    Scoliosis 09/26/2012     Priority: Medium    Benign joint hypermobility 09/05/2012     Priority: Medium    Family history of celiac disease 04/09/2012     Priority: Medium     GI recommends yearly screening.      Other congenital deformity of hip (joint) 2005     Priority: Medium     Hip click noted at birth  ultrasound showed laxity                Medications (include herbals and vitamins):      No current facility-administered medications for this encounter.      Current Outpatient Medications   Medication Sig Dispense Refill    metoclopramide (REGLAN) 5 MG tablet Take 5 mg by mouth as needed      omeprazole (PRILOSEC) 20 MG DR capsule Take 1 capsule (20 mg) by mouth daily 30 capsule 0           Medication List         Notice    Cannot display discharge medications because the patient has not yet been admitted.              Allergies:      Allergies   Allergen Reactions    No Known Drug Allergy              Social History:     Social History     Tobacco Use    Smoking status: Never     Passive exposure: Never    Smokeless tobacco: Never   Substance Use Topics    Alcohol use: No            Physical Exam:   All vitals have been reviewed    Airway assessment:   Patient is able to open mouth wide  Patient is able to stick out tongue}      ENT:   normocephalic, without obvious abnormality     Neck:   supple, symmetrical, trachea midline     Lungs:   No increased work of breathing, good air exchange, clear to auscultation bilaterally, no crackles or wheezing     Cardiovascular:   normal S1 and S2 and no edema             Lab / Radiology Results:     Lab Results   Component Value Date    WBC 6.9 05/06/2024    WBC 6.2 04/13/2014    RBC 4.10 05/06/2024    RBC 4.32 05/07/2013    HGB 12.6 05/06/2024    HGB 13.2 05/07/2013    HCT 36.8 05/06/2024    HCT 38.0 05/07/2013    MCV 90 05/06/2024    MCV 88 05/07/2013    RDW 11.9 05/06/2024    RDW 11.9 05/07/2013     05/06/2024     05/07/2013      Lab Results   Component Value Date    WBC 6.9 05/06/2024    WBC 6.2 04/13/2014     Lab Results   Component Value Date     05/06/2024     05/07/2013     Lab Results   Component Value Date    HGB 12.6 05/06/2024    HGB 13.2 05/07/2013    HCT 36.8 05/06/2024    HCT 38.0 05/07/2013     Lab Results   Component Value Date     05/06/2024     07/03/2012    CO2 25 05/06/2024    CO2 28 12/30/2021    CO2 26 07/03/2012    BUN 8.0 05/06/2024    BUN 10 12/30/2021    BUN 14  07/03/2012     Lab Results   Component Value Date     05/06/2024     07/03/2012    CO2 25 05/06/2024    CO2 28 12/30/2021    CO2 26 07/03/2012    BUN 8.0 05/06/2024    BUN 10 12/30/2021    BUN 14 07/03/2012     Lab Results   Component Value Date    TSH 2.22 01/04/2024    TSH 2.63 09/14/2022    TSH 2.98 05/07/2013            Plan:   Patient's active problems diagnostically and therapeutically optimized for the planned procedure. Patient here for tilt table study. Procedure explained in detail to patient including indications, risks, and benefits. Patient states understanding and wishes to procced.       Margaret Sterling PA-C  [facility]  Electrophysiology Consult Service  Pager: [phone]

## 2024-05-10 ENCOUNTER — PRE VISIT (OUTPATIENT)
Dept: NEUROSURGERY | Facility: CLINIC | Age: 19
End: 2024-05-10

## 2024-05-15 ENCOUNTER — OFFICE VISIT (OUTPATIENT)
Dept: PEDIATRICS | Facility: CLINIC | Age: 19
End: 2024-05-15
Payer: COMMERCIAL

## 2024-05-15 ENCOUNTER — TELEPHONE (OUTPATIENT)
Dept: PEDIATRICS | Facility: CLINIC | Age: 19
End: 2024-05-15

## 2024-05-15 VITALS
TEMPERATURE: 97.8 F | RESPIRATION RATE: 16 BRPM | SYSTOLIC BLOOD PRESSURE: 98 MMHG | WEIGHT: 110.7 LBS | HEIGHT: 63 IN | BODY MASS INDEX: 19.61 KG/M2 | HEART RATE: 76 BPM | OXYGEN SATURATION: 98 % | DIASTOLIC BLOOD PRESSURE: 62 MMHG

## 2024-05-15 DIAGNOSIS — R55 SYNCOPE, UNSPECIFIED SYNCOPE TYPE: ICD-10-CM

## 2024-05-15 DIAGNOSIS — E34.8 PINEAL GLAND CYST: ICD-10-CM

## 2024-05-15 DIAGNOSIS — G44.52 NEW DAILY PERSISTENT HEADACHE: Primary | ICD-10-CM

## 2024-05-15 DIAGNOSIS — G43.911 INTRACTABLE MIGRAINE WITH STATUS MIGRAINOSUS, UNSPECIFIED MIGRAINE TYPE: ICD-10-CM

## 2024-05-15 DIAGNOSIS — F43.23 SITUATIONAL MIXED ANXIETY AND DEPRESSIVE DISORDER: ICD-10-CM

## 2024-05-15 DIAGNOSIS — R42 POSTURAL DIZZINESS: ICD-10-CM

## 2024-05-15 DIAGNOSIS — R07.89 ATYPICAL CHEST PAIN: ICD-10-CM

## 2024-05-15 DIAGNOSIS — G93.0 SUBARACHNOID CYST: ICD-10-CM

## 2024-05-15 PROCEDURE — 99214 OFFICE O/P EST MOD 30 MIN: CPT | Performed by: INTERNAL MEDICINE

## 2024-05-15 RX ORDER — RIZATRIPTAN BENZOATE 5 MG/1
5-10 TABLET, ORALLY DISINTEGRATING ORAL
Qty: 18 TABLET | Refills: 1 | Status: SHIPPED | OUTPATIENT
Start: 2024-05-15 | End: 2024-07-03 | Stop reason: ALTCHOICE

## 2024-05-15 RX ORDER — ESCITALOPRAM OXALATE 5 MG/1
5 TABLET ORAL DAILY
Qty: 30 TABLET | Refills: 1 | Status: SHIPPED | OUTPATIENT
Start: 2024-05-15 | End: 2024-07-31

## 2024-05-15 RX ORDER — ONDANSETRON 4 MG/1
4-8 TABLET, ORALLY DISINTEGRATING ORAL EVERY 8 HOURS PRN
Qty: 20 TABLET | Refills: 1 | Status: SHIPPED | OUTPATIENT
Start: 2024-05-15

## 2024-05-15 RX ORDER — AMITRIPTYLINE HYDROCHLORIDE 10 MG/1
10 TABLET ORAL AT BEDTIME
Qty: 60 TABLET | Refills: 1 | Status: SHIPPED | OUTPATIENT
Start: 2024-05-15 | End: 2024-06-19 | Stop reason: ALTCHOICE

## 2024-05-15 ASSESSMENT — PAIN SCALES - GENERAL: PAINLEVEL: SEVERE PAIN (6)

## 2024-05-15 NOTE — PROGRESS NOTES
Assessment & Plan     New daily persistent headache  Has seen neurosurgery, who didn't think her symptoms were from cysts and recommended she see neurology. Can't get appt for 2 more months with neuro.   - Adult Neurology  Referral; Future  - ondansetron (ZOFRAN ODT) 4 MG ODT tab; Take 1-2 tablets (4-8 mg) by mouth every 8 hours as needed for nausea  - rizatriptan (MAXALT-MLT) 5 MG ODT; Take 1-2 tablets (5-10 mg) by mouth at onset of headache for migraine May repeat in 2 hours. Max 6 tablets/24 hours.  - amitriptyline (ELAVIL) 10 MG tablet; Take 1 tablet (10 mg) by mouth at bedtime May increase up to 2-3 tablets at bedtime if not helpful after 1-2 weeks.    Subarachnoid cyst  Discussed second opinion. Referral done for [place].  - Adult Neurology  Referral; Future    Pineal gland cyst  - Adult Neurology  Referral; Future    Intractable migraine with status migrainosus, unspecified migraine type  Chronic headache with flares. She is concerned these may be due to cysts, but do have some features of migraine. Plan trial on triptan. If does well, could add amitriptyline at bedtime in a few weeks.   - rizatriptan (MAXALT-MLT) 5 MG ODT; Take 1-2 tablets (5-10 mg) by mouth at onset of headache for migraine May repeat in 2 hours. Max 6 tablets/24 hours.    Situational mixed anxiety and depressive disorder  Significant anxiety and mood given recurrent syncope, having to drop out of school, and overall really not feeling well, headaches, nausea. Discussed option to treat, and she would like to proceed. I've explained to her that drugs of the SSRI class can have side effects such as weight gain, sexual dysfunction, insomnia, headache, nausea. These medications are generally effective at alleviating symptoms of anxiety and/or depression. Let me know if significant side effects do occur.   - escitalopram (LEXAPRO) 5 MG tablet; Take 1 tablet (5 mg) by mouth daily    Postural dizziness  Not thought to be  POTS. Recommended standing exercises and high sodium diet. Reviewed this plan. I did suggest she meet with cardiology after 6 weeks or so of exercises to discuss whether she may need medications to manage her symptoms.    Syncope, unspecified syncope type  Episode of syncope on tilt table associated with bradycardia and hypotension.    Atypical chest pain  L lower rib pain anterior and lateral. Does not seem associated with headache. So far evaluation, including CT, have been unremarkable. She had negative celiac antibodies 6 months ago. For now, has been following with cardiology, would pursue plan as mentioned above. Discussed whether this could be abdominal migraines. Will see how she does with triptan/amitriptyline    39 minutes spent in discussion with patient and clinical care.    MED REC REQUIRED  Post Medication Reconciliation Status: discharge medications reconciled and changed, per note/orders    See Patient Instructions    Katty Fermin is a 18 year old, presenting for the following health issues:  Hospital F/U (Headache)        5/15/2024    10:55 AM   Additional Questions   Roomed by Deisi JONAS   Accompanied by JARETT     History of Present Illness       Reason for visit:  Follow up after er    She eats 2-3 servings of fruits and vegetables daily.She consumes 2 sweetened beverage(s) daily.She exercises with enough effort to increase her heart rate 30 to 60 minutes per day.  She exercises with enough effort to increase her heart rate 5 days per week.   She is taking medications regularly.     Headaches still wrapping around forehead to back of head. Waking up with headaches. Headaches also associated with L sided abdominal swelling and tenderness, hurts to take a breath. Pain wraps around to L flank. Headaches is always there. Sometimes lays in bed with an ice pack on her head. Often starts at night and goes through the night.       Abdominal pain is always there. Does have nausea but no vomiting. Worse after  "eating sometimes. No constipation. No hematochezia. Doesn't seem to worsen when headache worsens.       ED/UC Followup:    Facility:  [facility]  Date of visit: 5/8/24  Reason for visit: Headaches  Current Status: No changes         Review of Systems  Constitutional, HEENT, cardiovascular, pulmonary, gi and gu systems are negative, except as otherwise noted.      Objective    BP 98/62 (BP Location: Right arm, Patient Position: Sitting, Cuff Size: Adult Regular)   Pulse 76   Temp 97.8  F (36.6  C) (Oral)   Resp 16   Ht 1.6 m (5' 2.99\")   Wt 50.2 kg (110 lb 11.2 oz)   LMP 04/10/2024 (Exact Date)   SpO2 98%   BMI 19.61 kg/m    Body mass index is 19.61 kg/m .  Physical Exam   GENERAL: alert and no distress  PSYCH: mentation appears normal, affect normal/bright    Admission on 05/05/2024, Discharged on 05/06/2024   Component Date Value Ref Range Status    Sodium 05/06/2024 139  135 - 145 mmol/L Final    Reference intervals for this test were updated on 09/26/2023 to more accurately reflect our healthy population. There may be differences in the flagging of prior results with similar values performed with this method. Interpretation of those prior results can be made in the context of the updated reference intervals.     Potassium 05/06/2024 3.8  3.4 - 5.3 mmol/L Final    Carbon Dioxide (CO2) 05/06/2024 25  22 - 29 mmol/L Final    Anion Gap 05/06/2024 9  7 - 15 mmol/L Final    Urea Nitrogen 05/06/2024 8.0  6.0 - 20.0 mg/dL Final    Creatinine 05/06/2024 0.75  0.51 - 0.95 mg/dL Final    GFR Estimate 05/06/2024 >90  >60 mL/min/1.73m2 Final    Calcium 05/06/2024 9.1  8.6 - 10.0 mg/dL Final    Chloride 05/06/2024 105  98 - 107 mmol/L Final    Glucose 05/06/2024 101 (H)  70 - 99 mg/dL Final    Alkaline Phosphatase 05/06/2024 43  40 - 150 U/L Final    Reference intervals for this test were updated on 11/14/2023 to more accurately reflect our healthy population. There may be " differences in the flagging of prior results with similar values performed with this method. Interpretation of those prior results can be made in the context of the updated reference intervals.    AST 05/06/2024 19  0 - 35 U/L Final    Reference intervals for this test were updated on 6/12/2023 to more accurately reflect our healthy population. There may be differences in the flagging of prior results with similar values performed with this method. Interpretation of those prior results can be made in the context of the updated reference intervals.    ALT 05/06/2024 15  0 - 50 U/L Final    Reference intervals for this test were updated on 6/12/2023 to more accurately reflect our healthy population. There may be differences in the flagging of prior results with similar values performed with this method. Interpretation of those prior results can be made in the context of the updated reference intervals.      Protein Total 05/06/2024 7.0  6.4 - 8.3 g/dL Final    Albumin 05/06/2024 4.0  3.5 - 5.2 g/dL Final    Bilirubin Total 05/06/2024 0.3  <=1.2 mg/dL Final    Lipase 05/06/2024 28  13 - 60 U/L Final    WBC Count 05/06/2024 6.9  4.0 - 11.0 10e3/uL Final    RBC Count 05/06/2024 4.10  3.80 - 5.20 10e6/uL Final    Hemoglobin 05/06/2024 12.6  11.7 - 15.7 g/dL Final    Hematocrit 05/06/2024 36.8  35.0 - 47.0 % Final    MCV 05/06/2024 90  78 - 100 fL Final    MCH 05/06/2024 30.7  26.5 - 33.0 pg Final    MCHC 05/06/2024 34.2  31.5 - 36.5 g/dL Final    RDW 05/06/2024 11.9  10.0 - 15.0 % Final    Platelet Count 05/06/2024 243  150 - 450 10e3/uL Final    % Neutrophils 05/06/2024 40  % Final    % Lymphocytes 05/06/2024 49  % Final    % Monocytes 05/06/2024 9  % Final    % Eosinophils 05/06/2024 1  % Final    % Basophils 05/06/2024 1  % Final    % Immature Granulocytes 05/06/2024 0  % Final    NRBCs per 100 WBC 05/06/2024 0  <1 /100 Final    Absolute Neutrophils 05/06/2024 2.8  1.6 - 8.3 10e3/uL Final    Absolute Lymphocytes  05/06/2024 3.4  0.8 - 5.3 10e3/uL Final    Absolute Monocytes 05/06/2024 0.6  0.0 - 1.3 10e3/uL Final    Absolute Eosinophils 05/06/2024 0.1  0.0 - 0.7 10e3/uL Final    Absolute Basophils 05/06/2024 0.1  0.0 - 0.2 10e3/uL Final    Absolute Immature Granulocytes 05/06/2024 0.0  <=0.4 10e3/uL Final    Absolute NRBCs 05/06/2024 0.0  10e3/uL Final           Signed Electronically by: Maria Elena Albarado MD

## 2024-05-15 NOTE — TELEPHONE ENCOUNTER
Patient seen today for ER follow up with Dr. Albarado.   She entered a [facility] referral.     Routing to MA/TC Team - Please fax the 5/15/2024 Neurology referral with the office visit notes to [facility].   (May have to look up the fax number online).

## 2024-05-15 NOTE — PATIENT INSTRUCTIONS
I've done a referral to [facility]. You should reach out to them regarding this. We will as well. They usually call patients when they are ready to schedule.  They may want you to see neurosurgery and cardiology as well.    Or,  You can call the [facility] [phone] to set up an appointment.  [facility] [phone].    Let's see how soon we can get you in to see neurology.   If still more than a month, let me know. We could try migraine medication    Try maxalt right at onset of next bad headache. May repeat dose in 2 hours.  If not helpful, with next headache try 2 tablets at onset.     After 2 weeks on lexapro if you are not having side effects, we could try amitriptyline 10 mg at bedtime.

## 2024-05-15 NOTE — TELEPHONE ENCOUNTER
Faxed referral- today's note is not signed yet.  Will postpone until tomorrow for station to fax OV note when completed.      Anay VERMA, - [facility]  Primary Care- [facility], [facility] [place]  [facility]

## 2024-05-17 ENCOUNTER — TELEPHONE (OUTPATIENT)
Dept: PEDIATRICS | Facility: CLINIC | Age: 19
End: 2024-05-17
Payer: COMMERCIAL

## 2024-05-17 NOTE — TELEPHONE ENCOUNTER
Called and was able to speak with Jeny.  Due to provider appointment overlapped we consulted with patient and was able to move her appointment earlier to correct provider schedule. Patient agreed and understood if any concerns or changes needed she can contact clinic via phone and mychart.     Neeraj Alfaro MA

## 2024-06-09 ENCOUNTER — APPOINTMENT (OUTPATIENT)
Dept: ULTRASOUND IMAGING | Facility: CLINIC | Age: 19
End: 2024-06-09
Attending: EMERGENCY MEDICINE
Payer: COMMERCIAL

## 2024-06-09 ENCOUNTER — HOSPITAL ENCOUNTER (EMERGENCY)
Facility: CLINIC | Age: 19
Discharge: HOME | End: 2024-06-09
Attending: EMERGENCY MEDICINE | Admitting: EMERGENCY MEDICINE
Payer: COMMERCIAL

## 2024-06-09 VITALS
TEMPERATURE: 97.6 F | HEART RATE: 100 BPM | BODY MASS INDEX: 19.18 KG/M2 | OXYGEN SATURATION: 77 % | RESPIRATION RATE: 14 BRPM | SYSTOLIC BLOOD PRESSURE: 102 MMHG | HEIGHT: 63 IN | DIASTOLIC BLOOD PRESSURE: 70 MMHG | WEIGHT: 108.25 LBS

## 2024-06-09 DIAGNOSIS — R10.12 LUQ ABDOMINAL PAIN: ICD-10-CM

## 2024-06-09 LAB
ALBUMIN SERPL BCG-MCNC: 4.1 G/DL (ref 3.5–5.2)
ALBUMIN UR-MCNC: NEGATIVE MG/DL
ALP SERPL-CCNC: 52 U/L (ref 40–150)
ALT SERPL W P-5'-P-CCNC: 12 U/L (ref 0–50)
ANION GAP SERPL CALCULATED.3IONS-SCNC: 12 MMOL/L (ref 7–15)
APPEARANCE UR: CLEAR
AST SERPL W P-5'-P-CCNC: 19 U/L (ref 0–35)
BASOPHILS # BLD AUTO: 0 10E3/UL (ref 0–0.2)
BASOPHILS NFR BLD AUTO: 1 %
BILIRUB SERPL-MCNC: 0.5 MG/DL
BILIRUB UR QL STRIP: NEGATIVE
BUN SERPL-MCNC: 15 MG/DL (ref 6–20)
CALCIUM SERPL-MCNC: 9.4 MG/DL (ref 8.6–10)
CHLORIDE SERPL-SCNC: 105 MMOL/L (ref 98–107)
COLOR UR AUTO: ABNORMAL
CREAT SERPL-MCNC: 0.73 MG/DL (ref 0.51–0.95)
DEPRECATED HCO3 PLAS-SCNC: 23 MMOL/L (ref 22–29)
EGFRCR SERPLBLD CKD-EPI 2021: >90 ML/MIN/1.73M2
EOSINOPHIL # BLD AUTO: 0.2 10E3/UL (ref 0–0.7)
EOSINOPHIL NFR BLD AUTO: 2 %
ERYTHROCYTE [DISTWIDTH] IN BLOOD BY AUTOMATED COUNT: 12.8 % (ref 10–15)
GLUCOSE SERPL-MCNC: 96 MG/DL (ref 70–99)
GLUCOSE UR STRIP-MCNC: NEGATIVE MG/DL
HCG UR QL: NEGATIVE
HCT VFR BLD AUTO: 38.8 % (ref 35–47)
HGB BLD-MCNC: 13.2 G/DL (ref 11.7–15.7)
HGB UR QL STRIP: ABNORMAL
IMM GRANULOCYTES # BLD: 0 10E3/UL
IMM GRANULOCYTES NFR BLD: 0 %
KETONES UR STRIP-MCNC: NEGATIVE MG/DL
LEUKOCYTE ESTERASE UR QL STRIP: NEGATIVE
LIPASE SERPL-CCNC: 23 U/L (ref 13–60)
LYMPHOCYTES # BLD AUTO: 1.6 10E3/UL (ref 0.8–5.3)
LYMPHOCYTES NFR BLD AUTO: 25 %
MCH RBC QN AUTO: 31.6 PG (ref 26.5–33)
MCHC RBC AUTO-ENTMCNC: 34 G/DL (ref 31.5–36.5)
MCV RBC AUTO: 93 FL (ref 78–100)
MONOCYTES # BLD AUTO: 0.7 10E3/UL (ref 0–1.3)
MONOCYTES NFR BLD AUTO: 11 %
MUCOUS THREADS #/AREA URNS LPF: PRESENT /LPF
NEUTROPHILS # BLD AUTO: 3.9 10E3/UL (ref 1.6–8.3)
NEUTROPHILS NFR BLD AUTO: 61 %
NITRATE UR QL: NEGATIVE
NRBC # BLD AUTO: 0 10E3/UL
NRBC BLD AUTO-RTO: 0 /100
PH UR STRIP: 6.5 [PH] (ref 5–7)
PLATELET # BLD AUTO: 189 10E3/UL (ref 150–450)
POTASSIUM SERPL-SCNC: 3.6 MMOL/L (ref 3.4–5.3)
PROT SERPL-MCNC: 7.4 G/DL (ref 6.4–8.3)
RBC # BLD AUTO: 4.18 10E6/UL (ref 3.8–5.2)
RBC URINE: 1 /HPF
SODIUM SERPL-SCNC: 140 MMOL/L (ref 135–145)
SP GR UR STRIP: 1.02 (ref 1–1.03)
SQUAMOUS EPITHELIAL: 1 /HPF
UROBILINOGEN UR STRIP-MCNC: NORMAL MG/DL
WBC # BLD AUTO: 6.5 10E3/UL (ref 4–11)
WBC URINE: 1 /HPF

## 2024-06-09 PROCEDURE — 80053 COMPREHEN METABOLIC PANEL: CPT | Performed by: EMERGENCY MEDICINE

## 2024-06-09 PROCEDURE — 83690 ASSAY OF LIPASE: CPT | Performed by: EMERGENCY MEDICINE

## 2024-06-09 PROCEDURE — 85025 COMPLETE CBC W/AUTO DIFF WBC: CPT | Performed by: EMERGENCY MEDICINE

## 2024-06-09 PROCEDURE — 81001 URINALYSIS AUTO W/SCOPE: CPT | Performed by: EMERGENCY MEDICINE

## 2024-06-09 PROCEDURE — 81025 URINE PREGNANCY TEST: CPT | Performed by: EMERGENCY MEDICINE

## 2024-06-09 PROCEDURE — 76705 ECHO EXAM OF ABDOMEN: CPT

## 2024-06-09 PROCEDURE — 96374 THER/PROPH/DIAG INJ IV PUSH: CPT

## 2024-06-09 PROCEDURE — 250N000011 HC RX IP 250 OP 636: Mod: JZ | Performed by: EMERGENCY MEDICINE

## 2024-06-09 PROCEDURE — 99285 EMERGENCY DEPT VISIT HI MDM: CPT | Mod: 25

## 2024-06-09 PROCEDURE — 36415 COLL VENOUS BLD VENIPUNCTURE: CPT | Performed by: EMERGENCY MEDICINE

## 2024-06-09 PROCEDURE — 96375 TX/PRO/DX INJ NEW DRUG ADDON: CPT

## 2024-06-09 RX ORDER — ONDANSETRON 2 MG/ML
4 INJECTION INTRAMUSCULAR; INTRAVENOUS
Status: DISCONTINUED | OUTPATIENT
Start: 2024-06-09 | End: 2024-06-09 | Stop reason: HOSPADM

## 2024-06-09 RX ADMIN — ONDANSETRON 4 MG: 2 INJECTION INTRAMUSCULAR; INTRAVENOUS at 20:06

## 2024-06-09 RX ADMIN — FAMOTIDINE 20 MG: 10 INJECTION, SOLUTION INTRAVENOUS at 20:06

## 2024-06-09 ASSESSMENT — ACTIVITIES OF DAILY LIVING (ADL)
ADLS_ACUITY_SCORE: 35
ADLS_ACUITY_SCORE: 35

## 2024-06-09 ASSESSMENT — COLUMBIA-SUICIDE SEVERITY RATING SCALE - C-SSRS
2. HAVE YOU ACTUALLY HAD ANY THOUGHTS OF KILLING YOURSELF IN THE PAST MONTH?: NO
1. IN THE PAST MONTH, HAVE YOU WISHED YOU WERE DEAD OR WISHED YOU COULD GO TO SLEEP AND NOT WAKE UP?: NO
6. HAVE YOU EVER DONE ANYTHING, STARTED TO DO ANYTHING, OR PREPARED TO DO ANYTHING TO END YOUR LIFE?: NO

## 2024-06-09 NOTE — ED TRIAGE NOTES
"Pt states that she has a headache that is always present.  She states that she is trying to get in to see a neurologist.  She states that this morning she started having LUQ abdominal pain that worsens when she eats and drinks.  She states that she has associated nausea and dizziness.  She also states that her \"heart is racing\".       Triage Assessment (Adult)       Row Name 06/09/24 8733          Triage Assessment    Airway WDL WDL        Respiratory WDL    Respiratory WDL WDL        Cardiac WDL    Cardiac WDL WDL                     "

## 2024-06-10 ENCOUNTER — TELEPHONE (OUTPATIENT)
Dept: GASTROENTEROLOGY | Facility: CLINIC | Age: 19
End: 2024-06-10

## 2024-06-10 ENCOUNTER — TELEPHONE (OUTPATIENT)
Dept: PEDIATRICS | Facility: CLINIC | Age: 19
End: 2024-06-10
Payer: COMMERCIAL

## 2024-06-10 ENCOUNTER — MYC MEDICAL ADVICE (OUTPATIENT)
Dept: PEDIATRICS | Facility: CLINIC | Age: 19
End: 2024-06-10
Payer: COMMERCIAL

## 2024-06-10 ENCOUNTER — HOSPITAL ENCOUNTER (EMERGENCY)
Facility: CLINIC | Age: 19
Discharge: HOME | End: 2024-06-10
Attending: STUDENT IN AN ORGANIZED HEALTH CARE EDUCATION/TRAINING PROGRAM | Admitting: STUDENT IN AN ORGANIZED HEALTH CARE EDUCATION/TRAINING PROGRAM
Payer: COMMERCIAL

## 2024-06-10 ENCOUNTER — TELEPHONE (OUTPATIENT)
Dept: GASTROENTEROLOGY | Facility: CLINIC | Age: 19
End: 2024-06-10
Payer: COMMERCIAL

## 2024-06-10 VITALS
OXYGEN SATURATION: 99 % | SYSTOLIC BLOOD PRESSURE: 126 MMHG | TEMPERATURE: 98.4 F | HEART RATE: 76 BPM | DIASTOLIC BLOOD PRESSURE: 69 MMHG | RESPIRATION RATE: 18 BRPM

## 2024-06-10 DIAGNOSIS — R10.12 ABDOMINAL PAIN, LEFT UPPER QUADRANT: Primary | ICD-10-CM

## 2024-06-10 DIAGNOSIS — R10.12 LUQ ABDOMINAL PAIN: Primary | ICD-10-CM

## 2024-06-10 PROCEDURE — 99282 EMERGENCY DEPT VISIT SF MDM: CPT

## 2024-06-10 ASSESSMENT — ACTIVITIES OF DAILY LIVING (ADL): ADLS_ACUITY_SCORE: 33

## 2024-06-10 NOTE — ED PROVIDER NOTES
"  Emergency Department Note      History of Present Illness     Chief Complaint  Abdominal Pain and Headache    HPI  Aniyah Curry is a 18 year old female with a history of retrocerebellar arachnoid cyst who presents to the emergency department for abdominal pain and headache. The patient states today, she began experiencing worsening left upper quadrant abdominal pain, confusion, a \"sharp\" headache, and loss of appetite. She adds that this abdominal pain is exacerbated upon eating and drinking. She adds that she was also nauseated earlier today which has since resolved. She took some migraine medications which slightly improved her headache. She has tried antacids for her abdominal pain and nausea with no relief in the past. She notes that since March of 2024, she has been experiencing headaches and adds that for the past month, she has been experiencing intermittent abdominal pains. She reports that she has had several imaging studies of her abdomen and head and was diagnosed with a retrocerebellar cyst. She adds that she is trying to get in to see a neurologist and GI specialist, regarding her symptoms.     Independent Historian  None    Review of External Notes  Reviewed 6/4 and 5/29/2024 outside health system ED visits for abdominal pain.  This included multiple CTs as well as previous renal ultrasound, x-ray abdomen.    Past Medical History   Medical History and Problem List  Retrocerebellar arachnoid cyst    Medications  amitriptyline (ELAVIL) 10 MG tablet  escitalopram (LEXAPRO) 5 MG tablet  metoclopramide (REGLAN) 5 MG tablet  omeprazole (PRILOSEC) 20 MG DR capsule  ondansetron (ZOFRAN ODT) 4 MG ODT tab  rizatriptan (MAXALT-MLT) 5 MG ODT    Surgical History   Appendectomy  EP study, tilt table  Labral tear    Physical Exam   Patient Vitals for the past 24 hrs:   BP Temp Temp src Pulse Resp SpO2 Height Weight   06/09/24 2142 102/70 -- -- 100 14 (!) 77 % -- --   06/09/24 1756 110/70 97.6  F (36.4  C) " "Temporal 103 17 100 % 1.6 m (5' 3\") 49.1 kg (108 lb 3.9 oz)     Physical Exam  Constitutional: Alert, attentive  HENT:    Nose: Nose normal.    Mouth/Throat: Oropharynx is clear, mucous membranes are moist   Eyes: EOM are normal.   CV: regular rate and rhythm; no murmurs, rubs or gallups  Chest: Effort normal and breath sounds normal.   GI:  There is mild left upper quadrant tenderness. No distension. Normal bowel sounds  MSK: Normal range of motion.   Neurological: Alert, attentive  Skin: Skin is warm and dry.    Diagnostics   Lab Results   Labs Ordered and Resulted from Time of ED Arrival to Time of ED Departure   ROUTINE UA WITH MICROSCOPIC REFLEX TO CULTURE - Abnormal       Result Value    Color Urine Light Yellow      Appearance Urine Clear      Glucose Urine Negative      Bilirubin Urine Negative      Ketones Urine Negative      Specific Gravity Urine 1.025      Blood Urine Moderate (*)     pH Urine 6.5      Protein Albumin Urine Negative      Urobilinogen Urine Normal      Nitrite Urine Negative      Leukocyte Esterase Urine Negative      Mucus Urine Present (*)     RBC Urine 1      WBC Urine 1      Squamous Epithelials Urine 1     COMPREHENSIVE METABOLIC PANEL - Normal    Sodium 140      Potassium 3.6      Carbon Dioxide (CO2) 23      Anion Gap 12      Urea Nitrogen 15.0      Creatinine 0.73      GFR Estimate >90      Calcium 9.4      Chloride 105      Glucose 96      Alkaline Phosphatase 52      AST 19      ALT 12      Protein Total 7.4      Albumin 4.1      Bilirubin Total 0.5     LIPASE - Normal    Lipase 23     HCG QUALITATIVE URINE - Normal    hCG Urine Qualitative Negative     CBC WITH PLATELETS AND DIFFERENTIAL    WBC Count 6.5      RBC Count 4.18      Hemoglobin 13.2      Hematocrit 38.8      MCV 93      MCH 31.6      MCHC 34.0      RDW 12.8      Platelet Count 189      % Neutrophils 61      % Lymphocytes 25      % Monocytes 11      % Eosinophils 2      % Basophils 1      % Immature Granulocytes 0   "    NRBCs per 100 WBC 0      Absolute Neutrophils 3.9      Absolute Lymphocytes 1.6      Absolute Monocytes 0.7      Absolute Eosinophils 0.2      Absolute Basophils 0.0      Absolute Immature Granulocytes 0.0      Absolute NRBCs 0.0       Imaging  US Abdomen Limited   Final Result   IMPRESSION:   1.  Normal limited abdominal ultrasound.              Independent Interpretation  None  ED Course    Medications Administered  Medications   famotidine (PEPCID) injection 20 mg (20 mg Intravenous $Given 6/9/24 2006)     Discussion of Management  None    Social Determinants of Health adding to complexity of care  None    ED Course  ED Course as of 06/10/24 0104   Sun Jun 09, 2024 1944 I obtained history and examined the patient as noted above.      2129 I rechecked the patient. I discussed findings and discharge with the patient. All questions answered.        Medical Decision Making / Diagnosis   CMS Diagnoses: None    MIPS  None    [facility]  Aniyah Curry is a 18 year old female who is presenting with mild but persistent, chronic headache and as well as subacute to chronic left upper quadrant pain.  She has had an extensive negative workup for her abdominal pain, which is her primary concern today.  Screening labs are reassuring.  There is no lower quadrant abdominal pain or tenderness.  Her ultrasound shows no gallstones.  She is pending a GI follow-up and endoscopy, which appears reasonable given the apparent chronicity and unexplained nature of this pain.  Regarding her headache, imaging as shown arachnoid cyst during a headache workup in late April.  No fever, change in character of headache, or other concerns to warrant repeat workup.  She has neurology follow-up pending this month.  She is stable for discharge at this time and would like to be discharged.  Return precautions for fever, worse pain, or any other concerns.    Disposition  The patient was discharged.     ICD-10 Codes:    ICD-10-CM    1. LUQ abdominal  pain  R10.12          Discharge Medications  Discharge Medication List as of 6/9/2024  9:37 PM        Scribe Disclosure:  I, Chaz Rodriguez, am serving as a scribe at 7:48 PM on 6/9/2024 to document services personally performed by Inocencio Savage MD based on my observations and the provider's statements to me.        Inocencio Savage MD  06/10/24 0107

## 2024-06-10 NOTE — ED PROVIDER NOTES
Emergency Department Note      History of Present Illness     Chief Complaint  Abdominal Pain and Vomiting    HPI  Aniyah Curry is a very pleasant 18 year old female with PFO presenting with LUQ abdominal swelling, abdominal pain and vomiting. She has been experiencing abdominal symptoms for the past two months. Patient was seen at [facility] yesterday with similar symptoms. She underwent US and was discharged. Jeny was told to present to the ED if she started vomiting. She endorses one episode of vomiting today the appeared similar to food she's eaten. Jeny has drank some water since vomiting. Last bowel movement was two days ago. She currently endorses nausea. She notes the pain worsens after after eating, but type of food doesn't impact the pain. Patient has a referral for an endoscopy, and she is not followed by GI. She has a virtual appointment scheduled with her family medicine provider on Thursday (6/13/24), but does not have anything scheduled with GI yet. She had a CT recently, that showed a lot of stool in her colon, and she's been taking Senna and Miralax. She had a bowel movement yesterday. Denies black or bloody stool. Of note, she has recently undergone a POTS workup due to fainting at school and cardiac workup for tachycardia. She has tried taking omeprazole and Reglan, but stopped taking both these medications after they didn't provide relief. Patient has a history of arachnoid cyst and appendectomy.         Independent Historian  Mom present at bedside and provided partial history.    Review of External Notes  I personally reviewed notes from the patient's emergency department visit  dated yesterday. This provided me with information regarding patient's baseline medical problems and patient's recent other emergency department presentation.   Past Medical History   Medical History and Problem List  RetroCerebellar arachnoid cyst  Seasonal allergic rhinitis  Congenital deformity of hip  joint  Scoliosis  PFO  NSVT    Medications  Amitriptyline  Colchicine  Escitalopram  Metoclopramide  Sennosides-docusate sodium  Ondansetron  Rizatriptan    Surgical History   Appendectomy  Tilt table study     Physical Exam   Patient Vitals for the past 24 hrs:   BP Temp Temp src Pulse Resp SpO2   06/10/24 1105 126/69 98.4  F (36.9  C) Temporal 76 18 99 %      Physical Exam  Vitals and nursing note reviewed.   Constitutional:       General: She is not in acute distress.     Appearance: She is well-developed. She is not ill-appearing or diaphoretic.   Cardiovascular:      Rate and Rhythm: Normal rate.   Pulmonary:      Effort: Pulmonary effort is normal.   Abdominal:      General: Abdomen is flat.      Palpations: Abdomen is soft.      Tenderness: There is abdominal tenderness in the left upper quadrant. There is no guarding or rebound. Negative signs include McBurney's sign.   Skin:     General: Skin is warm and dry.      Coloration: Skin is not jaundiced.   Neurological:      Mental Status: She is alert.            Diagnostics   Lab Results   Labs Ordered and Resulted from Time of ED Arrival to Time of ED Departure - No data to display    Imaging  No orders to display       EKG   No ECG performed.       Independent Interpretation  None  ED Course    Medications Administered  Medications - No data to display    Procedures  Procedures   None performed    Discussion of Management  None    Social Determinants of Health adding to complexity of care  None.      ED Course  ED Course as of 06/10/24 1522   Mon Clyde 10, 2024   1137 I obtained the history and examined the patient as noted above.        Medical Decision Making / Diagnosis   CMS Diagnoses: None    MIPS     None    MDM  Patient presenting with abdominal pain and 1 episode of vomiting.  Vital signs are reassuring.  Patient does have some tenderness over her left upper quadrant but this seems to be localized to her ribs rather than her abdomen. Of note, patient  has had a number of recent emergency department visits for abdominal pain, on 5/30/2024, 6/4/2024 and 6/9/2024.  In discussion with the patient and her mother, I reviewed recent workup including unremarkable CT of the abdomen and pelvis approximately 10 days ago, and negative labs and ultrasound yesterday.  Workup yesterday was reassuring against cholelithiasis, urinary tract infection, pregnancy, pancreatitis, hepatitis, among others.  Given no significant change in symptoms today, I do not feel that this workup needs to be repeated.  I have extremely low suspicion for acute surgical pathology or bowel obstruction.  Especially the patient's subacute duration of symptoms, pain worse after eating, high suspicion for peptic ulcer disease, gastritis, or other GI disorder.  I recommended the patient continue use of omeprazole daily and also use Mylanta for symptoms of pain.  Recommended she follow-up with GI to schedule an endoscopy and for further evaluation.  Patient and patient's mother were in agreement with this plan.    Disposition  The patient was discharged.     ICD-10 Codes:    ICD-10-CM    1. Abdominal pain, left upper quadrant  R10.12          Discharge Medications  Discharge Medication List as of 6/10/2024 12:25 PM        START taking these medications    Details   alum & mag hydroxide-simethicone (MYLANTA ES/MAALOX  ES) 400-400-40 MG/5ML SUSP Take 30 mLs by mouth every 4 hours as needed for indigestion, Disp-355 mL, R-0, E-Prescribe           Scribe Disclosure:  I, Sophia Steven, am serving as a scribe at 12:24 PM on 6/10/2024 to document services personally performed by George Harris MD based on my observations and the provider's statements to me.    [employer]        George Harris MD  06/10/24 9735

## 2024-06-10 NOTE — DISCHARGE INSTRUCTIONS
It was a pleasure taking care of you today. I hope you feel much better soon.  Please follow up with GI when able to discuss endoscopy and evaluation.  Follow up neurology as scheduled.  Please follow-up with your primary care doctor this week as scheduled.  Return immediately for worse pain, fever, vomiting, or any other concerns.

## 2024-06-10 NOTE — TELEPHONE ENCOUNTER
Received call from patient. Patient reports she was in ED yesterday. Patient was told to go back to ED if she had worsening vomiting. Patient reports worsening vomiting. RN advised patient go back to ED and follow up with GI as advised. Patient verbalized understanding and agreed with plan.     Gio HRUST RN 6/10/2024 at 10:23 AM

## 2024-06-10 NOTE — TELEPHONE ENCOUNTER
Patient called to check on the status of the message.  She is calling for results on the bone scan that was done on 1/3/2024.   Referrals faxed to [facility].     Martine Beltran on 6/10/2024 at 12:16 PM

## 2024-06-10 NOTE — TELEPHONE ENCOUNTER
"Endoscopy Scheduling Screen    Have you had a positive Covid test in the last 14 days?  No    What is your communication preference for Instructions and/or Bowel Prep?   MyChart    What insurance is in the chart?  Other:  BCBS    Ordering/Referring Provider:   NADEEM BENSON    (If ordering provider performs procedure, schedule with ordering provider unless otherwise instructed. )    BMI: Estimated body mass index is 19.17 kg/m  as calculated from the following:    Height as of 6/9/24: 1.6 m (5' 3\").    Weight as of 6/9/24: 49.1 kg (108 lb 3.9 oz).     Sedation Ordered  moderate sedation.   If patient BMI > 50 do not schedule in [facility].    If patient BMI > 45 do not schedule at [facility].    Are you taking methadone or Suboxone?  No    Have you had difficulties, pain, or discomfort during past endoscopy procedures?  No    Are you taking any prescription medications for pain 3 or more times per week?   NO, No RN review required.    Do you have a history of malignant hyperthermia?  No    (Females) Are you currently pregnant?   No     Have you been diagnosed or told you have pulmonary hypertension?   No    Do you have an LVAD?  No    Have you been told you have moderate to severe sleep apnea?  No    Have you been told you have COPD, asthma, or any other lung disease?  No    Do you have any heart conditions?  Yes     In the past year, have you had any hospitalizations for heart related issues including cardiomyopathy, heart attack, or stent placement?  No    Do you have any implantable devices in your body (pacemaker, ICD)?  No    Do you take nitroglycerine?  No    Have you ever had or are you waiting for an organ transplant?  No. Continue scheduling, no site restrictions.    Have you had a stroke or transient ischemic attack (TIA aka \"mini stroke\" in the last 6 months?   No    Have you been diagnosed with or been told you have cirrhosis of the liver?   No    Are you currently on dialysis?   No    Do you need assistance " "transferring?   No    BMI: Estimated body mass index is 19.17 kg/m  as calculated from the following:    Height as of 6/9/24: 1.6 m (5' 3\").    Weight as of 6/9/24: 49.1 kg (108 lb 3.9 oz).     Is patients BMI > 40 and scheduling location [facility]?  No    Do you take an injectable medication for weight loss or diabetes (excluding insulin)?  No    Do you take the medication Naltrexone?  No    Do you take blood thinners?  No       Prep   Are you currently on dialysis or do you have chronic kidney disease?  No    Do you have a diagnosis of diabetes?      Do you have a diagnosis of cystic fibrosis (CF)?      On a regular basis do you go 3 -5 days between bowel movements?      BMI > 40?      Preferred Pharmacy:    [id number] [place], [place] - [facility] [address] [address]  Phone: [phone] Fax: [phone]      Final Scheduling Details     Procedure scheduled  Upper endoscopy (EGD)    Surgeon:  Jaren     Date of procedure:  6/13     Pre-OP / PAC:   No - Not required for this site.    Location  [facility] - Patient preference.    Sedation   Moderate Sedation - Per order.      Patient Reminders:   You will receive a call from a Nurse to review instructions and health history.  This assessment must be completed prior to your procedure.  Failure to complete the Nurse assessment may result in the procedure being cancelled.      On the day of your procedure, please designate an adult(s) who can drive you home stay with you for the next 24 hours. The medicines used in the exam will make you sleepy. You will not be able to drive.      You cannot take public transportation, ride share services, or non-medical taxi service without a responsible caregiver.  Medical transport services are allowed with the requirement that a responsible caregiver will receive you at your destination.  We require that drivers and caregivers are confirmed prior to your procedure.  "

## 2024-06-10 NOTE — ED TRIAGE NOTES
"Pt arrives through triage c/o LUQ for the last \"month or two\" no trauma to the area noted, pt was seen at [facility] ED yesterday, pain worse after eating, no pain or frequency w/ urination, no chance of pregnancy per ptABCD intact.       Triage Assessment (Adult)       Row Name 06/10/24 1107          Triage Assessment    Airway WDL WDL        Respiratory WDL    Respiratory WDL WDL        Skin Circulation/Temperature WDL    Skin Circulation/Temperature WDL WDL        Cardiac WDL    Cardiac WDL WDL        Peripheral/Neurovascular WDL    Peripheral Neurovascular WDL WDL        Cognitive/Neuro/Behavioral WDL    Cognitive/Neuro/Behavioral WDL WDL                     "

## 2024-06-10 NOTE — TELEPHONE ENCOUNTER
Attempted to contact patient in order to complete pre assessment questions.     No answer. Unable to leave message d/t no voice mail.  Phone just kept ringing.    Callback required communication sent via [employer].      Procedure details:    Patient scheduled for Upper endoscopy (EGD) on 06.13.2024.     Arrival time: 0715. Procedure time 0800    Facility location: [facility]; [facility] [address] [place] [address]. Check in location: Main entrance at . Come through the roundabout underneath the awning (not the ER entrance).    Sedation type: Conscious sedation     Pre op exam needed? N/A    Indication for procedure:   LUQ abdominal pain            Chart review:     Electronic implanted devices? No    Recent diagnosis of diverticulitis within the last 6 weeks? No    Diabetic? No      Medication review:    Anticoagulants? No    NSAIDS? No NSAID medications per patient's medication list.  RN will verify with pre-assessment call.    Other medication HOLDING recommendations:  N/A      Prep for procedure:   Prep instructions sent via [employer].       Yanique Lazaro RN  Endoscopy Procedure Pre Assessment

## 2024-06-10 NOTE — DISCHARGE INSTRUCTIONS
Thank you for allowing us to evaluate you today.  Follow up with GI in 1 week for reevaluation..  Take omeprazole daily and the Mylanta as needed  Please read the guidance provided with your discharge instructions.  Immediately return to the emergency department with any concerns.

## 2024-06-11 ENCOUNTER — PATIENT OUTREACH (OUTPATIENT)
Dept: CARE COORDINATION | Facility: CLINIC | Age: 19
End: 2024-06-11
Payer: COMMERCIAL

## 2024-06-11 NOTE — TELEPHONE ENCOUNTER
Pre assessment completed for upcoming procedure.   (Please see previous telephone encounter notes for complete details)    Patient  returned call.       Procedure details:    Arrival time and facility location reviewed.    Pre op exam needed? N/A    Designated  policy reviewed. Instructed to have someone stay 6  hours post procedure.       Medication review:    Medications reviewed. Please see supporting documentation below. Holding recommendations discussed (if applicable).   N/A      Prep for procedure:     Procedure prep instructions reviewed.        Any additional information needed:  N/A      Patient  verbalized understanding and had no questions or concerns at this time.      Yanique Lazaro RN  Endoscopy Procedure Pre Assessment   [phone] option 4

## 2024-06-11 NOTE — TELEPHONE ENCOUNTER
Second call attempt to complete pre assessment.     No answer.  Left message to return call to [phone] #4 by next business day prior to 4PM or procedure will be sent to cancel.     Callback required communication sent via [employer].      Yanique Lazaro RN  Endoscopy Procedure Pre Assessment

## 2024-06-11 NOTE — PROGRESS NOTES
[facility]  Community Health Worker Initial Outreach    CHW Initial Information Gathering:  Referral Source: ED Follow-Up  CHW Additional Questions  If ED/Hospital discharge, follow-up appointment scheduled as recommended?: Yes  Natal[facility]art active?: Yes    Patient accepts CC: No, patient declined at this time. Patient will be sent Care Coordination introduction letter for future reference.       Anay Alvarado  Community Health Worker  [facility] Care Resource [place], [facility]    *Connected Care Resource Team does NOT follow patient ongoing. Referrals are identified based on internal discharge reports and the outreach is to ensure patient has an understanding of their discharge instructions.

## 2024-06-11 NOTE — LETTER
Aniyah Curry  [address] [place] [id number]    Dear Aniyah Curry,      I am a team member within the [facility] Care Resource Center with [facility]. I recently contacted you to ensure you are doing well following a visit within our health system. I also wanted to take this chance to introduce Clinic Care Coordination should you have any interest in this program in the future.    Below is a description of Clinic Care Coordination and how this team can further assist you:       The Clinic Care Coordination team is made up of a Registered Nurse, , Financial Resource Worker, and a Community Health Worker who understand and can help navigate the health care system. The goal of clinic care coordination is to help you manage your health, improve access to care, and achieve optimal health outcomes. They work alongside your provider to assist you in determining your health and social needs, obtain health care and community resources, and provide you with necessary information and education. Clinic Care Coordination can work with you through any barriers and develop a care plan that helps coordinate and strengthen the relationship between you and your care team.    If you wish to connect with the Clinic Care Coordination Team, please let your [facility] Provider or Clinic Care Team know and they can place a referral. The Clinic Care Coordination team will then reach out by phone to further support you.    We are focused on providing you with the highest-quality healthcare experience possible.    Sincerely,   Your care team with [facility]

## 2024-06-12 ENCOUNTER — VIRTUAL VISIT (OUTPATIENT)
Dept: PEDIATRICS | Facility: CLINIC | Age: 19
End: 2024-06-12
Payer: COMMERCIAL

## 2024-06-12 ENCOUNTER — TELEPHONE (OUTPATIENT)
Dept: PEDIATRICS | Facility: CLINIC | Age: 19
End: 2024-06-12

## 2024-06-12 DIAGNOSIS — G44.52 NEW DAILY PERSISTENT HEADACHE: ICD-10-CM

## 2024-06-12 DIAGNOSIS — G93.0 SUBARACHNOID CYST: ICD-10-CM

## 2024-06-12 DIAGNOSIS — R10.12 LUQ ABDOMINAL PAIN: Primary | ICD-10-CM

## 2024-06-12 PROCEDURE — 99214 OFFICE O/P EST MOD 30 MIN: CPT | Mod: 93 | Performed by: INTERNAL MEDICINE

## 2024-06-12 RX ORDER — DICYCLOMINE HCL 20 MG
20 TABLET ORAL 4 TIMES DAILY PRN
Qty: 60 TABLET | Refills: 1 | Status: SHIPPED | OUTPATIENT
Start: 2024-06-12

## 2024-06-12 NOTE — TELEPHONE ENCOUNTER
Called [facility] at [phone]. I was told that pt is scheduled for EGD through them in July. For the appointment for consult, they have left message for pt to contact them.     Updated the details & the reasoning for a consult ASAP. They will reach pt again & will try to fit her in.     Jia SPARKS  Clinic RN  [facility]

## 2024-06-12 NOTE — TELEPHONE ENCOUNTER
Notes from Dr. Albarado:  Could you call over to [place] and request expedited appointment I can talk to a provider if necessary, but has been in ED 4 times in last 10 days for severe abdominal pain, has been going on for 8 weeks. EGD tomorrow but would like her seen in the next 2 weeks if possible.     Jia SPARKS  Clinic RN  [facility]

## 2024-06-12 NOTE — PATIENT INSTRUCTIONS
Aniyah    It was great to see you today at your virtual visit. Here is a summary of our plan:    Take dicyclomine up to 4 times a day as needed for abdominal pain. If it's helpful, you can just take 4 times daily.     Go ahead and start omeprazole. OK to try liquid as needed.     We will call [facility] at [phone] to set up an appointment.   [address] [place] [address] [address], [place]    Consider gluten free trial. Low FODMAP diet.     Take care and let me know if you have any questions.    Warm Regards,  Maria Elena Albarado M.D.

## 2024-06-12 NOTE — Clinical Note
Could you call over to [facility] and request expedited appointment I can talk to a provider if necessary, but has been in ED 4 times in last 10 days for severe abdominal pain, has been going on for 8 weeks. EGD tomorrow but would like her seen in the next 2 weeks if possible. Maria Elena Albarado M.D.

## 2024-06-12 NOTE — PROGRESS NOTES
Jeny is a 18 year old who is being evaluated via a billable telephone visit.    What phone number would you like to be contacted at? [phone]  How would you like to obtain your AVS? Nataliahart  Originating Location (pt. Location): Home    Distant Location (provider location):  On-site    Assessment & Plan     LUQ abdominal pain  Significant evaluation so far negative with multiple ER visits. Has EGD scheduled for tomorrow, not totally gluten free. Had orders to check celiac antibody tests 6 months ago but I don't see they were done. Will send those as well, has been eating more gluten recently.   For symptom control, OK to retry omeprazole, but will also try antispasmodic. Discussed low fodmap diet while waiting to see GI as well. Will try to get her expedited appointment.   - dicyclomine (BENTYL) 20 MG tablet; Take 1 tablet (20 mg) by mouth 4 times daily as needed (abdominal pain)    Subarachnoid cyst  New daily persistent headache  Has upcoming appt with neurology in 2 weeks      Patient Instructions   Aniyah    It was great to see you today at your virtual visit. Here is a summary of our plan:    Take dicyclomine up to 4 times a day as needed for abdominal pain. If it's helpful, you can just take 4 times daily.     Go ahead and start omeprazole. OK to try liquid as needed.     We will call [facility] at [phone] to set up an appointment.   [facility] [address], [place], [place]    Consider gluten free trial. Low FODMAP diet.     Take care and let me know if you have any questions.    Warm Regards,  Maria Elena Albarado M.D.      Subjective   Jeny is a 18 year old, presenting for the following health issues:  RECHECK      6/12/2024     7:20 AM   Additional Questions   Roomed by AL Christian   Accompanied by rah         6/12/2024     7:20 AM   Patient Reported Additional Medications   Patient reports taking the following new medications no     History of Present Illness       Reason  "for visit:  F/U ER visit    She eats 4 or more servings of fruits and vegetables daily.She consumes 1 sweetened beverage(s) daily.She exercises with enough effort to increase her heart rate 30 to 60 minutes per day.  She exercises with enough effort to increase her heart rate 6 days per week.   She is taking medications regularly.     Since 5/29 she has been to ED 4 times for abdominal pain. Sometimes feels twisty and cramping. When it's bad it's sharp and she feels like she needs to take shallow breaths because sometimes it hurts to take a deep breath in when it's bad.    Ends up doubled over, pain is worse each time she goes in. Tried to do referral for GI at [facility] but would have to see FP first. 3 days ago at [facility] had US and there was a very tender spot if she pressed coming from L side and pushing inward. Swelling has been worse, too, L abdoman looks puffy. Had been at [employer] and her feet turned dark purple. Left [employer] early and went to bed. Slept all day. Had ordered celiac testing but doesn't look like this was drawn.     Had vomiting and ongoing abdominal pain. Was rxd omeprazole  Has EGD tomorrow at [facility]. Doesn't have appt yet with gastro.     Mom has noticed balance and coordination is worse. Stumbles and trips. Headache she has gotten used to so isn't going in for that. Hasn't fainted. Still gets dizzy and lightheaded. Doesn't seem related to her abdominal symptoms.     Seeing [facility] 6/28, Dr. Hilda Frances.         Review of Systems  Constitutional, HEENT, cardiovascular, pulmonary, gi and gu systems are negative, except as otherwise noted.      Objective    Vitals - Patient Reported  Weight (Patient Reported): 49.9 kg (110 lb)  Height (Patient Reported): 160 cm (5' 3\")  BMI (Based on Pt Reported Ht/Wt): 19.49        Physical Exam   General: Alert and no distress //Respiratory: No audible wheeze, cough, or shortness of breath // Psychiatric:  Appropriate affect, tone, and pace of " words      Admission on 06/09/2024, Discharged on 06/09/2024   Component Date Value Ref Range Status    Sodium 06/09/2024 140  135 - 145 mmol/L Final    Reference intervals for this test were updated on 09/26/2023 to more accurately reflect our healthy population. There may be differences in the flagging of prior results with similar values performed with this method. Interpretation of those prior results can be made in the context of the updated reference intervals.     Potassium 06/09/2024 3.6  3.4 - 5.3 mmol/L Final    Carbon Dioxide (CO2) 06/09/2024 23  22 - 29 mmol/L Final    Anion Gap 06/09/2024 12  7 - 15 mmol/L Final    Urea Nitrogen 06/09/2024 15.0  6.0 - 20.0 mg/dL Final    Creatinine 06/09/2024 0.73  0.51 - 0.95 mg/dL Final    GFR Estimate 06/09/2024 >90  >60 mL/min/1.73m2 Final    Calcium 06/09/2024 9.4  8.6 - 10.0 mg/dL Final    Chloride 06/09/2024 105  98 - 107 mmol/L Final    Glucose 06/09/2024 96  70 - 99 mg/dL Final    Alkaline Phosphatase 06/09/2024 52  40 - 150 U/L Final    AST 06/09/2024 19  0 - 35 U/L Final    Reference intervals for this test were updated on 6/12/2023 to more accurately reflect our healthy population. There may be differences in the flagging of prior results with similar values performed with this method. Interpretation of those prior results can be made in the context of the updated reference intervals.    ALT 06/09/2024 12  0 - 50 U/L Final    Reference intervals for this test were updated on 6/12/2023 to more accurately reflect our healthy population. There may be differences in the flagging of prior results with similar values performed with this method. Interpretation of those prior results can be made in the context of the updated reference intervals.      Protein Total 06/09/2024 7.4  6.4 - 8.3 g/dL Final    Albumin 06/09/2024 4.1  3.5 - 5.2 g/dL Final    Bilirubin Total 06/09/2024 0.5  <=1.2 mg/dL Final    Lipase 06/09/2024 23  13 - 60 U/L Final    Color Urine  06/09/2024 Light Yellow  Colorless, Straw, Light Yellow, Yellow Final    Appearance Urine 06/09/2024 Clear  Clear Final    Glucose Urine 06/09/2024 Negative  Negative mg/dL Final    Bilirubin Urine 06/09/2024 Negative  Negative Final    Ketones Urine 06/09/2024 Negative  Negative mg/dL Final    Specific Gravity Urine 06/09/2024 1.025  1.003 - 1.035 Final    Blood Urine 06/09/2024 Moderate (A)  Negative Final    pH Urine 06/09/2024 6.5  5.0 - 7.0 Final    Protein Albumin Urine 06/09/2024 Negative  Negative mg/dL Final    Urobilinogen Urine 06/09/2024 Normal  Normal, 2.0 mg/dL Final    Nitrite Urine 06/09/2024 Negative  Negative Final    Leukocyte Esterase Urine 06/09/2024 Negative  Negative Final    Mucus Urine 06/09/2024 Present (A)  None Seen /LPF Final    RBC Urine 06/09/2024 1  <=2 /HPF Final    WBC Urine 06/09/2024 1  <=5 /HPF Final    Squamous Epithelials Urine 06/09/2024 1  <=1 /HPF Final    hCG Urine Qualitative 06/09/2024 Negative  Negative Final    This test is for screening purposes.  Results should be interpreted along with the clinical picture.  Confirmation testing is available if warranted by ordering [id number], HCG Quantitative Pregnancy.    WBC Count 06/09/2024 6.5  4.0 - 11.0 10e3/uL Final    RBC Count 06/09/2024 4.18  3.80 - 5.20 10e6/uL Final    Hemoglobin 06/09/2024 13.2  11.7 - 15.7 g/dL Final    Hematocrit 06/09/2024 38.8  35.0 - 47.0 % Final    MCV 06/09/2024 93  78 - 100 fL Final    MCH 06/09/2024 31.6  26.5 - 33.0 pg Final    MCHC 06/09/2024 34.0  31.5 - 36.5 g/dL Final    RDW 06/09/2024 12.8  10.0 - 15.0 % Final    Platelet Count 06/09/2024 189  150 - 450 10e3/uL Final    % Neutrophils 06/09/2024 61  % Final    % Lymphocytes 06/09/2024 25  % Final    % Monocytes 06/09/2024 11  % Final    % Eosinophils 06/09/2024 2  % Final    % Basophils 06/09/2024 1  % Final    % Immature Granulocytes 06/09/2024 0  % Final    NRBCs per 100 WBC 06/09/2024 0  <1 /100 Final    Absolute Neutrophils 06/09/2024 3.9   1.6 - 8.3 10e3/uL Final    Absolute Lymphocytes 06/09/2024 1.6  0.8 - 5.3 10e3/uL Final    Absolute Monocytes 06/09/2024 0.7  0.0 - 1.3 10e3/uL Final    Absolute Eosinophils 06/09/2024 0.2  0.0 - 0.7 10e3/uL Final    Absolute Basophils 06/09/2024 0.0  0.0 - 0.2 10e3/uL Final    Absolute Immature Granulocytes 06/09/2024 0.0  <=0.4 10e3/uL Final    Absolute NRBCs 06/09/2024 0.0  10e3/uL Final         Phone call duration: 33 minutes  Signed Electronically by: Maria Elena Albarado MD

## 2024-06-13 ENCOUNTER — HOSPITAL ENCOUNTER (OUTPATIENT)
Facility: CLINIC | Age: 19
Discharge: HOME | End: 2024-06-13
Attending: INTERNAL MEDICINE | Admitting: INTERNAL MEDICINE
Payer: COMMERCIAL

## 2024-06-13 VITALS
SYSTOLIC BLOOD PRESSURE: 108 MMHG | HEIGHT: 63 IN | BODY MASS INDEX: 19.14 KG/M2 | HEART RATE: 73 BPM | OXYGEN SATURATION: 98 % | WEIGHT: 108 LBS | DIASTOLIC BLOOD PRESSURE: 73 MMHG | RESPIRATION RATE: 16 BRPM

## 2024-06-13 LAB — UPPER GI ENDOSCOPY: NORMAL

## 2024-06-13 PROCEDURE — 250N000011 HC RX IP 250 OP 636: Mod: JZ | Performed by: INTERNAL MEDICINE

## 2024-06-13 PROCEDURE — 250N000009 HC RX 250: Performed by: INTERNAL MEDICINE

## 2024-06-13 PROCEDURE — 88305 TISSUE EXAM BY PATHOLOGIST: CPT | Mod: 26 | Performed by: PATHOLOGY

## 2024-06-13 PROCEDURE — 43239 EGD BIOPSY SINGLE/MULTIPLE: CPT | Performed by: INTERNAL MEDICINE

## 2024-06-13 PROCEDURE — 88305 TISSUE EXAM BY PATHOLOGIST: CPT | Mod: TC | Performed by: INTERNAL MEDICINE

## 2024-06-13 PROCEDURE — 999N000099 HC STATISTIC MODERATE SEDATION < 10 MIN: Performed by: INTERNAL MEDICINE

## 2024-06-13 RX ORDER — ONDANSETRON 2 MG/ML
4 INJECTION INTRAMUSCULAR; INTRAVENOUS EVERY 6 HOURS PRN
Status: DISCONTINUED | OUTPATIENT
Start: 2024-06-13 | End: 2024-06-13 | Stop reason: HOSPADM

## 2024-06-13 RX ORDER — FENTANYL CITRATE 50 UG/ML
INJECTION, SOLUTION INTRAMUSCULAR; INTRAVENOUS PRN
Status: DISCONTINUED | OUTPATIENT
Start: 2024-06-13 | End: 2024-06-13 | Stop reason: HOSPADM

## 2024-06-13 RX ORDER — PROCHLORPERAZINE MALEATE 10 MG
10 TABLET ORAL EVERY 6 HOURS PRN
Status: DISCONTINUED | OUTPATIENT
Start: 2024-06-13 | End: 2024-06-13 | Stop reason: HOSPADM

## 2024-06-13 RX ORDER — NALOXONE HYDROCHLORIDE 0.4 MG/ML
0.2 INJECTION, SOLUTION INTRAMUSCULAR; INTRAVENOUS; SUBCUTANEOUS
Status: DISCONTINUED | OUTPATIENT
Start: 2024-06-13 | End: 2024-06-13 | Stop reason: HOSPADM

## 2024-06-13 RX ORDER — FLUMAZENIL 0.1 MG/ML
0.2 INJECTION, SOLUTION INTRAVENOUS
Status: DISCONTINUED | OUTPATIENT
Start: 2024-06-13 | End: 2024-06-13 | Stop reason: HOSPADM

## 2024-06-13 RX ORDER — NALOXONE HYDROCHLORIDE 0.4 MG/ML
0.4 INJECTION, SOLUTION INTRAMUSCULAR; INTRAVENOUS; SUBCUTANEOUS
Status: DISCONTINUED | OUTPATIENT
Start: 2024-06-13 | End: 2024-06-13 | Stop reason: HOSPADM

## 2024-06-13 RX ORDER — ONDANSETRON 4 MG/1
4 TABLET, ORALLY DISINTEGRATING ORAL EVERY 6 HOURS PRN
Status: DISCONTINUED | OUTPATIENT
Start: 2024-06-13 | End: 2024-06-13 | Stop reason: HOSPADM

## 2024-06-13 ASSESSMENT — ACTIVITIES OF DAILY LIVING (ADL): ADLS_ACUITY_SCORE: 35

## 2024-06-13 NOTE — TELEPHONE ENCOUNTER
Called [place] at [phone] and spoke with Jania   - Jania states that the patient is scheduled for her consult appointment on 6/19/2024   - Jania states that the patient is scheduled for her EGD appointment on 7/1/2024     Teena GARCIA RN   [facility]

## 2024-06-14 LAB
PATH REPORT.COMMENTS IMP SPEC: NORMAL
PATH REPORT.COMMENTS IMP SPEC: NORMAL
PATH REPORT.FINAL DX SPEC: NORMAL
PATH REPORT.GROSS SPEC: NORMAL
PATH REPORT.MICROSCOPIC SPEC OTHER STN: NORMAL
PATH REPORT.RELEVANT HX SPEC: NORMAL
PHOTO IMAGE: NORMAL

## 2024-06-17 ENCOUNTER — MYC MEDICAL ADVICE (OUTPATIENT)
Dept: PEDIATRICS | Facility: CLINIC | Age: 19
End: 2024-06-17
Payer: COMMERCIAL

## 2024-06-17 DIAGNOSIS — G44.52 NEW DAILY PERSISTENT HEADACHE: Primary | ICD-10-CM

## 2024-06-18 NOTE — TELEPHONE ENCOUNTER
See patient's MyChart message   - Patient states that she is in agreement with a referral to Medication Therapy Management (MTM)     - Pended a referral to Medication Therapy Management (MTM)     Dr. Albarado, please review the pended MTM referral and order as appropriate.   - Patient wanting to review her medication list and decrease the amount of medications that she is taking if able.     Teena GARCIA RN   [facility]

## 2024-06-19 ENCOUNTER — TRANSFERRED RECORDS (OUTPATIENT)
Dept: HEALTH INFORMATION MANAGEMENT | Facility: CLINIC | Age: 19
End: 2024-06-19
Payer: COMMERCIAL

## 2024-06-19 RX ORDER — NORTRIPTYLINE HCL 10 MG
10 CAPSULE ORAL AT BEDTIME
COMMUNITY
Start: 2024-06-19 | End: 2024-07-31

## 2024-06-19 NOTE — TELEPHONE ENCOUNTER
PCP: Please review mychart. Patient states [facility] switched her from amitriptyline to nortriptyline to see if this helps with GI Issues. Pended historical med. Please advise.  Thanks!  Michelle ROBLES RN, BSN

## 2024-06-23 ENCOUNTER — MYC MEDICAL ADVICE (OUTPATIENT)
Dept: PEDIATRICS | Facility: CLINIC | Age: 19
End: 2024-06-23
Payer: COMMERCIAL

## 2024-06-23 NOTE — PROGRESS NOTES
Medication Therapy Management (MTM) Encounter    ASSESSMENT:                            Medication Adherence/Access: No issues identified    Migraine: Patient has migraine with aura, appropriately not taking hormonal contraception.  Would consider alternative abortive therapy to rizatriptan due to partial effect, sumatriptan or eletriptan.  Additionally suggested to try nortriptyline as prescribed, although amitriptyline is the only TCA that has proven benefit for migraines per the American Academy of Neurology.  If this is ineffective, consider topiramate or propranolol - propranolol may also be beneficial if patient has POTS.    GERD/Nausea/Abdominal Swelling: Encouraged patient to utilize dicyclomine more frequently and avoid waiting until symptoms are very bad.  Patient needs further work-up for the etiology of this pain, continue to follow with [facility].      Anxiety: Stable.    PLAN:                            Migraine:   Try the nortriptyline for migraines.   To be considered by Dr. Albarado and neurology:  Try alternative triptan medication - sumatriptan and/or eletriptan as abortive therapy  Topiramate and/or propranolol for preventive therapy    Abdominal Pain:   Consider trying dicylomine more frequently if it's helpful - consider taking 4-7 days per week.  If not helpful, you can reduce the frequency again.      Addendum per Dr. Albarado: I usually start with zomig or maxalt and then try the other. Hopefully she won't need to go through too many to get something like nurtec approved. Thanks for your help.   Malathi Smith, PharmD, BCACP  Medication Therapy Management Provider, [facility]    Follow-up: Return if symptoms worsen or fail to improve.    SUBJECTIVE/OBJECTIVE:                          Jeny Curry is a 18 year old female seen for an initial visit. She was referred to me from Maria Elena Albarado MD.      Reason for visit: medication review, headaches.    Allergies/ADRs:  "Reviewed in chart  Past Medical History: Reviewed in chart  Tobacco: She reports that she has never smoked. She has never been exposed to tobacco smoke. She has never used smokeless tobacco.  Alcohol: not currently using    Medication Adherence/Access: no issues reported    Migraine:   Rizatriptan 5-10 mg at onset of headache - is out of this, sometimes helped with more dull headaches, but not for severe headaches - uses infrequently  Nortriptyline 10 mg at bedtime - has not tried yet  How often: daily, worse at night  Triggers: no triggers reported  Symptoms: sharper pain, sometimes tunnel vision, says her head \"tightens\" and mentions being able to see dark spots sometimes.    Has tried laying in a dark room or sleeping, but doesn't help very much.  Mentions possible POTS, frequent fainting spells    Past medications: amitriptyline (grogginess)  Follows with neurology (Dr. Gupta)      GERD /Nausea/Abdominal Swelling  Omeprazole 40 mg once daily  Ondansetron 4-8 mg ODT every 8 hours as needed  - not taking very often  Metoclopramide 5 mg as needed - not taking very often  Maalox 30 mL every 4 hours as needed  Dicyclomine 20 mg four times daily as needed - feels this has been the most helpful, only takes a few times per week when she feels the pain is getting really bad    Reports 2-3 bowel movements daily, even before taking senna and miralax.    Following Gio Virgen at [facility]  Pain worsens when she eats and that it swells up more.  Says it's always swollen, but worse with eating.  Nausea not really happening right now.       Mental Health   Anxiety  Escitalopram 5 mg once daily.   Patient reports no current medication side effects.  Patient reports symptoms are improved.  Reports this has helped with situational anxiety related to her other health concerns.       Today's Vitals: /70   Pulse 76   Wt 106 lb 12.8 oz (48.4 kg)   LMP 06/09/2024 (Exact Date)   SpO2 97%   BMI 18.92 kg/m  "   ----------------    I spent 30 minutes with this patient today. All changes were made via verbal approval with Maria Elena Albarado MD. A copy of the visit note was provided to the patient's provider(s).    A summary of these recommendations was given to the patient.    Sharon Zelaya PharmD  Pharmacy Resident  Pager #[phone]    Aniyah Curry was seen independently by Dr. Sharon Zelaya.  I have reviewed and agree with the resident note and plan of care.      Malathi Smith, PharmD, [facility]  Medication Therapy Management Provider, [facility]  Phone: [phone]       Medication Therapy Recommendations  Abdominal swelling    Current Medication: dicyclomine (BENTYL) 20 MG tablet   Rationale: Dose too low - Dosage too low - Effectiveness   Recommendation: Increase Frequency - dicyclomine 20 MG tablet   Status: Patient Agreed - Adherence/Education   Note: suggested take more frequently

## 2024-06-24 ENCOUNTER — OFFICE VISIT (OUTPATIENT)
Dept: PHARMACY | Facility: CLINIC | Age: 19
End: 2024-06-24
Attending: INTERNAL MEDICINE
Payer: COMMERCIAL

## 2024-06-24 VITALS
DIASTOLIC BLOOD PRESSURE: 70 MMHG | BODY MASS INDEX: 18.92 KG/M2 | WEIGHT: 106.8 LBS | OXYGEN SATURATION: 97 % | HEART RATE: 76 BPM | SYSTOLIC BLOOD PRESSURE: 110 MMHG

## 2024-06-24 DIAGNOSIS — G43.E11 INTRACTABLE CHRONIC MIGRAINE WITH AURA WITH STATUS MIGRAINOSUS: Primary | ICD-10-CM

## 2024-06-24 DIAGNOSIS — K21.00 GASTROESOPHAGEAL REFLUX DISEASE WITH ESOPHAGITIS WITHOUT HEMORRHAGE: ICD-10-CM

## 2024-06-24 DIAGNOSIS — R11.0 NAUSEA: ICD-10-CM

## 2024-06-24 DIAGNOSIS — R19.00 ABDOMINAL SWELLING: ICD-10-CM

## 2024-06-24 DIAGNOSIS — F41.9 ANXIETY: ICD-10-CM

## 2024-06-24 PROCEDURE — 99605 MTMS BY PHARM NP 15 MIN: CPT

## 2024-06-24 PROCEDURE — 99607 MTMS BY PHARM ADDL 15 MIN: CPT

## 2024-06-24 RX ORDER — SENNOSIDES 8.6 MG
1 TABLET ORAL AT BEDTIME
COMMUNITY
End: 2024-07-31

## 2024-06-24 RX ORDER — POLYETHYLENE GLYCOL 3350 17 G/17G
8-9 POWDER, FOR SOLUTION ORAL DAILY PRN
COMMUNITY

## 2024-06-24 NOTE — Clinical Note
Should we consider changing triptan medications now?  Only having partial response to rizatriptan.  Will likely need to try a few before trying other things (i.e. CGRP antagonists).  Let me know if you'd like me to send something to replace rizatriptan.  Thanks, Sharon Zelaya, PharmD Pharmacy Resident

## 2024-06-24 NOTE — PATIENT INSTRUCTIONS
"Recommendations from today's MTM visit:                                                    MTM (medication therapy management) is a service provided by a clinical pharmacist designed to help you get the most of out of your medicines.   Today we reviewed what your medicines are for, how to know if they are working, that your medicines are safe and how to make your medicine regimen as easy as possible.      Try the nortriptyline for migraines.  Will provide neurologist and Dr. Albarado with future recommendations.    Consider trying dicylomine more frequently if it's helpful - consider taking 4-7 days per week.  If not helpful, you can reduce the frequency again.      Follow-up: Return if symptoms worsen or fail to improve.    It was great speaking with you today.  I value your experience and would be very thankful for your time in providing feedback in our clinic survey. In the next few days, you may receive an email or text message from [employer] with a link to a survey related to your  clinical pharmacist.\"     To schedule another MTM appointment, please call the clinic directly or you may call the MTM scheduling line at [phone] or toll-free at [phone].     My Clinical Pharmacist's contact information:                                                      Please feel free to contact me with any questions or concerns you have.      Sharon Zelaya, PharmD  Pharmacy Resident   "

## 2024-06-24 NOTE — TELEPHONE ENCOUNTER
See the  message from pt. Last depo was administered by Ob/Gyn on 3/6/24(next inj due 5/22-6/19).    Called pt at [phone] to get details. Pt says that she is no longer on OCP & skipped the Depo inj because of her on-going FLORES. FLORES has been the same, not better or worse.     Has been using about 3-4 super tampons per day for the bleeding, finds blood clots on top of the tampons while removing them. Right now the big clots has been stopped. Has moderate abdominal cramping(not severe), but feeling weak lately. Denies dizziness, weakness in lower extremities, lightheadedness or dehydration sx's.     Advised pt to call her Ob/Gyn office to schedule an appointment asap to address her concern. She agrees to the plan.     Dr. Albarado - do you have any treatment options for pt while she is awaiting ob/gyn visit? Thanks.      levonorgest-eth estrad 91-Day (SEASONIQUE) 0.15-0.03 &0.01 MG tablet (Discontinued) 84 tablet 4 1/4/2024 3/13/2024 No   Sig - Route: Take 1 tablet by mouth daily - Oral     Rashida. M  Clinic RN  [facility]

## 2024-06-28 ENCOUNTER — TRANSFERRED RECORDS (OUTPATIENT)
Dept: HEALTH INFORMATION MANAGEMENT | Facility: CLINIC | Age: 19
End: 2024-06-28
Payer: COMMERCIAL

## 2024-07-03 RX ORDER — ZOLMITRIPTAN 2.5 MG/1
2.5 TABLET, ORALLY DISINTEGRATING ORAL
Qty: 20 TABLET | Refills: 1 | Status: SHIPPED | OUTPATIENT
Start: 2024-07-03 | End: 2024-07-31

## 2024-07-17 ENCOUNTER — MYC MEDICAL ADVICE (OUTPATIENT)
Dept: PEDIATRICS | Facility: CLINIC | Age: 19
End: 2024-07-17
Payer: COMMERCIAL

## 2024-07-17 NOTE — TELEPHONE ENCOUNTER
Please see patient MyChart message as update from patient regarding medications. Please advise if visit needed.     Gio HURST RN 7/17/2024 at 4:41 PM

## 2024-07-19 NOTE — TELEPHONE ENCOUNTER
20 minute virtual should be fine for forms. She should just know we won't have as much time for detailed review of her symptoms.   Maria Elena Albarado M.D.

## 2024-07-25 ENCOUNTER — VIRTUAL VISIT (OUTPATIENT)
Dept: CARDIOLOGY | Facility: CLINIC | Age: 19
End: 2024-07-25
Attending: INTERNAL MEDICINE
Payer: COMMERCIAL

## 2024-07-25 VITALS — HEIGHT: 63 IN | WEIGHT: 105 LBS | BODY MASS INDEX: 18.61 KG/M2

## 2024-07-25 DIAGNOSIS — I95.1 ORTHOSTATIC HYPOTENSION: ICD-10-CM

## 2024-07-25 DIAGNOSIS — R55 SYNCOPE AND COLLAPSE: Primary | ICD-10-CM

## 2024-07-25 PROCEDURE — 99214 OFFICE O/P EST MOD 30 MIN: CPT | Mod: 95 | Performed by: INTERNAL MEDICINE

## 2024-07-25 ASSESSMENT — PAIN SCALES - GENERAL: PAINLEVEL: NO PAIN (0)

## 2024-07-25 NOTE — NURSING NOTE
Current patient location:  in vehicle     Is the patient currently in the state of [place]? YES    Visit mode:VIDEO    If the visit is dropped, the patient can be reconnected by: VIDEO VISIT: Text to cell phone:   Telephone Information:   Mobile [phone]       Will anyone else be joining the visit? NO  (If patient encounters technical issues they should call [phone]    How would you like to obtain your AVS? MyChart    Are changes needed to the allergy or medication list? No    Patient denies any changes and states that all information remains accurate since last reviewed/verified.     Are refills needed on medications prescribed by this physician? NO    No other vitals to report today    Reason for visit: IGOR Pike MA VVF

## 2024-07-25 NOTE — PROGRESS NOTES
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 Monet.  Patient is being seen today in follow-up after autonomic/tilt table testing a month ago.  In essence findings revealed postural hypotension of the immediate form and also susceptibility to vasovagal syncope.  Treatment was focused in discussion with Jeny and her parents on increased salt and volume as a foundation therapy.  Medications were not prescribed in the hope that her symptoms would be adequately suppressed with conventional hydration and increased electrolyte intake.    At today's visit patient indicates that she is been feeling much better.  She does still have some lightheadedness but no faints and her response to getting up from a chair or sofa is much less problematic.  Immediate hypotension seems to have diminished substantially.    We did discuss the importance of electrolytes and hydration again today and in the absence of any significant symptom recurrence we will just plan to touch base again in about 6 months time.Jeny voiced understanding and agreement.    PAST MEDICAL HISTORY:  Past Medical History:   Diagnosis Date    RetroCerebellar arachnoid cyst     Seasonal allergic rhinitis        CURRENT MEDICATIONS:  Current Outpatient Medications   Medication Sig Dispense Refill    dicyclomine (BENTYL) 20 MG tablet Take 1 tablet (20 mg) by mouth 4 times daily as needed (abdominal pain) 60 tablet 1    escitalopram (LEXAPRO) 5 MG tablet Take 1 tablet (5 mg) by mouth daily 30 tablet 1    metoclopramide (REGLAN) 5 MG tablet Take 5 mg by mouth as needed      nortriptyline (PAMELOR) 10 MG capsule Take 1 capsule (10 mg) by mouth at bedtime      ondansetron (ZOFRAN ODT) 4 MG ODT tab Take 1-2 tablets (4-8 mg) by mouth every 8 hours as needed for nausea 20 tablet 1    polyethylene glycol (MIRALAX) 17 g packet Take 8-9 g by mouth daily as needed for  constipation      sennosides (SENOKOT) 8.6 MG tablet Take 1 tablet by mouth at bedtime      ZOLMitriptan (ZOMIG-ZMT) 2.5 MG ODT Take 1 tablet (2.5 mg) by mouth at onset of headache for migraine May repeat in 2 hours. Max 4 tablets/24 hours. 20 tablet 1       PAST SURGICAL HISTORY:  Past Surgical History:   Procedure Laterality Date    APPENDECTOMY      EP STUDY TILT TABLE N/A 5/8/2024    Procedure: Tilt Table Study;  Surgeon: Issac Jiménez MD;  Location:  HEART CARDIAC CATH LAB    ESOPHAGOSCOPY, GASTROSCOPY, DUODENOSCOPY (EGD), COMBINED N/A 6/13/2024    Procedure: Esophagoscopy, gastroscopy, duodenoscopy (EGD), combined with biopsies with biopsy forceps;  Surgeon: Berhane Eastman MD;  Location:  GI    Labral tear repair Left 01/2022       ALLERGIES:     Allergies   Allergen Reactions    No Known Drug Allergy        FAMILY HISTORY:  Family History   Problem Relation Age of Onset    Hypothyroidism Mother     Unknown/Adopted Mother     Anesthesia Reaction Mother     Thyroid Disease Mother         Hashimoto's    Hypothyroidism Father     Thyroid Disease Father         Hypothyroidism and vitiligo    Hypothyroidism Maternal Grandmother     Thyroid Disease Maternal Grandmother         Hypothyroidism    Cerebrovascular Disease Maternal Grandfather         2014    Prostate Cancer Maternal Grandfather     Hypothyroidism Paternal Grandmother     Thyroid Disease Paternal Grandmother         Hyposthyroidism    Coronary Artery Disease Paternal Grandfather     Hypertension Paternal Grandfather     Anxiety Disorder Paternal Grandfather     Gastrointestinal Disease Brother 5        celiac disease    Hypothyroidism Brother     Asthma Brother     Genetic Disorder Brother         Celiac Disease    Thyroid Disease Brother         Autoimmune Hypothyroidism    Genetic Disorder Cousin         JRA    Breast Cancer Other         Aunt    Colon Cancer No family hx of      SOCIAL HISTORY:  Social History     Tobacco Use    Smoking  "status: Never     Passive exposure: Never    Smokeless tobacco: Never   Vaping Use    Vaping status: Never Used   Substance Use Topics    Alcohol use: No    Drug use: No       ROS:   Constitutional: No fever, chills, or sweats. Weight stable.   ENT: No visual disturbance, ear ache, epistaxis, sore throat.   Cardiovascular: As per HPI.   Respiratory: No cough, hemoptysis.    GI: No nausea, vomiting, hematemesis, melena, or hematochezia.   : No hematuria.   Integument: Negative.   Psychiatric: Negative.   Hematologic:  Easy bruising, no easy bleeding.  Neuro: Negative.   Endocrinology: No significant heat or cold intolerance   Musculoskeletal: No myalgia.    Exam:  Ht 1.6 m (5' 3\")   Wt 47.6 kg (105 lb)   LMP 06/09/2024 (Exact Date)   BMI 18.60 kg/m    GENERAL APPEARANCE: healthy, alert and no distress  HEENT: no icterus, no central cyanosis  NECK: JVP not elevated  RESPIRATORY:  no rales, rhonchi or wheezes, no use of accessory muscles, no retractions, respirations are unlabored, normal respiratory rate  NEURO: alert and oriented to person/place/time, normal speech, gait and affect  SKIN: no ecchymoses, no rashes    Labs:  CBC RESULTS:   Lab Results   Component Value Date    WBC 6.5 06/09/2024    WBC 6.2 04/13/2014    RBC 4.18 06/09/2024    RBC 4.32 05/07/2013    HGB 13.2 06/09/2024    HGB 13.2 05/07/2013    HCT 38.8 06/09/2024    HCT 38.0 05/07/2013    MCV 93 06/09/2024    MCV 88 05/07/2013    MCH 31.6 06/09/2024    MCH 30.6 05/07/2013    MCHC 34.0 06/09/2024    MCHC 34.7 05/07/2013    RDW 12.8 06/09/2024    RDW 11.9 05/07/2013     06/09/2024     05/07/2013       BMP RESULTS:  Lab Results   Component Value Date     06/09/2024     07/03/2012    POTASSIUM 3.6 06/09/2024    POTASSIUM 3.4 12/30/2021    POTASSIUM 4.0 07/03/2012    CHLORIDE 105 06/09/2024    CHLORIDE 105 12/30/2021    CHLORIDE 104 07/03/2012    CO2 23 06/09/2024    CO2 28 12/30/2021    CO2 26 07/03/2012    ANIONGAP 12 " "06/09/2024    ANIONGAP 5 12/30/2021    ANIONGAP 9 07/03/2012    GLC 96 06/09/2024    GLC 93 12/30/2021    GLC 94 07/03/2012    BUN 15.0 06/09/2024    BUN 10 12/30/2021    BUN 14 07/03/2012    CR 0.73 06/09/2024    CR 0.33 07/03/2012    GFRESTIMATED >90 06/09/2024    GFRESTIMATED GFR not calculated, patient <16 years old. 07/03/2012    GFRESTBLACK GFR not calculated, patient <16 years old. 07/03/2012    CHRISSIE 9.4 06/09/2024    CHRISSIE 9.5 07/03/2012        INR RESULTS:  No results found for: \"INR\"    Procedures:  PULMONARY FUNCTION TESTS:        No data to display                  ECHOCARDIOGRAM:   No results found for this or any previous visit (from the past 8760 hour(s)).      Assessment and Plan:   1.  Immediate/initial orthostatic hypotension--under better control with salt and electrolyte fluid  2.  Susceptibility to vasovagal syncope--better controlled as with #1  3.  No new symptoms reported today    Plan  1.  Continue current treatment strategy which is primarily increased dietary salt electrolyte fluids on a daily basis.  Target is 2 to 3 g of salt per day and 60 ounces of electrolyte fluid  2.  Patient to contact us if symptoms become again problematic otherwise schedule video visit about 6 months    Total elapsed time today with chart review, clinic visit and documentation 30 minutes    Video on 11: 02; off 11: 12  Platform Doximity  Patient at home in car; clinic [facility] heart    I very much appreciated the opportunity to see and assess Aniyah Curry in the clinic today . Please do not hesitate to contact my office if you have any questions or concerns.      Issac Jiménez MD  Cardiac Arrhythmia Service  [facility]  [phone]       CC  NADEEM BENSON    "

## 2024-07-25 NOTE — PATIENT INSTRUCTIONS
You were seen in the Electrophysiology Clinic today by: Dr Jiménez    Plan:     Follow up Visit: 6 months      If you have further questions, please utilize [email or web address] to contact us.     Your Care Team:    EP Cardiology   Telephone Number     Nurse Line  Hilda Batres, RN   Concepcion Berg, LENIN Gabriel, LENIN   [phone]     For scheduling appointments:   Therese   [phone]   For procedure scheduling:    Denise Francis     [phone]   For the Device Clinic (Pacemakers, ICDs, Loop Recorders)    During business hours: [phone]  After business hours:   [phone]- select option 4 and ask for job code [id number].       On-call cardiologist for after hours or on weekends:   [phone], option #4, and ask to speak to the on-call cardiologist.     Cardiovascular Clinic:   [address] [place] [address]. [place] [address]      As always, Thank you for trusting us with your health care needs!

## 2024-07-25 NOTE — PROGRESS NOTES
"5/7/2024  Neurosurgery Clinic Visit    Chief Complaint: Chronic headaches    History of present illness:  Aniyah Curry is a 18 year old female presenting no notable past medical history presenting for further evaluation of a retrocerebellar arachnoid cyst diagnosed on 4/22/2024 during a headache workup.  Notably, the patient was recently in the emergency department (5/5/2024 or significant headaches, vision changes describing black spots worsening with headaches).  During this appointment, she also had significant abdominal pain.     Today the patient reports that she has ongoing headaches, including today. She has also dropped out of school due to difficulty concentrating. There's concern for POTS (postural orthostatic tachycardia syndrome). She also intermittently has tremors.     Physical exam:   /71 (BP Location: Right arm, Patient Position: Sitting, Cuff Size: Adult Regular)   Pulse 96   Ht 1.6 m (5' 3\")   Wt 50.2 kg (110 lb 11.2 oz)   LMP 04/10/2024 (Exact Date)   SpO2 98%   BMI 19.61 kg/m    General: Awake and alert and in no acute distress.  Pulm: Breathing comfortably on room air  Neurologic:  - Face symmetric  - Pupils reactive, extraocular movements intact  - Full strength in upper and lower extremities  - Intact sensation to all extremities  - No clonus, patellar and bicep reflexes 2+  - Intact coordination    Imaging:  All previous imaging pertinent to this encounter reviewed.  Pertinent reads:  Recent MRI is reviewed on 4/22/2024.  There is evidence of a retrocerebellar arachnoid cyst with minimal mass effect in the vermis measuring 1.4 x 1.3 x 3.5 cm.  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  A venogram was performed to assess impact on sinuses, demonstrating no sinus thrombosis or occlusion.    Assessment/Plan:  Aniyah Curry is a 18 year old female presenting for evaluation of an arachnoid cyst in the retrocerebellar space observed after ongoing headaches. " Unfortunately, this is a benign finding and at this time, does not explain the symptoms the patient has. The patient has an intact neurological exam currently.  - Agree with follow-up with neurology for evaluation of headaches  - Follow-up in 1 year with repeat MRI at that time to assess interval changes.    Patient seen and discussed with Dr. Citlali Willson MD.  Approximately 45 minutes were spent in chart and image review, evaluation of the patient and assessment of next steps.    Melvin Umaña MD  PGY-1, Department of Neurosurgery  [facility]          REVIEWED ASSOCIATED CLINICAL DATA AND HISTORY FOUND IN THE MEDICAL RECORD:  Past Medical History:   Past Medical History:   Diagnosis Date    RetroCerebellar arachnoid cyst     Seasonal allergic rhinitis        Surgical History:   Past Surgical History:   Procedure Laterality Date    APPENDECTOMY      Labral tear repair Left 01/2022       Social history:   Social History     Tobacco Use    Smoking status: Never     Passive exposure: Never    Smokeless tobacco: Never   Vaping Use    Vaping status: Never Used   Substance Use Topics    Alcohol use: No    Drug use: No       Family history:   Family History   Problem Relation Age of Onset    Hypothyroidism Mother     Unknown/Adopted Mother     Anesthesia Reaction Mother     Thyroid Disease Mother         Hashimoto's    Hypothyroidism Father     Thyroid Disease Father         Hypothyroidism and vitiligo    Hypothyroidism Maternal Grandmother     Thyroid Disease Maternal Grandmother         Hypothyroidism    Cerebrovascular Disease Maternal Grandfather         2014    Prostate Cancer Maternal Grandfather     Hypothyroidism Paternal Grandmother     Thyroid Disease Paternal Grandmother         Hyposthyroidism    Gastrointestinal Disease Brother 5        celiac disease    Hypothyroidism Brother     Coronary Artery Disease Paternal Grandfather     Hypertension Paternal Grandfather     Anxiety Disorder  Paternal Grandfather     Breast Cancer Other         Aunt    Asthma Brother     Genetic Disorder Brother         Celiac Disease    Thyroid Disease Brother         Autoimmune Hypothyroidism    Genetic Disorder Cousin         JRA       Medications:  Current Outpatient Medications   Medication Sig Dispense Refill    metoclopramide (REGLAN) 5 MG tablet Take 5 mg by mouth as needed      omeprazole (PRILOSEC) 20 MG DR capsule Take 1 capsule (20 mg) by mouth daily 30 capsule 0     No current facility-administered medications for this visit.       Allergies:     Allergies   Allergen Reactions    No Known Drug Allergy      I have seen this patient with the resident and formulated a plan and agree with this note.  AMP   Patient

## 2024-07-25 NOTE — LETTER
7/25/2024      RE: Aniyah Curry  [address] [address]       Dear Colleague,    Thank you for the opportunity to participate in the care of your patient, Aniyah Curry, at the [facility]. Please see a copy of my visit note below.    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 Monet.  Patient is being seen today in follow-up after autonomic/tilt table testing a month ago.  In essence findings revealed postural hypotension of the immediate form and also susceptibility to vasovagal syncope.  Treatment was focused in discussion with Jeny and her parents on increased salt and volume as a foundation therapy.  Medications were not prescribed in the hope that her symptoms would be adequately suppressed with conventional hydration and increased electrolyte intake.    At today's visit patient indicates that she is been feeling much better.  She does still have some lightheadedness but no faints and her response to getting up from a chair or sofa is much less problematic.  Immediate hypotension seems to have diminished substantially.    We did discuss the importance of electrolytes and hydration again today and in the absence of any significant symptom recurrence we will just plan to touch base again in about 6 months time.Jeny voiced understanding and agreement.    PAST MEDICAL HISTORY:  Past Medical History:   Diagnosis Date     RetroCerebellar arachnoid cyst      Seasonal allergic rhinitis        CURRENT MEDICATIONS:  Current Outpatient Medications   Medication Sig Dispense Refill     dicyclomine (BENTYL) 20 MG tablet Take 1 tablet (20 mg) by mouth 4 times daily as needed (abdominal pain) 60 tablet 1     escitalopram (LEXAPRO) 5 MG tablet Take 1 tablet (5 mg) by mouth daily 30 tablet 1     metoclopramide  (REGLAN) 5 MG tablet Take 5 mg by mouth as needed       nortriptyline (PAMELOR) 10 MG capsule Take 1 capsule (10 mg) by mouth at bedtime       ondansetron (ZOFRAN ODT) 4 MG ODT tab Take 1-2 tablets (4-8 mg) by mouth every 8 hours as needed for nausea 20 tablet 1     polyethylene glycol (MIRALAX) 17 g packet Take 8-9 g by mouth daily as needed for constipation       sennosides (SENOKOT) 8.6 MG tablet Take 1 tablet by mouth at bedtime       ZOLMitriptan (ZOMIG-ZMT) 2.5 MG ODT Take 1 tablet (2.5 mg) by mouth at onset of headache for migraine May repeat in 2 hours. Max 4 tablets/24 hours. 20 tablet 1       PAST SURGICAL HISTORY:  Past Surgical History:   Procedure Laterality Date     APPENDECTOMY       EP STUDY TILT TABLE N/A 5/8/2024    Procedure: Tilt Table Study;  Surgeon: Issac Jiménez MD;  Location:  HEART CARDIAC CATH LAB     ESOPHAGOSCOPY, GASTROSCOPY, DUODENOSCOPY (EGD), COMBINED N/A 6/13/2024    Procedure: Esophagoscopy, gastroscopy, duodenoscopy (EGD), combined with biopsies with biopsy forceps;  Surgeon: Berhane Eastman MD;  Location:  GI     Labral tear repair Left 01/2022       ALLERGIES:     Allergies   Allergen Reactions     No Known Drug Allergy        FAMILY HISTORY:  Family History   Problem Relation Age of Onset     Hypothyroidism Mother      Unknown/Adopted Mother      Anesthesia Reaction Mother      Thyroid Disease Mother         Hashimoto's     Hypothyroidism Father      Thyroid Disease Father         Hypothyroidism and vitiligo     Hypothyroidism Maternal Grandmother      Thyroid Disease Maternal Grandmother         Hypothyroidism     Cerebrovascular Disease Maternal Grandfather         2014     Prostate Cancer Maternal Grandfather      Hypothyroidism Paternal Grandmother      Thyroid Disease Paternal Grandmother         Hyposthyroidism     Coronary Artery Disease Paternal Grandfather      Hypertension Paternal Grandfather      Anxiety Disorder Paternal Grandfather       "Gastrointestinal Disease Brother 5        celiac disease     Hypothyroidism Brother      Asthma Brother      Genetic Disorder Brother         Celiac Disease     Thyroid Disease Brother         Autoimmune Hypothyroidism     Genetic Disorder Cousin         JRA     Breast Cancer Other         Aunt     Colon Cancer No family hx of      SOCIAL HISTORY:  Social History     Tobacco Use     Smoking status: Never     Passive exposure: Never     Smokeless tobacco: Never   Vaping Use     Vaping status: Never Used   Substance Use Topics     Alcohol use: No     Drug use: No       ROS:   Constitutional: No fever, chills, or sweats. Weight stable.   ENT: No visual disturbance, ear ache, epistaxis, sore throat.   Cardiovascular: As per HPI.   Respiratory: No cough, hemoptysis.    GI: No nausea, vomiting, hematemesis, melena, or hematochezia.   : No hematuria.   Integument: Negative.   Psychiatric: Negative.   Hematologic:  Easy bruising, no easy bleeding.  Neuro: Negative.   Endocrinology: No significant heat or cold intolerance   Musculoskeletal: No myalgia.    Exam:  Ht 1.6 m (5' 3\")   Wt 47.6 kg (105 lb)   LMP 06/09/2024 (Exact Date)   BMI 18.60 kg/m    GENERAL APPEARANCE: healthy, alert and no distress  HEENT: no icterus, no central cyanosis  NECK: JVP not elevated  RESPIRATORY:  no rales, rhonchi or wheezes, no use of accessory muscles, no retractions, respirations are unlabored, normal respiratory rate  NEURO: alert and oriented to person/place/time, normal speech, gait and affect  SKIN: no ecchymoses, no rashes    Labs:  CBC RESULTS:   Lab Results   Component Value Date    WBC 6.5 06/09/2024    WBC 6.2 04/13/2014    RBC 4.18 06/09/2024    RBC 4.32 05/07/2013    HGB 13.2 06/09/2024    HGB 13.2 05/07/2013    HCT 38.8 06/09/2024    HCT 38.0 05/07/2013    MCV 93 06/09/2024    MCV 88 05/07/2013    MCH 31.6 06/09/2024    MCH 30.6 05/07/2013    MCHC 34.0 06/09/2024    MCHC 34.7 05/07/2013    RDW 12.8 06/09/2024    RDW 11.9 " "05/07/2013     06/09/2024     05/07/2013       BMP RESULTS:  Lab Results   Component Value Date     06/09/2024     07/03/2012    POTASSIUM 3.6 06/09/2024    POTASSIUM 3.4 12/30/2021    POTASSIUM 4.0 07/03/2012    CHLORIDE 105 06/09/2024    CHLORIDE 105 12/30/2021    CHLORIDE 104 07/03/2012    CO2 23 06/09/2024    CO2 28 12/30/2021    CO2 26 07/03/2012    ANIONGAP 12 06/09/2024    ANIONGAP 5 12/30/2021    ANIONGAP 9 07/03/2012    GLC 96 06/09/2024    GLC 93 12/30/2021    GLC 94 07/03/2012    BUN 15.0 06/09/2024    BUN 10 12/30/2021    BUN 14 07/03/2012    CR 0.73 06/09/2024    CR 0.33 07/03/2012    GFRESTIMATED >90 06/09/2024    GFRESTIMATED GFR not calculated, patient <16 years old. 07/03/2012    GFRESTBLACK GFR not calculated, patient <16 years old. 07/03/2012    CHRISSIE 9.4 06/09/2024    CHRISSIE 9.5 07/03/2012        INR RESULTS:  No results found for: \"INR\"    Procedures:  PULMONARY FUNCTION TESTS:        No data to display                  ECHOCARDIOGRAM:   No results found for this or any previous visit (from the past 8760 hour(s)).      Assessment and Plan:   1.  Immediate/initial orthostatic hypotension--under better control with salt and electrolyte fluid  2.  Susceptibility to vasovagal syncope--better controlled as with #1  3.  No new symptoms reported today    Plan  1.  Continue current treatment strategy which is primarily increased dietary salt electrolyte fluids on a daily basis.  Target is 2 to 3 g of salt per day and 60 ounces of electrolyte fluid  2.  Patient to contact us if symptoms become again problematic otherwise schedule video visit about 6 months    Total elapsed time today with chart review, clinic visit and documentation 30 minutes    Video on 11: 02; off 11: 12  Platform Doximity  Patient at home in car; clinic [facility] heart    I very much appreciated the opportunity to see and assess Aniyah Curry in the clinic today . Please do not hesitate to contact my office if you " have any questions or concerns.      Issac Jiménez MD  Cardiac Arrhythmia Service  [facility]  [phone]       CC  NADEEM BENSON      Please do not hesitate to contact me if you have any questions/concerns.     Sincerely,     Issac Jiménez MD

## 2024-07-26 NOTE — TELEPHONE ENCOUNTER
Printed forms and placed in providers inbasket.     Appt scheduled for 7/31.    Anay VERMA, - [facility]  [facility]  [facility]

## 2024-07-31 ENCOUNTER — VIRTUAL VISIT (OUTPATIENT)
Dept: PEDIATRICS | Facility: CLINIC | Age: 19
End: 2024-07-31
Payer: COMMERCIAL

## 2024-07-31 DIAGNOSIS — F43.23 SITUATIONAL MIXED ANXIETY AND DEPRESSIVE DISORDER: ICD-10-CM

## 2024-07-31 DIAGNOSIS — G43.911 INTRACTABLE MIGRAINE WITH STATUS MIGRAINOSUS, UNSPECIFIED MIGRAINE TYPE: ICD-10-CM

## 2024-07-31 DIAGNOSIS — I95.1 POSTURAL HYPOTENSION: Primary | ICD-10-CM

## 2024-07-31 DIAGNOSIS — R10.12 LUQ ABDOMINAL PAIN: ICD-10-CM

## 2024-07-31 PROCEDURE — 99214 OFFICE O/P EST MOD 30 MIN: CPT | Mod: 95 | Performed by: INTERNAL MEDICINE

## 2024-07-31 PROCEDURE — G2211 COMPLEX E/M VISIT ADD ON: HCPCS | Mod: 95 | Performed by: INTERNAL MEDICINE

## 2024-07-31 RX ORDER — ESCITALOPRAM OXALATE 5 MG/1
7.5 TABLET ORAL DAILY
Qty: 135 TABLET | Refills: 1 | Status: SHIPPED | OUTPATIENT
Start: 2024-07-31

## 2024-07-31 RX ORDER — NORETHINDRONE 0.35 MG/1
1 TABLET ORAL
COMMUNITY
Start: 2024-07-01

## 2024-07-31 RX ORDER — AMITRIPTYLINE HYDROCHLORIDE 10 MG/1
10 TABLET ORAL AT BEDTIME
COMMUNITY
Start: 2024-06-19 | End: 2024-07-31

## 2024-07-31 RX ORDER — SUMATRIPTAN 50 MG/1
50 TABLET, FILM COATED ORAL
COMMUNITY
Start: 2024-06-28 | End: 2024-07-31

## 2024-07-31 NOTE — PATIENT INSTRUCTIONS
Consider re-trying nortriptyline but take with supper. Maybe try this before school starts up and  you may need to study in evening.     You can take bentyl before a meal if you are feeling like that meal may trigger abdominal cramping.

## 2024-07-31 NOTE — PROGRESS NOTES
Jeny is a 18 year old who is being evaluated via a billable video visit.    How would you like to obtain your AVS? MyChart  If the video visit is dropped, the invitation should be resent by: Send to e-mail at: [email or web address]  Will anyone else be joining your video visit? No      Assessment & Plan     Postural hypotension  Doing better wit high salt diet. Some dizziness but no presyncope. Accommodations paperwork written for school.    Situational mixed anxiety and depressive disorder  Would like to increase to 7.5 mg.  - escitalopram (LEXAPRO) 5 MG tablet; Take 1.5 tablets (7.5 mg) by mouth daily    LUQ abdominal pain  Improved. See patient instructions section.    Intractable migraine with status migrainosus, unspecified migraine type  Also improved. Not taking imitrex or zomig. Continues to follow with neurology.        The longitudinal plan of care for the diagnosis(es)/condition(s) as documented were addressed during this visit. Due to the added complexity in care, I will continue to support Jeny in the subsequent management and with ongoing continuity of care.    Patient Instructions   Consider re-trying nortriptyline but take with supper. Maybe try this before school starts up and  you may need to study in evening.     You can take bentyl before a meal if you are feeling like that meal may trigger abdominal cramping.    Subjective   Jeny is a 18 year old, presenting for the following health issues:  Forms        7/31/2024     7:29 AM   Additional Questions   Roomed by AL Christian   Accompanied by no         7/31/2024   Forms   Any forms needing to be completed Yes          7/31/2024     7:29 AM   Patient Reported Additional Medications   Patient reports taking the following new medications progestrone     History of Present Illness       Reason for visit:  Accomodation form    She eats 4 or more servings of fruits and vegetables daily.She consumes 1 sweetened beverage(s) daily.She exercises  "with enough effort to increase her heart rate 60 or more minutes per day.  She exercises with enough effort to increase her heart rate 5 days per week.   She is taking medications regularly.     Feeling better about starting school this year. Still gets really bad headaches.    Still gets abdominal pain but not as severe.    On progesterone only pill. Did have one period that was super heavy and taking lots of advil, napping. Only lasted 3 days.           Review of Systems  Constitutional, HEENT, cardiovascular, pulmonary, gi and gu systems are negative, except as otherwise noted.      Objective    Vitals - Patient Reported  Weight (Patient Reported): 46.7 kg (103 lb)  Height (Patient Reported): 160 cm (5' 3\")  BMI (Based on Pt Reported Ht/Wt): 18.25  Pain Score: No Pain (0)        Physical Exam   GENERAL: alert and no distress  EYES: Eyes grossly normal to inspection.  No discharge or erythema, or obvious scleral/conjunctival abnormalities.  RESP: No audible wheeze, cough, or visible cyanosis.    SKIN: Visible skin clear. No significant rash, abnormal pigmentation or lesions.  NEURO: Cranial nerves grossly intact.  Mentation and speech appropriate for age.  PSYCH: Appropriate affect, tone, and pace of words        Video-Visit Details    Type of service:  Video Visit   Originating Location (pt. Location): Home  Distant Location (provider location):  On-site  Platform used for Video Visit: Estela  Signed Electronically by: Maria Elena Albarado MD    "

## 2024-08-22 ENCOUNTER — TELEPHONE (OUTPATIENT)
Dept: NEUROSURGERY | Facility: CLINIC | Age: 19
End: 2024-08-22
Payer: COMMERCIAL

## 2024-08-28 ENCOUNTER — MYC MEDICAL ADVICE (OUTPATIENT)
Dept: PEDIATRICS | Facility: CLINIC | Age: 19
End: 2024-08-28
Payer: COMMERCIAL

## 2024-08-28 DIAGNOSIS — G93.0 SUBARACHNOID CYST: Primary | ICD-10-CM

## 2024-08-28 DIAGNOSIS — G44.52 NEW DAILY PERSISTENT HEADACHE: ICD-10-CM

## 2024-08-28 DIAGNOSIS — E34.8 PINEAL GLAND CYST: ICD-10-CM

## 2024-08-29 NOTE — TELEPHONE ENCOUNTER
"See patient's [employer] message   - Patient requesting a referral to see Dr. Louie at [facility] regarding a second opinion on her headaches   - Patient states that she experienced a headache earlier this week and was unable to take her medication as she was not at home; states that the pain worsened   - Patient states that she has noted more abdominal swelling and pain over the past couple of weeks   - Patient requesting a second opinion on her abdominal swelling and headahces     Upon chart review:   - Patient had an Esophagoscopy, gastroscopy, duodenoscopy (EGD), combined with biopsies with biopsy forceps completed on 6/13/2024   - 6/19/2024 [facility] office visit note states: \"patient has had imaging, blood tests, and even upper endoscopy with biopsies of the stomach and small bowel that have all come back negative, ruling out structural disease\"   - Patient currently prescribed dicyclomine (BENTYL) 20 MG tablet for abdominal pain and swelling     Dr. Albarado, please review and advise.     Teena GARCIA RN   [facility]   "

## 2024-08-30 NOTE — TELEPHONE ENCOUNTER
Maria Elena Albarado MD  [facility] - Primary Care2 minutes ago (10:57 AM)     EM  I'm OK with a referral anywhere she would like to go. She has had a difficult time. Just need to know what specialty and group Dr. Louie is with. There is a neurosurgeon, Dr. Louie who is outstanding. I know him from training. Just make sure we mention to her she needs to double check he is in her network. If not, I'll still do referral but she just needs to find out what an out of network referral looks like with her insurance.  OK to sign referral and close enc.  Maria Elena Albarado M.D.     Sent a [employer] message to the patient   - Informed the patient of Dr. Albarado's comments/recommendations   - Awaiting a response from the patient at this time     Teena GARCIA RN   [facility]

## 2024-09-03 NOTE — TELEPHONE ENCOUNTER
Maria Elena Albarado MD  [facility] - Primary Care4 days ago     EM  Please assist. Need to confirm name of group he is with, as [facility] is not an option.  Then can put in the referral and sign. I can cosign. Make sure they know to confirm he is covered before they schedule an appointment.  Maria Elena Albarado M.D.     Sent a [employer] message to the patient   - Informed the patient of Dr. Albarado's comments/recommendations   - Awaiting a response from the patient at this time with additional information for the referral     Teena GARCIA RN   [facility]

## 2024-09-04 ENCOUNTER — NURSE TRIAGE (OUTPATIENT)
Dept: PEDIATRICS | Facility: CLINIC | Age: 19
End: 2024-09-04
Payer: COMMERCIAL

## 2024-09-04 NOTE — TELEPHONE ENCOUNTER
Patient provided number to fax - [phone].  Faxed referral to [facility].  Replied to Gustavo.      Anay VERMA, - [facility]  Primary Care- [facility]

## 2024-09-04 NOTE — TELEPHONE ENCOUNTER
Provider Recommendation Follow Up:   Reached patient. Informed of provider's recommendations. Patient denied back pain shooting down legs or other concerns. Instructed patient to call [phone] for a return of fever, if the back pain worsens or doesn't go away, or if she starts soaking a tampon an hour. Patient verbalized understanding and agrees with the plan.     Teena Capellan RN

## 2024-09-04 NOTE — TELEPHONE ENCOUNTER
Nurse Triage SBAR    Is this a 2nd Level Triage? YES, LICENSED PRACTITIONER REVIEW IS REQUIRED    Situation: patient sent MyChart regarding brown spotting between periods and low back pain    Background: Patient started taking JENCYCLA 0.35 MG tablet daily in July. Denies missing a day. States she has had spotting between periods on a different birth control. States this month she has concurrent low back pain with spotting starting 9/2/204. Last period was 8/24-8/28, states periods are regular but sometimes come every 2 weeks. Reports taking tylenol and ibuprofen. Patient reports she spoke with a provider about the spotting previously and was advised to wait a few months to see if it resolved. Denies pregnancy or being on blood thinner and other hormonal medications. Side effect of jencyla is spotting between periods. Denies recent surgery or injury to the area. Denies blood clots or tissue being passed.    Assessment: Patient states the bleeding is brown and she is filling a regular tampon every 1-2 hours. Reports low back pain is 6/10 and constant. Reported nausea was only present on 9/2 and resolved without medication. Reported fever only on 9/2, was 101.4 but has since resolved.    Protocol Recommended Disposition:   No disposition on file.    Recommendation: patient requesting provider input, states doesn't wish to be seen in clinic for appointment at this time unless provider advises an appointment, States she is in [place] for school but could make an afternoon appointment. Writer will huddle with provider.     Dr. Albarado please review and advise, would you like patient to monitor, check iron labs, or be seen?    Routed to provider    Does the patient meet one of the following criteria for ADS visit consideration? 16+ years old, with an [facility] PCP     TIP  Providers, please consider if this condition is appropriate for management at one of our Acute and Diagnostic Services sites.     If patient is a good  candidate, please use dotphrase <dot>triageresponse and select Refer to ADS to document.    Reason for Disposition   Taking birth control pills and hasn't missed taking any pills    Additional Information   Negative: SEVERE vaginal bleeding (e.g., continuous red blood from vagina, or large blood clots) and very weak (can't stand)   Negative: Passed out (i.e., fainted, collapsed and was not responding)   Negative: Difficult to awaken or acting confused (e.g., disoriented, slurred speech)   Negative: Shock suspected (e.g., cold/pale/clammy skin, too weak to stand, low BP, rapid pulse)   Negative: Sounds like a life-threatening emergency to the triager   Negative: Pregnant 20 or more weeks (5 months or more)   Negative: Pregnant < 20 weeks (less than 5 months)   Negative: Postpartum (from 0 to 6 weeks after delivery)   Negative: Vaginal discharge is main symptom and bleeding is slight   Negative: SEVERE abdominal pain (e.g., excruciating)   Negative: SEVERE dizziness (e.g., unable to stand, requires support to walk, feels like passing out now)   Negative: SEVERE vaginal bleeding (e.g., soaking 2 pads or tampons per hour and present 2 or more hours; 1 menstrual cup every 2 hours)   Negative: MODERATE vaginal bleeding (i.e., soaking pad or tampon per hour and present > 6 hours; 1 menstrual cup every 6 hours)   Negative: Constant abdominal pain lasting > 2 hours   Negative: Pale skin (pallor) of new-onset or worsening   Negative: Taking Coumadin (warfarin) or other strong blood thinner, or known bleeding disorder (e.g., thrombocytopenia)   Negative: Skin bruises or nosebleed and not caused by an injury   Negative: Bleeding or spotting after procedure (e.g., biopsy) or pelvic examination (e.g., pap smear) that lasts > 7 days   Negative: Passed tissue (e.g., gray-white)   Negative: Patient wants to be seen   Negative: Periods with > 6 soaked pads or tampons per day   Negative: Periods last > 7 days   Negative: Uses  "menstrual cups and more than 80 ml blood per menstrual period   Negative: Missed period has occurred 2 or more times in the last year and the cause is not known   Negative: Menstrual cycle < 21 days OR > 35 days, and occurs more than two cycles (2 months) this past year   Negative: Bleeding or spotting between regular periods occurs more than three cycles (3 months) this past year   Negative: Bleeding or spotting between regular periods occurs more than three cycles (3 months), and using birth control medicine (e.g., pills, patch, Depo-Provera, Implanon, vaginal ring, Mirena IUD)   Negative: Bleeding or spotting occurs after hysterectomy   Negative: Age > 39 years with irregular or excessive bleeding   Negative: Bleeding or spotting occurs after sex  (Exception: First intercourse.)    Answer Assessment - Initial Assessment Questions  1. AMOUNT: \"Describe the bleeding that you are having.\"     - SPOTTING: spotting, or pinkish / brownish mucous discharge; does not fill panty liner or pad     - MILD:  less than 1 pad / hour; less than patient's usual menstrual bleeding    - MODERATE: 1-2 pads / hour; 1 menstrual cup every 6 hours; small-medium blood clots (e.g., pea, grape, small coin)    - SEVERE: soaking 2 or more pads/hour for 2 or more hours; 1 menstrual cup every 2 hours; bleeding not contained by pads or continuous red blood from vagina; large blood clots (e.g., golf ball, large coin)       Mild; 1 tampon every 1-2 hours  2. ONSET: \"When did the bleeding begin?\" \"Is it continuing now?\"      9/2/204. Period was 8/24/-8/28  3. MENSTRUAL PERIOD: \"When was the last normal menstrual period?\" \"How is this different than your period?\"      8/24-8/28  4. REGULARITY: \"How regular are your periods?\"      Regular, since jenla, comes every 2 weeks maybe, provider rec wait a few months to see; September is 3rd month on jencycla  5. ABDOMEN PAIN: \"Do you have any pain?\" \"How bad is the pain?\"  (e.g., Scale 1-10; mild, " "moderate, or severe)    - MILD (1-3): doesn't interfere with normal activities, abdomen soft and not tender to touch     - MODERATE (4-7): interferes with normal activities or awakens from sleep, abdomen tender to touch     - SEVERE (8-10): excruciating pain, doubled over, unable to do any normal activities       No abdominal pain  6. PREGNANCY: \"Is there any chance you are pregnant?\" \"When was your last menstrual period?\"      NA  7. BREASTFEEDING: \"Are you breastfeeding?\"      NA  8. HORMONE MEDICINES: \"Are you taking any hormone medicines, prescription or over-the-counter?\" (e.g., birth control pills, estrogen)      [facility]  9. BLOOD THINNER MEDICINES: \"Do you take any blood thinners?\" (e.g., Coumadin / warfarin, Pradaxa / dabigatran, aspirin)      denies  10. CAUSE: \"What do you think is causing the bleeding?\" (e.g., recent gyn surgery, recent gyn procedure; known bleeding disorder, cervical cancer, polycystic ovarian disease, fibroids)          Unsure; denies recent surgery or injury to area  11. HEMODYNAMIC STATUS: \"Are you weak or feeling lightheaded?\" If Yes, ask: \"Can you stand and walk normally?\"         States can stand and walk normally  12. OTHER SYMPTOMS: \"What other symptoms are you having with the bleeding?\" (e.g., passed tissue, vaginal discharge, fever, menstrual-type cramps)        Fever on 9/2, 101.4 lasted until yesterday, no fever now    Protocols used: Vaginal Bleeding - [id number]    Teena Capellan RN    "

## 2024-09-04 NOTE — TELEPHONE ENCOUNTER
Fever 2 days ago is strange and not explained by unscheduled vaginal bleeding.    If she feels like low back pain is associated the bleeding, like period low back pain. No pain going down legs. No injury to suggest musculoskel back issue,     Would have her reach out to her gyn.     Maria Elena Albarado M.D.

## 2024-09-17 ENCOUNTER — TRANSFERRED RECORDS (OUTPATIENT)
Dept: HEALTH INFORMATION MANAGEMENT | Facility: CLINIC | Age: 19
End: 2024-09-17
Payer: COMMERCIAL

## 2024-09-30 ENCOUNTER — TELEPHONE (OUTPATIENT)
Dept: CARDIOLOGY | Facility: CLINIC | Age: 19
End: 2024-09-30
Payer: COMMERCIAL

## 2024-09-30 ENCOUNTER — LAB (OUTPATIENT)
Dept: LAB | Facility: CLINIC | Age: 19
End: 2024-09-30
Payer: COMMERCIAL

## 2024-09-30 DIAGNOSIS — Z11.4 SCREENING FOR HIV (HUMAN IMMUNODEFICIENCY VIRUS): ICD-10-CM

## 2024-09-30 DIAGNOSIS — Z11.59 NEED FOR HEPATITIS C SCREENING TEST: ICD-10-CM

## 2024-09-30 DIAGNOSIS — R76.8 THYROID ANTIBODY POSITIVE: ICD-10-CM

## 2024-09-30 DIAGNOSIS — Z11.3 SCREENING FOR STDS (SEXUALLY TRANSMITTED DISEASES): Primary | ICD-10-CM

## 2024-09-30 LAB
HCV AB SERPL QL IA: NONREACTIVE
HIV 1+2 AB+HIV1 P24 AG SERPL QL IA: NONREACTIVE
TSH SERPL DL<=0.005 MIU/L-ACNC: 1.88 UIU/ML (ref 0.3–4.2)

## 2024-09-30 PROCEDURE — 86364 TISS TRNSGLTMNASE EA IG CLAS: CPT | Performed by: INTERNAL MEDICINE

## 2024-09-30 PROCEDURE — 87389 HIV-1 AG W/HIV-1&-2 AB AG IA: CPT

## 2024-09-30 PROCEDURE — 99000 SPECIMEN HANDLING OFFICE-LAB: CPT | Performed by: INTERNAL MEDICINE

## 2024-09-30 PROCEDURE — 86231 EMA EACH IG CLASS: CPT | Mod: 90 | Performed by: INTERNAL MEDICINE

## 2024-09-30 PROCEDURE — 84443 ASSAY THYROID STIM HORMONE: CPT

## 2024-09-30 PROCEDURE — 86258 DGP ANTIBODY EACH IG CLASS: CPT | Performed by: INTERNAL MEDICINE

## 2024-09-30 PROCEDURE — 86803 HEPATITIS C AB TEST: CPT

## 2024-09-30 NOTE — TELEPHONE ENCOUNTER
Patient confirmed scheduled appointment:  Date: 03/18/25  Time: 330pm   Visit type: rtn ep   Provider: elizabeth   Location: [facility] [place]  Testing/imaging: n/a   Additional notes: n/a

## 2024-10-01 LAB
GLIADIN IGA SER-ACNC: 4.5 U/ML
GLIADIN IGG SER-ACNC: 0.6 U/ML
TTG IGA SER-ACNC: 1.5 U/ML
TTG IGG SER-ACNC: <0.6 U/ML

## 2024-10-02 LAB — ENDOMYSIUM IGA TITR SER IF: NORMAL {TITER}

## 2024-10-15 ENCOUNTER — MEDICAL CORRESPONDENCE (OUTPATIENT)
Dept: HEALTH INFORMATION MANAGEMENT | Facility: CLINIC | Age: 19
End: 2024-10-15
Payer: COMMERCIAL

## 2024-10-15 ENCOUNTER — TRANSFERRED RECORDS (OUTPATIENT)
Dept: HEALTH INFORMATION MANAGEMENT | Facility: CLINIC | Age: 19
End: 2024-10-15
Payer: COMMERCIAL

## 2024-10-17 ENCOUNTER — OFFICE VISIT (OUTPATIENT)
Dept: PALLIATIVE MEDICINE | Facility: CLINIC | Age: 19
End: 2024-10-17
Payer: COMMERCIAL

## 2024-10-17 ENCOUNTER — TELEPHONE (OUTPATIENT)
Dept: OTOLARYNGOLOGY | Facility: CLINIC | Age: 19
End: 2024-10-17

## 2024-10-17 VITALS — DIASTOLIC BLOOD PRESSURE: 81 MMHG | SYSTOLIC BLOOD PRESSURE: 117 MMHG | HEART RATE: 84 BPM | OXYGEN SATURATION: 98 %

## 2024-10-17 DIAGNOSIS — R10.9 CONTINUOUS SEVERE ABDOMINAL PAIN: Primary | ICD-10-CM

## 2024-10-17 PROCEDURE — 99204 OFFICE O/P NEW MOD 45 MIN: CPT | Performed by: NURSE PRACTITIONER

## 2024-10-17 RX ORDER — SUMATRIPTAN 50 MG/1
TABLET, FILM COATED ORAL
COMMUNITY
Start: 2024-06-28

## 2024-10-17 RX ORDER — OXYCODONE HYDROCHLORIDE 5 MG/1
5 TABLET ORAL EVERY 6 HOURS PRN
Qty: 10 TABLET | Refills: 0 | Status: SHIPPED | OUTPATIENT
Start: 2024-10-17

## 2024-10-17 RX ORDER — PREGABALIN 25 MG/1
25 CAPSULE ORAL 2 TIMES DAILY
Qty: 60 CAPSULE | Refills: 0 | Status: SHIPPED | OUTPATIENT
Start: 2024-10-17

## 2024-10-17 RX ORDER — NORTRIPTYLINE HYDROCHLORIDE 25 MG/1
25 CAPSULE ORAL AT BEDTIME
COMMUNITY

## 2024-10-17 ASSESSMENT — PAIN SCALES - PAIN ENJOYMENT GENERAL ACTIVITY SCALE (PEG)
AVG_PAIN_PASTWEEK: 8
INTERFERED_ENJOYMENT_LIFE: 8
AVG_PAIN_PASTWEEK: 8
PEG_TOTALSCORE: 8
INTERFERED_ENJOYMENT_LIFE: 8
INTERFERED_GENERAL_ACTIVITY: 8
PEG_TOTALSCORE: 8
INTERFERED_GENERAL_ACTIVITY: 8

## 2024-10-17 ASSESSMENT — PAIN SCALES - GENERAL: PAINLEVEL: SEVERE PAIN (6)

## 2024-10-17 NOTE — PROGRESS NOTES
"[facility]     Date of visit: 10/17/2024    Consultation: Aniyah Curry is a 19 year old female who has a past medical history of RetroCerebellar arachnoid cyst and Seasonal allergic rhinitis. Aniyah is being seen today at the request of Maria Elena Albarado for evaluation of her pain issues and recommendations for management.     Referral placed:   Maria Elena Albarado MD   [address] [place] [address]   Phone: [phone]   Fax: [phone]    Diagnoses: Chronic abdominal pain   Order: Pain Management  Referral        Referring provider comments: Consult Only - One Time Evaluation. multiple ER visits for abdominal pain, no cause found, seeing GI. I'm wondering about pain medications but given her young age, want to be very cautious about  narcotics.     Relevant records/History:  I have reviewed available medical information in the patient's electronic medical record including relevant provider notes, laboratory work, and imaging.   _________________________________________________________  History of Present Illness   Aniyah reports:  -She has had abdominal pain since childhood, however at that time was diagnosed with constipation related pain.  She managed this with MiraLAX and senna.  -Earlier this year in the spring, she developed more severe episodes of abdominal pain.  She was seen in the emergency room, and no significant underlying cause was determined.  She was treated with Toradol, which did not provide relief.  \"Sometimes they would just snow me, but I would want to go home and sleep.\"  She had used oxycodone very rarely in the spring for severe pain.  This was helpful, and tolerated.  She tried Bentyl, this did not change her pain at all.  She tried it again later this summer, still had no relief.  -History is significant for migraine headaches.  She is currently taking nortriptyline 25 mg at bedtime.  She is unsure if this is helpful.  She " recalls that the question of abdominal migraines as part of the differential diagnosis was mentioned.  -On the morning of October 15, she had endoscopy at [facility].  Result is not available for review, however she said they brought up the possibility of superior mesenteric artery syndrome.  She will also be seeing ENT for evaluation.  -She is a college student at [employer], had to withdraw in the spring due to health related issues.  She returned this fall and has been working with disability services so that she can stay in school.  She also works part-time for childcare center.  Currently majoring in business, considering switching to elementary education.  Despite the pain, she tries to remain engaged and active in her life.  -When pain occurs, she finds that she tends to lie on her side and pull her knee up to her chest.  She does not feel this pain is the same as what she experienced as a child.      Pain description:  Location: see patient provided diagram below    Quality: sharp   Duration: PM   Severity/Intensity (0 = No pain to 10 = Worst pain imaginable)   Now: 7, Average: 7, Best: 5, Worst: 8  Aggravating factors include: exercise  Relieving factors include: nothing relieves the pain  Pain Intensity and Interference in the past week  Average Pain 8  Pain interference with your ENJOYMENT OF LIFE? 8  Pain interference with your GENERAL ACTIVITY? 8  PEG Total Score: 8    Current pain medications:  Bentyl    Other related medications  Lexapro  Nortriptyline    Review of [place] Prescription Monitoring Program completed today, no controlled substance fills.    PAIN MANAGEMENT TREATMENT HISTORY  1. MEDICATIONS:  Opioids: oxycodone-somewhat helpful  NSAIDs: Ibuprofen (Advil, Motrin), Ketorolac (Toradol) not helpful  Muscle relaxants: Methocarbamol (Robaxin) not helpful  Migraine Medications: Sumatriptan (Imitrex) tablets, nortriptyline-unsure if helpful  Pain Antidepressants/ Anxiolytics: amitriptyline, Lexapro    Neuroleptics: not tried  Topical: Lidoderm patch not helpful  Adjuvant medications: Acetaminophen has not been helpful  2. PHYSICAL THERAPY:   Has not tried  3. PAIN PSYCHOLOGY:   Has not tried  4. SURGERY:   No pain related surgeries  5. INJECTIONS:   Has not tried  6. COMPLEMENTARY THERAPY:  Chiropractic: Has not tried  Acupuncture/acupressure: Has not tried  TENS unit: Has not tried  heat/cold applications: Tried, not helpful  7. PREVIOUS PAIN CLINIC:  Aniyah has not been seen at a pain clinic in the past.       Past Medical History   She has a past medical history of RetroCerebellar arachnoid cyst and Seasonal allergic rhinitis.   Past Surgical History   She has a past surgical history that includes appendectomy; Labral tear repair (Left, 01/2022); Tilt Table Study (N/A, 5/8/2024); and Esophagoscopy, gastroscopy, duodenoscopy (EGD), combined (N/A, 6/13/2024).   Social History   She reports that she has never smoked. She has never been exposed to tobacco smoke. She has never used smokeless tobacco. She reports that she does not drink alcohol and does not use drugs.  Social History     Social History Narrative    Not on file        Medications and Allergies reviewed.  Medications    has a current medication list which includes the following prescription(s): dicyclomine, escitalopram, jencycla, ondansetron, and polyethylene glycol.  Allergies   No known drug allergy  Objective   /81   Pulse 84   SpO2 98%   Constitutional: Well developed, well nourished, appears stated age. No acute distress.  Gait is normal and steady  HEENT: Head atraumatic, normocephalic. Eyes without conjunctival injection or jaundice. Neck supple.   Skin: No obvious rash, lesions, or petechiae of exposed skin.   Extremities: Peripheral pulses intact. No clubbing, cyanosis, or edema. Moves all extremities.  Psychiatric/mental status: Alert, without lethargy or stupor. Speech fluent. Appropriate affect. Mood normal. Able to follow  commands without difficulty.   Musculoskeletal exam: Normal bulk and tone. Unremarkable spinal curvature.   Significant Results and Procedures    Imaging:  Endoscopy report is not available.  Labs:  Creatinine   Date Value Ref Range Status   06/09/2024 0.73 0.51 - 0.95 mg/dL Final     AST   Date Value Ref Range Status   06/09/2024 19 0 - 35 U/L Final     Comment:     Reference intervals for this test were updated on 6/12/2023 to more accurately reflect our healthy population. There may be differences in the flagging of prior results with similar values performed with this method. Interpretation of those prior results can be made in the context of the updated reference intervals.     ALT   Date Value Ref Range Status   06/09/2024 12 0 - 50 U/L Final     Comment:     Reference intervals for this test were updated on 6/12/2023 to more accurately reflect our healthy population. There may be differences in the flagging of prior results with similar values performed with this method. Interpretation of those prior results can be made in the context of the updated reference intervals.           Assessment & Plan   Continuous severe abdominal pain  We discussed treatment options; this could be a functional abdominal pain.  Discussed that treatment of this is with medications to reduce central sensitization.  The other option is that there is an underlying gastrointestinal condition causing her pain that has not been identified yet.  If she does have SMA, this is known to be extraordinarily painful for some people.    Therefore we discussed taking an approach that can allow her relief, irregardless of the cause, and allow her to continue with to engage fully in her life.  I did prescribe her oxycodone 5 mg tablets #10.  She is going to take 1/2 tablet sparingly if needed, and given her history of constipation, recommend she take senna or miralax if she takes a dose of oxycodone.  Advised her to keep her medication in a locked  box, and provided prescription for Narcan nasal spray.  She is also going to start Lyrica 25 mg at bedtime, and increase up as tolerated until she gets to 50 mg twice a day.  She was advised she can stop if she has relief at a smaller dose.  If this is beneficial, dose can be uptitrated.  We also discussed other treatments for functional abdominal pain including pain focused PT and TENS unit.  She is going to try the TENS unit and medication, then follow-up with Dr. Albarado to discuss response to treatment.  She is welcome to return here anytime as needed.    - pregabalin (LYRICA) 25 MG capsule  Dispense: 60 capsule; Refill: 0  - oxyCODONE (ROXICODONE) 5 MG tablet  Dispense: 10 tablet; Refill: 0  - naloxone (NARCAN) 4 MG/0.1ML nasal spray  Dispense: 2 each; Refill: 0    Alena Do, CNP-BC, PMGT-BC, AP-PMN  [facility]

## 2024-10-17 NOTE — TELEPHONE ENCOUNTER
"[facility] Call Center    Phone Message    May a detailed message be left on voicemail: yes     Reason for Call: Appointment Intake    Referring Provider Name: Provider: SHENA STALLINGS DO   Affiliated with: [facility]   Diagnosis and/or Symptoms: Gastric erythema R11.2 (ICD-10-CM) - Nausea with vomiting, unspecified J39.2 (ICD-10-CM) - Lesion of oropharynx-Referral notes: \"Tissue bogginess adjacent and to the right of the uvula, upon gastric endoscope withdrawal\"       PLEASE REVIEW, SECURE CHAT WAS SENT DX IS NOT ON THE PROTOCOLS, NO RESPONSE RECEIVED.  Action Taken: Message routed to:  [facility] ENT    Travel Screening: Not Applicable                                                                          "

## 2024-10-17 NOTE — PATIENT INSTRUCTIONS
Start Lyrica following the instructions below  One capsule = 25 mg.  Move to the next step every 3-5 days. Watch for sedation or sleepiness.   You can go slower if you need to or stop when you have adequate relief.   Do not stop abruptly if you are taking more than 150 mg of Lyrica    Time of Day  AM  BEDTIME  Step 1: none  1 capsule    Step 2: None  2 capsule   Step 3: 1 capsule 2 capsules   Step 4: 2 capsules 2 capsules.      Oxycodone as needed.    TENS Unit- look on [employer]

## 2024-10-17 NOTE — LETTER
"10/17/2024      Aniyah Curry  [address] [place] [address]      Dear Colleague,    Thank you for referring your patient, Aniyah Curry, to the [facility] [place]. Please see a copy of my visit note below.    [facility]     Date of visit: 10/17/2024    Consultation: Aniyah Curry is a 19 year old female who has a past medical history of RetroCerebellar arachnoid cyst and Seasonal allergic rhinitis. Aniyah is being seen today at the request of Maria Elena Albarado for evaluation of her pain issues and recommendations for management.     Referral placed:   Maria Elena Albarado MD   [address] [place] [address]   Phone: [phone]   Fax: [phone]    Diagnoses: Chronic abdominal pain   Order: Pain Management  Referral        Referring provider comments: Consult Only - One Time Evaluation. multiple ER visits for abdominal pain, no cause found, seeing GI. I'm wondering about pain medications but given her young age, want to be very cautious about  narcotics.     Relevant records/History:  I have reviewed available medical information in the patient's electronic medical record including relevant provider notes, laboratory work, and imaging.   _________________________________________________________  History of Present Illness  Aniyah reports:  -She has had abdominal pain since childhood, however at that time was diagnosed with constipation related pain.  She managed this with MiraLAX and senna.  -Earlier this year in the spring, she developed more severe episodes of abdominal pain.  She was seen in the emergency room, and no significant underlying cause was determined.  She was treated with Toradol, which did not provide relief.  \"Sometimes they would just snow me, but I would want to go home and sleep.\"  She had used oxycodone very rarely in the spring for severe pain.  This was helpful, and tolerated.  She tried " Bentyl, this did not change her pain at all.  She tried it again later this summer, still had no relief.  -History is significant for migraine headaches.  She is currently taking nortriptyline 25 mg at bedtime.  She is unsure if this is helpful.  She recalls that the question of abdominal migraines as part of the differential diagnosis was mentioned.  -On the morning of October 15, she had endoscopy at [facility].  Result is not available for review, however she said they brought up the possibility of superior mesenteric artery syndrome.  She will also be seeing ENT for evaluation.  -She is a college student at [employer], had to withdraw in the spring due to health related issues.  She returned this fall and has been working with disability services so that she can stay in school.  She also works part-time for childcare center.  Currently majoring in business, considering switching to elementary education.  Despite the pain, she tries to remain engaged and active in her life.  -When pain occurs, she finds that she tends to lie on her side and pull her knee up to her chest.  She does not feel this pain is the same as what she experienced as a child.      Pain description:  Location: see patient provided diagram below    Quality: sharp   Duration: PM   Severity/Intensity (0 = No pain to 10 = Worst pain imaginable)   Now: 7, Average: 7, Best: 5, Worst: 8  Aggravating factors include: exercise  Relieving factors include: nothing relieves the pain  Pain Intensity and Interference in the past week  Average Pain 8  Pain interference with your ENJOYMENT OF LIFE? 8  Pain interference with your GENERAL ACTIVITY? 8  PEG Total Score: 8    Current pain medications:  Bentyl    Other related medications  Lexapro  Nortriptyline    Review of [place] Prescription Monitoring Program completed today, no controlled substance fills.    PAIN MANAGEMENT TREATMENT HISTORY  1. MEDICATIONS:  Opioids: oxycodone-somewhat helpful  NSAIDs:  Ibuprofen (Advil, Motrin), Ketorolac (Toradol) not helpful  Muscle relaxants: Methocarbamol (Robaxin) not helpful  Migraine Medications: Sumatriptan (Imitrex) tablets, nortriptyline-unsure if helpful  Pain Antidepressants/ Anxiolytics: amitriptyline, Lexapro   Neuroleptics: not tried  Topical: Lidoderm patch not helpful  Adjuvant medications: Acetaminophen has not been helpful  2. PHYSICAL THERAPY:   Has not tried  3. PAIN PSYCHOLOGY:   Has not tried  4. SURGERY:   No pain related surgeries  5. INJECTIONS:   Has not tried  6. COMPLEMENTARY THERAPY:  Chiropractic: Has not tried  Acupuncture/acupressure: Has not tried  TENS unit: Has not tried  heat/cold applications: Tried, not helpful  7. PREVIOUS PAIN CLINIC:  Aniyah has not been seen at a pain clinic in the past.       Past Medical History  She has a past medical history of RetroCerebellar arachnoid cyst and Seasonal allergic rhinitis.   Past Surgical History  She has a past surgical history that includes appendectomy; Labral tear repair (Left, 01/2022); Tilt Table Study (N/A, 5/8/2024); and Esophagoscopy, gastroscopy, duodenoscopy (EGD), combined (N/A, 6/13/2024).   Social History  She reports that she has never smoked. She has never been exposed to tobacco smoke. She has never used smokeless tobacco. She reports that she does not drink alcohol and does not use drugs.  Social History     Social History Narrative     Not on file        Medications and Allergies reviewed.  Medications   has a current medication list which includes the following prescription(s): dicyclomine, escitalopram, jencycla, ondansetron, and polyethylene glycol.  Allergies  No known drug allergy  Objective  /81   Pulse 84   SpO2 98%   Constitutional: Well developed, well nourished, appears stated age. No acute distress.  Gait is normal and steady  HEENT: Head atraumatic, normocephalic. Eyes without conjunctival injection or jaundice. Neck supple.   Skin: No obvious rash, lesions,  or petechiae of exposed skin.   Extremities: Peripheral pulses intact. No clubbing, cyanosis, or edema. Moves all extremities.  Psychiatric/mental status: Alert, without lethargy or stupor. Speech fluent. Appropriate affect. Mood normal. Able to follow commands without difficulty.   Musculoskeletal exam: Normal bulk and tone. Unremarkable spinal curvature.   Significant Results and Procedures   Imaging:  Endoscopy report is not available.  Labs:  Creatinine   Date Value Ref Range Status   06/09/2024 0.73 0.51 - 0.95 mg/dL Final     AST   Date Value Ref Range Status   06/09/2024 19 0 - 35 U/L Final     Comment:     Reference intervals for this test were updated on 6/12/2023 to more accurately reflect our healthy population. There may be differences in the flagging of prior results with similar values performed with this method. Interpretation of those prior results can be made in the context of the updated reference intervals.     ALT   Date Value Ref Range Status   06/09/2024 12 0 - 50 U/L Final     Comment:     Reference intervals for this test were updated on 6/12/2023 to more accurately reflect our healthy population. There may be differences in the flagging of prior results with similar values performed with this method. Interpretation of those prior results can be made in the context of the updated reference intervals.           Assessment & Plan  Continuous severe abdominal pain  We discussed treatment options; this could be a functional abdominal pain.  Discussed that treatment of this is with medications to reduce central sensitization.  The other option is that there is an underlying gastrointestinal condition causing her pain that has not been identified yet.  If she does have SMA, this is known to be extraordinarily painful for some people.    Therefore we discussed taking an approach that can allow her relief, irregardless of the cause, and allow her to continue with to engage fully in her life.  I did  prescribe her oxycodone 5 mg tablets #10.  She is going to take 1/2 tablet sparingly if needed, and given her history of constipation, recommend she take senna or miralax if she takes a dose of oxycodone.  Advised her to keep her medication in a locked box, and provided prescription for Narcan nasal spray.  She is also going to start Lyrica 25 mg at bedtime, and increase up as tolerated until she gets to 50 mg twice a day.  She was advised she can stop if she has relief at a smaller dose.  If this is beneficial, dose can be uptitrated.  We also discussed other treatments for functional abdominal pain including pain focused PT and TENS unit.  She is going to try the TENS unit and medication, then follow-up with Dr. Albardao to discuss response to treatment.  She is welcome to return here anytime as needed.    - pregabalin (LYRICA) 25 MG capsule  Dispense: 60 capsule; Refill: 0  - oxyCODONE (ROXICODONE) 5 MG tablet  Dispense: 10 tablet; Refill: 0  - naloxone (NARCAN) 4 MG/0.1ML nasal spray  Dispense: 2 each; Refill: 0    Alena Do, CNP-BC, PMGT-BC, AP-PMN  [facility], [place]            Again, thank you for allowing me to participate in the care of your patient.        Sincerely,        Alena Do, MELITA

## 2024-10-18 NOTE — TELEPHONE ENCOUNTER
Patient confirmed scheduled appointment:  Date: 2/17/25  Time: 9am  Visit type: New ENT   Provider: Tracy Abbasi  [place] [facility] [place]  Testing/imaging:   Additional notes:

## 2024-10-29 ENCOUNTER — MYC MEDICAL ADVICE (OUTPATIENT)
Dept: PEDIATRICS | Facility: CLINIC | Age: 19
End: 2024-10-29
Payer: COMMERCIAL

## 2024-10-29 ENCOUNTER — TRANSFERRED RECORDS (OUTPATIENT)
Dept: HEALTH INFORMATION MANAGEMENT | Facility: CLINIC | Age: 19
End: 2024-10-29
Payer: COMMERCIAL

## 2024-11-08 ENCOUNTER — ALLIED HEALTH/NURSE VISIT (OUTPATIENT)
Dept: CARDIOLOGY | Facility: CLINIC | Age: 19
End: 2024-11-08
Attending: NURSE PRACTITIONER
Payer: COMMERCIAL

## 2024-11-08 DIAGNOSIS — R00.0 SINUS TACHYCARDIA: ICD-10-CM

## 2024-11-08 PROCEDURE — 93246 EXT ECG>7D<15D RECORDING: CPT

## 2024-11-08 NOTE — NURSING NOTE
Aniyah Curry arrived here on 11/8/2024 12:50 PM for 8-14 Days  Zio monitor placement per ordering provider Tino for the diagnosis sinus tachycardia.  Dr. Nicole is the supervising MD. Patient s skin was prepped per protocol.  Zio monitor was placed.  Instructions were reviewed with and given to the patient.  Patient verbalized understanding of wear, troubleshooting and monitor return instructions.

## 2024-11-12 ENCOUNTER — ANCILLARY PROCEDURE (OUTPATIENT)
Dept: CT IMAGING | Facility: CLINIC | Age: 19
End: 2024-11-12
Attending: INTERNAL MEDICINE
Payer: COMMERCIAL

## 2024-11-12 DIAGNOSIS — R10.12 LEFT UPPER QUADRANT PAIN: ICD-10-CM

## 2024-11-12 LAB
CREAT BLD-MCNC: 0.7 MG/DL (ref 0.5–1)
EGFRCR SERPLBLD CKD-EPI 2021: >60 ML/MIN/1.73M2

## 2024-11-12 PROCEDURE — 82565 ASSAY OF CREATININE: CPT | Performed by: PATHOLOGY

## 2024-11-12 PROCEDURE — 74174 CTA ABD&PLVS W/CONTRAST: CPT | Performed by: RADIOLOGY

## 2024-11-12 RX ORDER — IOPAMIDOL 755 MG/ML
65 INJECTION, SOLUTION INTRAVASCULAR ONCE
Status: COMPLETED | OUTPATIENT
Start: 2024-11-12 | End: 2024-11-12

## 2024-11-12 RX ADMIN — IOPAMIDOL 65 ML: 755 INJECTION, SOLUTION INTRAVASCULAR at 18:02

## 2024-11-12 NOTE — DISCHARGE INSTRUCTIONS

## 2024-11-20 ENCOUNTER — HOSPITAL ENCOUNTER (OUTPATIENT)
Dept: NUCLEAR MEDICINE | Facility: CLINIC | Age: 19
Setting detail: NUCLEAR MEDICINE
Discharge: HOME | End: 2024-11-20
Attending: INTERNAL MEDICINE
Payer: COMMERCIAL

## 2024-11-20 DIAGNOSIS — R10.12 LEFT UPPER QUADRANT PAIN: ICD-10-CM

## 2024-11-20 PROCEDURE — 343N000001 HC RX 343 MED OP 636: Performed by: INTERNAL MEDICINE

## 2024-11-20 PROCEDURE — 78264 GASTRIC EMPTYING IMG STUDY: CPT

## 2024-11-20 PROCEDURE — A9541 TC99M SULFUR COLLOID: HCPCS | Performed by: INTERNAL MEDICINE

## 2024-11-20 RX ADMIN — Medication 2 MILLICURIE: at 08:10

## 2024-12-10 NOTE — TELEPHONE ENCOUNTER
"FUTURE VISIT INFORMATION      FUTURE VISIT INFORMATION:  Date: 2/17/25  Time: 9AM  [place] [facility] [place]ERRAL INFORMATION:  Referring provider:  SHENA STALLINGS DO   Referring providers clinic:  [facility]  Reason for visit/diagnosis  Gastric erythema R11.2 (ICD-10-CM) - Nausea with vomiting, unspecified J39.2 (ICD-10-CM) - Lesion of oropharynx-Referral notes: \"Tissue bogginess adjacent and to the right of the uvula, upon gastric endoscope withdrawal\"     RECORDS REQUESTED FROM:       Clinic name Comments Records Status Imaging Status   [facility] 12/3/24, 10/29/24, 6/19/24 - Ov SHENA Bergeron DO     10/15/24- EGD Scanned in     [facility]  9/17/24, 6/28/24 - OV  Scanned in     [facility]  6/24/24- Ov w. Sharon Zelaya, [facility]  Epic     Imaging  5/6/25- MR Brain   4/22/24- MRV Brain + MR Brain  [facility]  PACS            \"Please notify/message CSS if patient completed outside imaging prior to scheduled appointment and/or any outside records that might have been missed at pre visit -Thanks\"  "

## 2024-12-30 ENCOUNTER — OFFICE VISIT (OUTPATIENT)
Dept: PEDIATRICS | Facility: CLINIC | Age: 19
End: 2024-12-30
Attending: INTERNAL MEDICINE
Payer: COMMERCIAL

## 2024-12-30 VITALS
BODY MASS INDEX: 18.38 KG/M2 | OXYGEN SATURATION: 98 % | SYSTOLIC BLOOD PRESSURE: 121 MMHG | HEART RATE: 80 BPM | HEIGHT: 63 IN | WEIGHT: 103.7 LBS | TEMPERATURE: 97.8 F | RESPIRATION RATE: 17 BRPM | DIASTOLIC BLOOD PRESSURE: 80 MMHG

## 2024-12-30 DIAGNOSIS — Z00.00 ROUTINE GENERAL MEDICAL EXAMINATION AT A HEALTH CARE FACILITY: Primary | ICD-10-CM

## 2024-12-30 DIAGNOSIS — F43.23 SITUATIONAL MIXED ANXIETY AND DEPRESSIVE DISORDER: ICD-10-CM

## 2024-12-30 DIAGNOSIS — K55.1 SMAS (SUPERIOR MESENTERIC ARTERY SYNDROME) (H): ICD-10-CM

## 2024-12-30 PROCEDURE — 99395 PREV VISIT EST AGE 18-39: CPT | Performed by: INTERNAL MEDICINE

## 2024-12-30 PROCEDURE — 99213 OFFICE O/P EST LOW 20 MIN: CPT | Mod: 25 | Performed by: INTERNAL MEDICINE

## 2024-12-30 RX ORDER — LUBIPROSTONE 8 UG/1
8 CAPSULE ORAL DAILY
COMMUNITY
Start: 2024-11-20

## 2024-12-30 RX ORDER — ESCITALOPRAM OXALATE 5 MG/1
7.5 TABLET ORAL DAILY
Qty: 135 TABLET | Refills: 1 | Status: SHIPPED | OUTPATIENT
Start: 2024-12-30

## 2024-12-30 SDOH — HEALTH STABILITY: PHYSICAL HEALTH: ON AVERAGE, HOW MANY MINUTES DO YOU ENGAGE IN EXERCISE AT THIS LEVEL?: 60 MIN

## 2024-12-30 SDOH — HEALTH STABILITY: PHYSICAL HEALTH: ON AVERAGE, HOW MANY DAYS PER WEEK DO YOU ENGAGE IN MODERATE TO STRENUOUS EXERCISE (LIKE A BRISK WALK)?: 4 DAYS

## 2024-12-30 ASSESSMENT — PAIN SCALES - GENERAL: PAINLEVEL_OUTOF10: SEVERE PAIN (6)

## 2024-12-30 ASSESSMENT — SOCIAL DETERMINANTS OF HEALTH (SDOH): HOW OFTEN DO YOU GET TOGETHER WITH FRIENDS OR RELATIVES?: MORE THAN THREE TIMES A WEEK

## 2024-12-30 NOTE — PATIENT INSTRUCTIONS
I do recommend the flu shot. We don't want you getting sick before potential surgery.    Check with GI about doing Lyrica at low dose just at bedtime. Let them know you're having trouble sleeping due to pain and if they are OK, I do think it's OK to take at bedtime.    Patient Education   Preventive Care Advice   This is general advice given by our system to help you stay healthy. However, your care team may have specific advice just for you. Please talk to your care team about your preventive care needs.  Nutrition  Eat 5 or more servings of fruits and vegetables each day.  Try wheat bread, brown rice and whole grain pasta (instead of white bread, rice, and pasta).  Get enough calcium and vitamin D. Check the label on foods and aim for 100% of the RDA (recommended daily allowance).  Lifestyle  Exercise at least 150 minutes each week  (30 minutes a day, 5 days a week).  Do muscle strengthening activities 2 days a week. These help control your weight and prevent disease.  No smoking.  Wear sunscreen to prevent skin cancer.  Have a dental exam and cleaning every 6 months.  Yearly exams  See your health care team every year to talk about:  Any changes in your health.  Any medicines your care team has prescribed.  Preventive care, family planning, and ways to prevent chronic diseases.  Shots (vaccines)   HPV shots (up to age 26), if you've never had them before.  Hepatitis B shots (up to age 59), if you've never had them before.  COVID-19 shot: Get this shot when it's due.  Flu shot: Get a flu shot every year.  Tetanus shot: Get a tetanus shot every 10 years.  Pneumococcal, hepatitis A, and RSV shots: Ask your care team if you need these based on your risk.  Shingles shot (for age 50 and up)  General health tests  Diabetes screening:  Starting at age 35, Get screened for diabetes at least every 3 years.  If you are younger than age 35, ask your care team if you should be screened for diabetes.  Cholesterol test: At age  39, start having a cholesterol test every 5 years, or more often if advised.  Bone density scan (DEXA): At age 50, ask your care team if you should have this scan for osteoporosis (brittle bones).  Hepatitis C: Get tested at least once in your life.  STIs (sexually transmitted infections)  Before age 24: Ask your care team if you should be screened for STIs.  After age 24: Get screened for STIs if you're at risk. You are at risk for STIs (including HIV) if:  You are sexually active with more than one person.  You don't use condoms every time.  You or a partner was diagnosed with a sexually transmitted infection.  If you are at risk for HIV, ask about PrEP medicine to prevent HIV.  Get tested for HIV at least once in your life, whether you are at risk for HIV or not.  Cancer screening tests  Cervical cancer screening: If you have a cervix, begin getting regular cervical cancer screening tests starting at age 21.  Breast cancer scan (mammogram): If you've ever had breasts, begin having regular mammograms starting at age 40. This is a scan to check for breast cancer.  Colon cancer screening: It is important to start screening for colon cancer at age 45.  Have a colonoscopy test every 10 years (or more often if you're at risk) Or, ask your provider about stool tests like a FIT test every year or Cologuard test every 3 years.  To learn more about your testing options, visit:   .  For help making a decision, visit:   https://bit.ly/bk13376.  Prostate cancer screening test: If you have a prostate, ask your care team if a prostate cancer screening test (PSA) at age 55 is right for you.  Lung cancer screening: If you are a current or former smoker ages 50 to 80, ask your care team if ongoing lung cancer screenings are right for you.  For informational purposes only. Not to replace the advice of your health care provider. Copyright   2023 [employer]. All rights reserved. Clinically reviewed by the [facility]  Couch Transitions Program. [employer] [id number] - REV 01/24.  Learning About Stress  What is stress?     Stress is your body's response to a hard situation. Your body can have a physical, emotional, or mental response. Stress is a fact of life for most people, and it affects everyone differently. What causes stress for you may not be stressful for someone else.  A lot of things can cause stress. You may feel stress when you go on a job interview, take a test, or run a race. This kind of short-term stress is normal and even useful. It can help you if you need to work hard or react quickly. For example, stress can help you finish an important job on time.  Long-term stress is caused by ongoing stressful situations or events. Examples of long-term stress include long-term health problems, ongoing problems at work, or conflicts in your family. Long-term stress can harm your health.  How does stress affect your health?  When you are stressed, your body responds as though you are in danger. It makes hormones that speed up your heart, make you breathe faster, and give you a burst of energy. This is called the fight-or-flight stress response. If the stress is over quickly, your body goes back to normal and no harm is done.  But if stress happens too often or lasts too long, it can have bad effects. Long-term stress can make you more likely to get sick, and it can make symptoms of some diseases worse. If you tense up when you are stressed, you may develop neck, shoulder, or low back pain. Stress is linked to high blood pressure and heart disease.  Stress also harms your emotional health. It can make you vargas, tense, or depressed. Your relationships may suffer, and you may not do well at work or school.  What can you do to manage stress?  You can try these things to help manage stress:   Do something active. Exercise or activity can help reduce stress. Walking is a great way to get started. Even everyday activities such as  housecleaning or yard work can help.  Try yoga or nicolette chi. These techniques combine exercise and meditation. You may need some training at first to learn them.  Do something you enjoy. For example, listen to music or go to a movie. Practice your hobby or do volunteer work.  Meditate. This can help you relax, because you are not worrying about what happened before or what may happen in the future.  Do guided imagery. Imagine yourself in any setting that helps you feel calm. You can use online videos, books, or a teacher to guide you.  Do breathing exercises. For example:  From a standing position, bend forward from the waist with your knees slightly bent. Let your arms dangle close to the floor.  Breathe in slowly and deeply as you return to a standing position. Roll up slowly and lift your head last.  Hold your breath for just a few seconds in the standing position.  Breathe out slowly and bend forward from the waist.  Let your feelings out. Talk, laugh, cry, and express anger when you need to. Talking with supportive friends or family, a counselor, or a edward leader about your feelings is a healthy way to relieve stress. Avoid discussing your feelings with people who make you feel worse.  Write. It may help to write about things that are bothering you. This helps you find out how much stress you feel and what is causing it. When you know this, you can find better ways to cope.  What can you do to prevent stress?  You might try some of these things to help prevent stress:  Manage your time. This helps you find time to do the things you want and need to do.  Get enough sleep. Your body recovers from the stresses of the day while you are sleeping.  Get support. Your family, friends, and community can make a difference in how you experience stress.  Limit your news feed. Avoid or limit time on social media or news that may make you feel stressed.  Do something active. Exercise or activity can help reduce stress.  "Walking is a great way to get started.  Where can you learn more?  Go to https://www.[place].net/patiented  Enter N032 in the search box to learn more about \"Learning About Stress.\"  Current as of: October 24, 2023  Content Version: 14.3    2024 [email or web address].   Care instructions adapted under license by your healthcare professional. If you have questions about a medical condition or this instruction, always ask your healthcare professional. [email or web address] disclaims any warranty or liability for your use of this information.       "

## 2024-12-30 NOTE — PROGRESS NOTES
Preventive Care Visit  [facility] XOCHITL Albarado MD, Internal Medicine  Dec 30, 2024      Assessment & Plan     Routine general medical examination at a health care facility      Situational mixed anxiety and depressive disorder  Stable.   - escitalopram (LEXAPRO) 5 MG tablet; Take 1.5 tablets (7.5 mg) by mouth daily.    SMAS (superior mesenteric artery syndrome) (H)  Will be seeing a vascular specialist in [place] in a few weeks to consider surgery. She does still have ongoing pain. In past was rxd lyrica, which she did find helpful. Since nighttime pain is most significant for her, discussed at least doing a dose of 25 mg lyrica at bedtime. She tells me her GI wanted to minimize medications, but it does seem worth it to ensure sleep is adequate, as she is often awake at night due to pain.        Counseling  Appropriate preventive services were addressed with this patient via screening, questionnaire, or discussion as appropriate for fall prevention, nutrition, physical activity, Tobacco-use cessation, social engagement, weight loss and cognition.  Checklist reviewing preventive services available has been given to the patient.  Reviewed patient's diet, addressing concerns and/or questions.   She is at risk for psychosocial distress and has been provided with information to reduce risk.       See Patient Instructions    Katty Fermin is a 19 year old, presenting for the following:  Physical        12/30/2024     9:43 AM   Additional Questions   Roomed by Jazmín ZEPEDA   Accompanied by Self         12/30/2024     9:43 AM   Patient Reported Additional Medications   Patient reports taking the following new medications No        Healthy Habits:     Getting at least 3 servings of Calcium per day:  NO    Bi-annual eye exam:  Yes    Dental care twice a year:  Yes    Sleep apnea or symptoms of sleep apnea:  None    Diet:  Low fat/cholesterol and Low salt    Frequency of exercise:  4-5  days/week    Duration of exercise:  45-60 minutes    Taking medications regularly:  Yes    Barriers to taking medications:  None    Medication side effects:  None    Additional concerns today:  No    Diagnosed with SMAS    [facility] for [facility], sees them in 2 weeks with ultrasound.     Finished this fall semester. In a holding pattern about whether she does online classes for spring if she is going to have surgery.    School this fall went OK. Wasn't in the ER as much as last year.     On progestin only pill. Periods shorter.       Health Care Directive  Patient does not have a Health Care Directive: Patient states has Advance Directive and will bring in a copy to clinic.      12/30/2024   General Health   How would you rate your overall physical health? (!) FAIR   Feel stress (tense, anxious, or unable to sleep) Rather much   (!) STRESS CONCERN      12/30/2024   Nutrition   Three or more servings of calcium each day? (!) NO   Diet: Other   If other, please elaborate: trying to increase carbs and salt intake   How many servings of fruit and vegetables per day? (!) 2-3   How many sweetened beverages each day? 0-1         12/30/2024   Exercise   Days per week of moderate/strenous exercise 4 days   Average minutes spent exercising at this level 60 min         12/30/2024   Social Factors   Frequency of gathering with friends or relatives More than three times a week   Worry food won't last until get money to buy more No   Food not last or not have enough money for food? No   Do you have housing? (Housing is defined as stable permanent housing and does not include staying ouside in a car, in a tent, in an abandoned building, in an overnight shelter, or couch-surfing.) Yes   Are you worried about losing your housing? No   Lack of transportation? No   Unable to get utilities (heat,electricity)? No         12/30/2024   Dental   Dentist two times every year? Yes         12/30/2024   TB Screening   Were  "you born outside of the [place]? No             12/30/2024   Substance Use   Alcohol more than 3/day or more than 7/wk Not Applicable   Do you use any other substances recreationally? No     Social History     Tobacco Use    Smoking status: Never     Passive exposure: Never    Smokeless tobacco: Never   Vaping Use    Vaping status: Never Used   Substance Use Topics    Alcohol use: No    Drug use: No           12/30/2024   STI Screening   New sexual partner(s) since last STI/HIV test? No     History of abnormal Pap smear: No - under age 21, PAP not appropriate for age             12/30/2024   Contraception/Family Planning   Questions about contraception or family planning No        Reviewed and updated as needed this visit by Provider                    Labs reviewed in EPIC      Review of Systems  Constitutional, HEENT, cardiovascular, pulmonary, gi and gu systems are negative, except as otherwise noted.     Objective    Exam  /80 (BP Location: Right arm, Patient Position: Sitting, Cuff Size: Adult Regular)   Pulse 80   Temp 97.8  F (36.6  C) (Oral)   Resp 17   Ht 1.61 m (5' 3.39\")   Wt 47 kg (103 lb 11.2 oz)   LMP 12/24/2024 (Exact Date)   SpO2 98%   BMI 18.15 kg/m     Estimated body mass index is 18.15 kg/m  as calculated from the following:    Height as of this encounter: 1.61 m (5' 3.39\").    Weight as of this encounter: 47 kg (103 lb 11.2 oz).    Physical Exam  GENERAL: alert and no distress  NECK: no adenopathy, no asymmetry, masses, or scars  RESP: lungs clear to auscultation - no rales, rhonchi or wheezes  CV: regular rate and rhythm, normal S1 S2, no S3 or S4, no murmur, click or rub, no peripheral edema  ABDOMEN: soft, nontender, no hepatosplenomegaly, no masses and bowel sounds normal  MS: no gross musculoskeletal defects noted, no edema  : Exam declined by parent/patient.  Reason for decline: Patient/Parental preference      Vision Screen  Vision Screen Details  Reason Vision Screen Not " Completed: Screening Recommend: Patient/Guardian Declined (wears glasses- recent exam)  Does the patient have corrective lenses (glasses/contacts)?: Yes    Hearing Screen  Hearing Screen Not Completed  Reason Hearing Screen was not completed: Parent declined - No concerns        Signed Electronically by: Maria Elena Albarado MD

## 2025-01-03 ENCOUNTER — TELEPHONE (OUTPATIENT)
Dept: PEDIATRICS | Facility: CLINIC | Age: 20
End: 2025-01-03
Payer: COMMERCIAL

## 2025-01-03 NOTE — TELEPHONE ENCOUNTER
Prior Authorization Retail Medication Request    Medication/Dose: escitalopram (LEXAPRO) 5 MG tablet  Diagnosis and ICD code (if different than what is on RX):  same  New/renewal/insurance change PA/secondary ins. PA:  Previously Tried and Failed:  see chart  Rationale:      Insurance   Primary: [employer] OUT OF STATE   Insurance ID:  [id number]     Secondary (if applicable):  Insurance ID:      Pharmacy Information (if different than what is on RX)  Name:  same  Phone:  same  Fax:same    Clinic Information  Preferred routing pool for dept communication: [facility]

## 2025-01-05 NOTE — TELEPHONE ENCOUNTER
Retail Pharmacy Prior Authorization Team   Phone: [phone]    PA Initiation    Medication: ESCITALOPRAM OXALATE 5 MG PO TABS  [facility] Phone [phone] Fax [phone]  Pharmacy Filling the Rx: [id number] [place] [address]  Filling Pharmacy Phone: [phone]  Filling Pharmacy Fax:    Start Date: 1/5/2025    COMPLETED CRITERIA QUESTIONS VIA EPA - PENDING DETERMINATION

## 2025-01-06 NOTE — TELEPHONE ENCOUNTER
Prior Authorization Approval    Medication: ESCITALOPRAM OXALATE 5 MG PO TABS  Authorization Effective Date: 12/7/2024  Authorization Expiration Date: 1/6/2026  Insurance Company: [employer] [place] - Phone [phone] Fax [phone]  Which Pharmacy is filling the prescription: [id number] [place] [address]  Pharmacy Notified: YES  Patient Notified: YES (faxed approval letter to pharmacy and notified patient via [employer] message)

## 2025-01-07 ENCOUNTER — TELEPHONE (OUTPATIENT)
Dept: PEDIATRICS | Facility: CLINIC | Age: 20
End: 2025-01-07
Payer: COMMERCIAL

## 2025-01-08 ENCOUNTER — TRANSFERRED RECORDS (OUTPATIENT)
Dept: HEALTH INFORMATION MANAGEMENT | Facility: CLINIC | Age: 20
End: 2025-01-08
Payer: COMMERCIAL

## 2025-01-13 ENCOUNTER — HOSPITAL ENCOUNTER (OUTPATIENT)
Facility: CLINIC | Age: 20
Setting detail: OBSERVATION
Discharge: HOME | End: 2025-01-14
Attending: EMERGENCY MEDICINE | Admitting: INTERNAL MEDICINE
Payer: COMMERCIAL

## 2025-01-13 ENCOUNTER — MYC MEDICAL ADVICE (OUTPATIENT)
Dept: PEDIATRICS | Facility: CLINIC | Age: 20
End: 2025-01-13

## 2025-01-13 ENCOUNTER — APPOINTMENT (OUTPATIENT)
Dept: CT IMAGING | Facility: CLINIC | Age: 20
End: 2025-01-13
Attending: EMERGENCY MEDICINE
Payer: COMMERCIAL

## 2025-01-13 DIAGNOSIS — K55.1 SMAS (SUPERIOR MESENTERIC ARTERY SYNDROME): ICD-10-CM

## 2025-01-13 DIAGNOSIS — R10.10 UPPER ABDOMINAL PAIN: Primary | ICD-10-CM

## 2025-01-13 DIAGNOSIS — R63.8 DECREASED ORAL INTAKE: ICD-10-CM

## 2025-01-13 DIAGNOSIS — R11.10 POSTPRANDIAL VOMITING: ICD-10-CM

## 2025-01-13 LAB
ALBUMIN SERPL BCG-MCNC: 4.4 G/DL (ref 3.5–5.2)
ALP SERPL-CCNC: 63 U/L (ref 40–150)
ALT SERPL W P-5'-P-CCNC: 11 U/L (ref 0–50)
ANION GAP SERPL CALCULATED.3IONS-SCNC: 14 MMOL/L (ref 7–15)
AST SERPL W P-5'-P-CCNC: 24 U/L (ref 0–45)
BASOPHILS # BLD AUTO: 0 10E3/UL (ref 0–0.2)
BASOPHILS NFR BLD AUTO: 1 %
BILIRUB SERPL-MCNC: 0.2 MG/DL
BUN SERPL-MCNC: 16.2 MG/DL (ref 6–20)
CALCIUM SERPL-MCNC: 9.3 MG/DL (ref 8.8–10.4)
CHLORIDE SERPL-SCNC: 105 MMOL/L (ref 98–107)
CREAT SERPL-MCNC: 0.63 MG/DL (ref 0.51–0.95)
EGFRCR SERPLBLD CKD-EPI 2021: >90 ML/MIN/1.73M2
EOSINOPHIL # BLD AUTO: 0.1 10E3/UL (ref 0–0.7)
EOSINOPHIL NFR BLD AUTO: 1 %
ERYTHROCYTE [DISTWIDTH] IN BLOOD BY AUTOMATED COUNT: 12.3 % (ref 10–15)
GLUCOSE SERPL-MCNC: 94 MG/DL (ref 70–99)
HCG SERPL QL: NEGATIVE
HCO3 SERPL-SCNC: 21 MMOL/L (ref 22–29)
HCT VFR BLD AUTO: 35.7 % (ref 35–47)
HGB BLD-MCNC: 12 G/DL (ref 11.7–15.7)
IMM GRANULOCYTES # BLD: 0 10E3/UL
IMM GRANULOCYTES NFR BLD: 0 %
LACTATE SERPL-SCNC: 0.7 MMOL/L (ref 0.7–2)
LIPASE SERPL-CCNC: 24 U/L (ref 13–60)
LYMPHOCYTES # BLD AUTO: 2.4 10E3/UL (ref 0.8–5.3)
LYMPHOCYTES NFR BLD AUTO: 37 %
MCH RBC QN AUTO: 31.2 PG (ref 26.5–33)
MCHC RBC AUTO-ENTMCNC: 33.6 G/DL (ref 31.5–36.5)
MCV RBC AUTO: 93 FL (ref 78–100)
MONOCYTES # BLD AUTO: 0.5 10E3/UL (ref 0–1.3)
MONOCYTES NFR BLD AUTO: 8 %
NEUTROPHILS # BLD AUTO: 3.5 10E3/UL (ref 1.6–8.3)
NEUTROPHILS NFR BLD AUTO: 53 %
NRBC # BLD AUTO: 0 10E3/UL
NRBC BLD AUTO-RTO: 0 /100
PLATELET # BLD AUTO: 260 10E3/UL (ref 150–450)
POTASSIUM SERPL-SCNC: 4 MMOL/L (ref 3.4–5.3)
PROT SERPL-MCNC: 7.4 G/DL (ref 6.4–8.3)
RBC # BLD AUTO: 3.85 10E6/UL (ref 3.8–5.2)
SODIUM SERPL-SCNC: 140 MMOL/L (ref 135–145)
TSH SERPL DL<=0.005 MIU/L-ACNC: 0.9 UIU/ML (ref 0.3–4.2)
WBC # BLD AUTO: 6.6 10E3/UL (ref 4–11)

## 2025-01-13 PROCEDURE — 83605 ASSAY OF LACTIC ACID: CPT | Performed by: EMERGENCY MEDICINE

## 2025-01-13 PROCEDURE — 99285 EMERGENCY DEPT VISIT HI MDM: CPT | Mod: 25

## 2025-01-13 PROCEDURE — 84703 CHORIONIC GONADOTROPIN ASSAY: CPT | Performed by: EMERGENCY MEDICINE

## 2025-01-13 PROCEDURE — 84100 ASSAY OF PHOSPHORUS: CPT | Performed by: EMERGENCY MEDICINE

## 2025-01-13 PROCEDURE — 96375 TX/PRO/DX INJ NEW DRUG ADDON: CPT

## 2025-01-13 PROCEDURE — 96374 THER/PROPH/DIAG INJ IV PUSH: CPT | Mod: 59

## 2025-01-13 PROCEDURE — 83690 ASSAY OF LIPASE: CPT | Performed by: EMERGENCY MEDICINE

## 2025-01-13 PROCEDURE — 74174 CTA ABD&PLVS W/CONTRAST: CPT

## 2025-01-13 PROCEDURE — 85025 COMPLETE CBC W/AUTO DIFF WBC: CPT | Performed by: EMERGENCY MEDICINE

## 2025-01-13 PROCEDURE — 250N000011 HC RX IP 250 OP 636: Performed by: EMERGENCY MEDICINE

## 2025-01-13 PROCEDURE — 84443 ASSAY THYROID STIM HORMONE: CPT | Performed by: EMERGENCY MEDICINE

## 2025-01-13 PROCEDURE — 250N000013 HC RX MED GY IP 250 OP 250 PS 637: Performed by: EMERGENCY MEDICINE

## 2025-01-13 PROCEDURE — 80053 COMPREHEN METABOLIC PANEL: CPT | Performed by: EMERGENCY MEDICINE

## 2025-01-13 PROCEDURE — 36415 COLL VENOUS BLD VENIPUNCTURE: CPT | Performed by: EMERGENCY MEDICINE

## 2025-01-13 PROCEDURE — 250N000009 HC RX 250: Performed by: EMERGENCY MEDICINE

## 2025-01-13 PROCEDURE — 96376 TX/PRO/DX INJ SAME DRUG ADON: CPT

## 2025-01-13 PROCEDURE — 83735 ASSAY OF MAGNESIUM: CPT | Performed by: EMERGENCY MEDICINE

## 2025-01-13 RX ORDER — KETOROLAC TROMETHAMINE 15 MG/ML
15 INJECTION, SOLUTION INTRAMUSCULAR; INTRAVENOUS ONCE
Status: COMPLETED | OUTPATIENT
Start: 2025-01-13 | End: 2025-01-13

## 2025-01-13 RX ORDER — IOPAMIDOL 755 MG/ML
500 INJECTION, SOLUTION INTRAVASCULAR ONCE
Status: COMPLETED | OUTPATIENT
Start: 2025-01-13 | End: 2025-01-13

## 2025-01-13 RX ORDER — OXYCODONE HYDROCHLORIDE 5 MG/1
5 TABLET ORAL ONCE
Status: COMPLETED | OUTPATIENT
Start: 2025-01-13 | End: 2025-01-13

## 2025-01-13 RX ORDER — HYDROMORPHONE HYDROCHLORIDE 1 MG/ML
0.5 INJECTION, SOLUTION INTRAMUSCULAR; INTRAVENOUS; SUBCUTANEOUS ONCE
Status: COMPLETED | OUTPATIENT
Start: 2025-01-13 | End: 2025-01-13

## 2025-01-13 RX ORDER — ONDANSETRON 2 MG/ML
4 INJECTION INTRAMUSCULAR; INTRAVENOUS ONCE
Status: COMPLETED | OUTPATIENT
Start: 2025-01-13 | End: 2025-01-13

## 2025-01-13 RX ADMIN — OXYCODONE HYDROCHLORIDE 5 MG: 5 TABLET ORAL at 20:36

## 2025-01-13 RX ADMIN — SODIUM CHLORIDE 57 ML: 9 INJECTION, SOLUTION INTRAVENOUS at 21:51

## 2025-01-13 RX ADMIN — HYDROMORPHONE HYDROCHLORIDE 0.5 MG: 1 INJECTION, SOLUTION INTRAMUSCULAR; INTRAVENOUS; SUBCUTANEOUS at 22:06

## 2025-01-13 RX ADMIN — ONDANSETRON 4 MG: 2 INJECTION INTRAMUSCULAR; INTRAVENOUS at 23:40

## 2025-01-13 RX ADMIN — HYDROMORPHONE HYDROCHLORIDE 1 MG: 1 INJECTION, SOLUTION INTRAMUSCULAR; INTRAVENOUS; SUBCUTANEOUS at 23:35

## 2025-01-13 RX ADMIN — KETOROLAC TROMETHAMINE 15 MG: 15 INJECTION, SOLUTION INTRAMUSCULAR; INTRAVENOUS at 21:23

## 2025-01-13 RX ADMIN — IOPAMIDOL 51 ML: 755 INJECTION, SOLUTION INTRAVENOUS at 21:51

## 2025-01-13 RX ADMIN — FAMOTIDINE 20 MG: 10 INJECTION, SOLUTION INTRAVENOUS at 23:35

## 2025-01-13 ASSESSMENT — ACTIVITIES OF DAILY LIVING (ADL)
ADLS_ACUITY_SCORE: 41

## 2025-01-14 ENCOUNTER — TELEPHONE (OUTPATIENT)
Dept: PEDIATRICS | Facility: CLINIC | Age: 20
End: 2025-01-14
Payer: COMMERCIAL

## 2025-01-14 VITALS
HEART RATE: 90 BPM | RESPIRATION RATE: 16 BRPM | WEIGHT: 104.2 LBS | DIASTOLIC BLOOD PRESSURE: 65 MMHG | HEIGHT: 63 IN | OXYGEN SATURATION: 99 % | BODY MASS INDEX: 18.46 KG/M2 | TEMPERATURE: 98.2 F | SYSTOLIC BLOOD PRESSURE: 108 MMHG

## 2025-01-14 PROBLEM — K55.1 SMAS (SUPERIOR MESENTERIC ARTERY SYNDROME): Status: ACTIVE | Noted: 2024-12-31

## 2025-01-14 PROBLEM — R10.10 UPPER ABDOMINAL PAIN: Status: ACTIVE | Noted: 2025-01-14

## 2025-01-14 PROBLEM — R11.10 POSTPRANDIAL VOMITING: Status: ACTIVE | Noted: 2025-01-14

## 2025-01-14 PROBLEM — R63.8 DECREASED ORAL INTAKE: Status: ACTIVE | Noted: 2025-01-14

## 2025-01-14 LAB
MAGNESIUM SERPL-MCNC: 1.9 MG/DL (ref 1.7–2.3)
PHOSPHATE SERPL-MCNC: 3.5 MG/DL (ref 2.5–4.5)

## 2025-01-14 PROCEDURE — G0378 HOSPITAL OBSERVATION PER HR: HCPCS

## 2025-01-14 PROCEDURE — 250N000011 HC RX IP 250 OP 636: Performed by: EMERGENCY MEDICINE

## 2025-01-14 PROCEDURE — 96376 TX/PRO/DX INJ SAME DRUG ADON: CPT

## 2025-01-14 PROCEDURE — 250N000011 HC RX IP 250 OP 636: Performed by: INTERNAL MEDICINE

## 2025-01-14 PROCEDURE — 96375 TX/PRO/DX INJ NEW DRUG ADDON: CPT

## 2025-01-14 PROCEDURE — 258N000003 HC RX IP 258 OP 636: Performed by: INTERNAL MEDICINE

## 2025-01-14 PROCEDURE — 250N000013 HC RX MED GY IP 250 OP 250 PS 637: Performed by: INTERNAL MEDICINE

## 2025-01-14 RX ORDER — HALOPERIDOL 5 MG/ML
2 INJECTION INTRAMUSCULAR ONCE
Status: COMPLETED | OUTPATIENT
Start: 2025-01-14 | End: 2025-01-14

## 2025-01-14 RX ORDER — PREGABALIN 25 MG/1
25 CAPSULE ORAL AT BEDTIME
Status: DISCONTINUED | OUTPATIENT
Start: 2025-01-14 | End: 2025-01-14 | Stop reason: HOSPADM

## 2025-01-14 RX ORDER — OXYCODONE HYDROCHLORIDE 10 MG/1
10 TABLET ORAL EVERY 4 HOURS PRN
Status: DISCONTINUED | OUTPATIENT
Start: 2025-01-14 | End: 2025-01-14 | Stop reason: HOSPADM

## 2025-01-14 RX ORDER — PREGABALIN 25 MG/1
25 CAPSULE ORAL AT BEDTIME
COMMUNITY

## 2025-01-14 RX ORDER — POLYETHYLENE GLYCOL 3350 17 G/17G
17 POWDER, FOR SOLUTION ORAL 2 TIMES DAILY PRN
Status: DISCONTINUED | OUTPATIENT
Start: 2025-01-14 | End: 2025-01-14 | Stop reason: HOSPADM

## 2025-01-14 RX ORDER — ASCORBIC ACID 500 MG
500 TABLET ORAL DAILY
COMMUNITY

## 2025-01-14 RX ORDER — SODIUM CHLORIDE, SODIUM LACTATE, POTASSIUM CHLORIDE, CALCIUM CHLORIDE 600; 310; 30; 20 MG/100ML; MG/100ML; MG/100ML; MG/100ML
INJECTION, SOLUTION INTRAVENOUS CONTINUOUS
Status: DISCONTINUED | OUTPATIENT
Start: 2025-01-14 | End: 2025-01-14 | Stop reason: HOSPADM

## 2025-01-14 RX ORDER — NALOXONE HYDROCHLORIDE 0.4 MG/ML
0.2 INJECTION, SOLUTION INTRAMUSCULAR; INTRAVENOUS; SUBCUTANEOUS
Status: DISCONTINUED | OUTPATIENT
Start: 2025-01-14 | End: 2025-01-14 | Stop reason: HOSPADM

## 2025-01-14 RX ORDER — AMOXICILLIN 250 MG
1 CAPSULE ORAL 2 TIMES DAILY PRN
Status: DISCONTINUED | OUTPATIENT
Start: 2025-01-14 | End: 2025-01-14 | Stop reason: HOSPADM

## 2025-01-14 RX ORDER — PROCHLORPERAZINE MALEATE 10 MG
10 TABLET ORAL EVERY 6 HOURS PRN
Status: DISCONTINUED | OUTPATIENT
Start: 2025-01-14 | End: 2025-01-14 | Stop reason: HOSPADM

## 2025-01-14 RX ORDER — ONDANSETRON 4 MG/1
4 TABLET, ORALLY DISINTEGRATING ORAL EVERY 6 HOURS PRN
Status: DISCONTINUED | OUTPATIENT
Start: 2025-01-14 | End: 2025-01-14 | Stop reason: HOSPADM

## 2025-01-14 RX ORDER — HYDROMORPHONE HYDROCHLORIDE 1 MG/ML
0.5 INJECTION, SOLUTION INTRAMUSCULAR; INTRAVENOUS; SUBCUTANEOUS
Status: DISCONTINUED | OUTPATIENT
Start: 2025-01-14 | End: 2025-01-14 | Stop reason: HOSPADM

## 2025-01-14 RX ORDER — NALOXONE HYDROCHLORIDE 0.4 MG/ML
0.4 INJECTION, SOLUTION INTRAMUSCULAR; INTRAVENOUS; SUBCUTANEOUS
Status: DISCONTINUED | OUTPATIENT
Start: 2025-01-14 | End: 2025-01-14 | Stop reason: HOSPADM

## 2025-01-14 RX ORDER — ONDANSETRON 2 MG/ML
4 INJECTION INTRAMUSCULAR; INTRAVENOUS EVERY 6 HOURS PRN
Status: DISCONTINUED | OUTPATIENT
Start: 2025-01-14 | End: 2025-01-14 | Stop reason: HOSPADM

## 2025-01-14 RX ORDER — NORTRIPTYLINE HYDROCHLORIDE 25 MG/1
25 CAPSULE ORAL AT BEDTIME
Status: DISCONTINUED | OUTPATIENT
Start: 2025-01-14 | End: 2025-01-14 | Stop reason: HOSPADM

## 2025-01-14 RX ORDER — AMOXICILLIN 250 MG
2 CAPSULE ORAL 2 TIMES DAILY PRN
Status: DISCONTINUED | OUTPATIENT
Start: 2025-01-14 | End: 2025-01-14 | Stop reason: HOSPADM

## 2025-01-14 RX ORDER — OXYCODONE HYDROCHLORIDE 5 MG/1
5 TABLET ORAL EVERY 4 HOURS PRN
Status: DISCONTINUED | OUTPATIENT
Start: 2025-01-14 | End: 2025-01-14 | Stop reason: HOSPADM

## 2025-01-14 RX ORDER — DROSPIRENONE 4 MG/1
1 TABLET, FILM COATED ORAL
Status: ON HOLD | COMMUNITY
Start: 2025-01-02 | End: 2025-01-14

## 2025-01-14 RX ADMIN — ESCITALOPRAM OXALATE 7.5 MG: 5 TABLET, FILM COATED ORAL at 12:16

## 2025-01-14 RX ADMIN — ONDANSETRON 4 MG: 2 INJECTION INTRAMUSCULAR; INTRAVENOUS at 04:14

## 2025-01-14 RX ADMIN — SODIUM CHLORIDE, POTASSIUM CHLORIDE, SODIUM LACTATE AND CALCIUM CHLORIDE: 600; 310; 30; 20 INJECTION, SOLUTION INTRAVENOUS at 03:02

## 2025-01-14 RX ADMIN — HALOPERIDOL LACTATE 2 MG: 5 INJECTION, SOLUTION INTRAMUSCULAR at 00:48

## 2025-01-14 ASSESSMENT — ACTIVITIES OF DAILY LIVING (ADL)
ADLS_ACUITY_SCORE: 22
ADLS_ACUITY_SCORE: 18
ADLS_ACUITY_SCORE: 22
ADLS_ACUITY_SCORE: 41
ADLS_ACUITY_SCORE: 22
ADLS_ACUITY_SCORE: 42
ADLS_ACUITY_SCORE: 22
ADLS_ACUITY_SCORE: 18
ADLS_ACUITY_SCORE: 18
ADLS_ACUITY_SCORE: 22
ADLS_ACUITY_SCORE: 41
ADLS_ACUITY_SCORE: 22
ADLS_ACUITY_SCORE: 22
ADLS_ACUITY_SCORE: 18
ADLS_ACUITY_SCORE: 18
ADLS_ACUITY_SCORE: 41

## 2025-01-14 NOTE — PLAN OF CARE
[facility]    ED Boarding Nurse Handoff Addendum Report:    Date/time: 1/14/2025, 3:10 AM    Activity: are independent with no assistive devices   Telemetry Monitoring: No  Pain: complaining of 7/10 pain in their abdomen. Given meds in intake, none since current RN care.  LDA's: Peripheral  Fluids: has Lactated Ringer's running at 75 mL per hour.  Diet: Regular    Plan of Care:  Pain management. Oxygen monitoring.       Vital signs (within last 30 minutes):    Vitals:    01/14/25 0209 01/14/25 0214 01/14/25 0219 01/14/25 0306   BP:    104/59   BP Location:    Right arm   Patient Position:    Supine   Cuff Size:    Adult Small   Pulse:    77   Resp:    16   Temp:    98.5  F (36.9  C)   TempSrc:    Oral   SpO2: 100% 100% 100% 99%   Weight:       Height:         ED Boarding Nurse name: Renuka Garcia RN

## 2025-01-14 NOTE — PROGRESS NOTES
Brief update:    Seems to be feeling better this morning.  Not requiring as many pain medications.  No ongoing vomiting but has also had no oral intake.      ADAT today.  If able to tolerate soft foods by this afternoon without any worsening of her symptoms, then stable for discharge.     Discussed with bedside nurse.    Reno Hernandez, DO

## 2025-01-14 NOTE — PHARMACY-ADMISSION MEDICATION HISTORY
Pharmacist Admission Medication History    Admission medication history is complete. The information provided in this note is only as accurate as the sources available at the time of the update.    Information Source(s): Patient via in-person    Pertinent Information:      Changes made to PTA medication list:  Added: Vitamin C  Deleted: amitiza, Narcan nasal spray, oxycodone, miralax, slynd  Changed: None    Allergies reviewed with patient and updates made in EHR: yes    Medication History Completed By: Cyndee Mccullough [facility] 1/14/2025 11:47 AM    PTA Med List   Medication Sig Last Dose/Taking    escitalopram (LEXAPRO) 5 MG tablet Take 1.5 tablets (7.5 mg) by mouth daily. 1/13/2025 Morning    JENCYCLA 0.35 MG tablet Take 1 tablet by mouth daily at 2 pm 1/13/2025 Morning    nortriptyline (PAMELOR) 25 MG capsule Take 25 mg by mouth at bedtime. 1/12/2025 Bedtime    ondansetron (ZOFRAN ODT) 4 MG ODT tab Take 1-2 tablets (4-8 mg) by mouth every 8 hours as needed for nausea Taking As Needed    pregabalin (LYRICA) 25 MG capsule Take 25 mg by mouth at bedtime. 1/12/2025 Bedtime    SUMAtriptan (IMITREX) 50 MG tablet TAKE 1 TABLET BY MOUTH AT ONSET OF MIGRAINE; MAY TAKE 1 ADDITIONAL TABLET IN 2 HRS IF NEEDED. MAX DOSE  MG/DAY  MG/WEEK.* Taking    vitamin C (ASCORBIC ACID) 500 MG tablet Take 500 mg by mouth daily. 1/13/2025 Morning

## 2025-01-14 NOTE — TELEPHONE ENCOUNTER
Prior Authorization Retail Medication Request    Medication/Dose: escitalopram (LEXAPRO) 5 mg  Summary:  Take 1.5 tablets (7.5 mg) by mouth daily., Disp-135 tablet         Insurance   Primary: [facility]  Insurance ID:  [id number]             Clinic Information  Preferred routing pool for dept communication: [facility]

## 2025-01-14 NOTE — ED TRIAGE NOTES
Presents with LUQ abdominal pain after eating today. History of nutcracker syndrome, SMAS, and MTS; vascular compression issues that have caused long-term pain. Pain is much worse tonight at 8/10.    See CT from 11/12/2024 as this was when diagnosis was made.

## 2025-01-14 NOTE — PLAN OF CARE
PRIMARY DIAGNOSIS: abdominal pain     OUTPATIENT/OBSERVATION GOALS TO BE MET BEFORE DISCHARGE  1. Orthostatic performed: N/A    2. Tolerating PO fluid and/or antibiotics (if applicable): Yes    3. Nausea/Vomiting/Diarrhea symptoms improved: No,  n/a    4. Pain status: Pain free.    5. Return to near baseline physical activity: Yes    Discharge Planner Nurse   Safe discharge environment identified: Yes  Barriers to discharge: Yes, has to be able to tolerate diet        Entered by: Luis Fernando Alcantar RN 01/14/2025 2:05 PM     Please review provider order for any additional goals.   Nurse to notify provider when observation goals have been met and patient is ready for discharge.

## 2025-01-14 NOTE — PLAN OF CARE
"Goal Outcome Evaluation:      Plan of Care Reviewed With: patient    Overall Patient Progress: improvingOverall Patient Progress: improving    Outcome Evaluation: Discharged with all of her belongings and AVS    Patient's After Visit Summary was reviewed with patient and/or mother.   Patient verbalized understanding of After Visit Summary, recommended follow up and was given an opportunity to ask questions.   Discharge medications sent home with patient/family: Not applicable   Discharged with mother        Problem: Adult Inpatient Plan of Care  Goal: Plan of Care Review  Description: The Plan of Care Review/Shift note should be completed every shift.  The Outcome Evaluation is a brief statement about your assessment that the patient is improving, declining, or no change.  This information will be displayed automatically on your shift  note.  Outcome: Adequate for Care Transition  Flowsheets (Taken 1/14/2025 2477)  Outcome Evaluation: Discharged with all of her belongings and AVS  Plan of Care Reviewed With: patient  Overall Patient Progress: improving  Goal: Patient-Specific Goal (Individualized)  Description: You can add care plan individualizations to a care plan. Examples of Individualization might be:  \"Parent requests to be called daily at 9am for status\", \"I have a hard time hearing out of my right ear\", or \"Do not touch me to wake me up as it startles  me\".  Outcome: Adequate for Care Transition  Goal: Absence of Hospital-Acquired Illness or Injury  Outcome: Adequate for Care Transition  Goal: Optimal Comfort and Wellbeing  Outcome: Adequate for Care Transition  Goal: Readiness for Transition of Care  Outcome: Adequate for Care Transition         "

## 2025-01-14 NOTE — H&P
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  Had very little p.o. intake over the last 3 days prior to eating the large sandwich.  She has been seeing [facility] for her chronic symptoms that have been present for the last 2 years.  She underwent EGD and gastric emptying study that were normal in 11/2024.  CT angiography done as an outpatient reported possible SMA and nutcracker syndrome.  HIDA scan was just done that shows rapid contract trial response to CCK challenge which could be due to biliary hyperkinesis, but I doubt this is the etiology for abdominal pain.  She is following up with a vascular specialist in [place] on Thursday.  BMI 18.0.  In the ER her vitals are stable.  CBC, CMP, lipase, lactic acid WNL except for a bicarb of 21.  Serum hCG negative.  CTA abdomen pelvis in the ER shows mild narrowing of the common hepatic artery origin and severe narrowing of the splenic artery origin by the median arcuate ligament without vascular congestion to support diagnosis of median arcuate ligament syndrome.  CT shows that the aortomesenteric angle is somewhat acute, but the transverse colon immediately proximal is decompressed without significant ST extension to confirm diagnosis of SMA syndrome.  Received IV Dilaudid x 2, Haldol, Toradol, oxycodone, and Zofran in the ER but does not feel her pain and nausea are adequately controlled at this time.  Other than temporary pain control and antiemetics I do not feel that we can offer further  evaluation or directed treatment for her underlying issue which was explained to the patient and her mother who voiced understanding.  -Acetaminophen for mild pain, oxycodone 5 mg for moderate and 10 mg for severe pain every 4 hours as needed, IV Dilaudid 0.5 mg every 2 hours as needed for severe acute pain if cannot take p.o.  -IV Zofran and IV Compazine as needed for nausea  -Regular diet as tolerated  -LR at 75 mL/h until tolerating p.o.  -Resume PTA nortriptyline 25 mg at bedtime and Lyrica 25 mg at bedtime if hospitalized overnight again  -Has outpatient vascular specialist evaluation in [place] on Thursday  -Recommend protein shakes (boost, Ensure, etc.) to improve caloric intake at home, reports she is already doing this    Anxiety: Resume PTA escitalopram 7.5 mg daily.    Migraines: Uses Imitrex as needed.    DVT Prophylaxis: Low Risk/Ambulatory with no VTE prophylaxis indicated  Code Status: Full Code  FEN: Regular diet as tolerated, LR at 75 mL/h until tolerating p.o.  Discharge Dispo: Home  Estimated Disch Date / # of Days until Disch: Admit observation for temporary pain/nausea control.  Anticipate discharge later today when symptoms improved if she can eat and drink some.      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  Presenting with acute on chronic left upper quadrant abdominal pain and 1 episode of vomiting after eating a large sandwich earlier today.  Had very little p.o. intake over the last 3 days prior to eating the large sandwich.  She has been seeing [facility] for her chronic symptoms that have been present for the last 2 years.  She underwent EGD and gastric emptying study that were normal in 11/2024.  CT angiography done as an outpatient reported possible SMA and nutcracker  syndrome.  HIDA scan was just done that shows rapid contract trial response to CCK challenge which could be due to biliary hyperkinesis, but I doubt this is the etiology for abdominal pain.  She is following up with a vascular specialist in [place] on Thursday.  Due to her pain being more intense than normal along with a episode of vomiting she came in for evaluation.    History obtained from patient, patient's mother, medical record, and from Dr. Vallecillo in the emergency department.  Blood pressure 113/93 with repeat 111/67, heart rate 68, temperature 98.2  F, oxygen 97% on room air.  Her bicarb is slightly low at 21 otherwise her CBC, CMP, lipase, lactate acid all within normal limits.  Serum hCG is negative.  CTA abdomen pelvis in the ER shows mild narrowing of the common hepatic artery origin and severe narrowing of the splenic artery origin by the median arcuate ligament without vascular congestion to support diagnosis of median arcuate ligament syndrome.  CT shows that the aortomesenteric angle is somewhat acute, but the transverse colon immediately proximal is decompressed without significant ST extension to confirm diagnosis of SMA syndrome.  Received IV Dilaudid x 2, Haldol, Toradol, oxycodone, and Zofran in the ER but does not feel her pain and nausea are adequately controlled at this time.  Other than temporary pain control and antiemetics I do not feel that we can offer further evaluation or directed treatment for her underlying issue which was explained to the patient and her mother who voiced understanding.  Admit observation.       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  Anxiety  Migraines    Past Surgical History  reviewed:  Past Surgical History:   Procedure Laterality Date    APPENDECTOMY      EP STUDY TILT TABLE N/A 5/8/2024    Procedure: Tilt Table Study;  Surgeon: Issac Jiménez MD;  Location:  [facility]    ESOPHAGOSCOPY, GASTROSCOPY, DUODENOSCOPY (EGD), COMBINED N/A 6/13/2024    Procedure: Esophagoscopy, gastroscopy, duodenoscopy (EGD), combined with biopsies with biopsy forceps;  Surgeon: Berhane Eastman MD;  Location: [facility]    Labral tear repair Left 01/2022     Social History reviewed:  Social History     Tobacco Use    Smoking status: Never     Passive exposure: Never    Smokeless tobacco: Never   Substance Use Topics    Alcohol use: No     Social History     Social History Narrative    Not on file     Family History reviewed:  Family History   Problem Relation Age of Onset    Hypothyroidism Mother     Unknown/Adopted Mother     Anesthesia Reaction Mother     Thyroid Disease Mother         Hashimoto's    Hypothyroidism Father     Thyroid Disease Father         Hypothyroidism and vitiligo    Hypothyroidism Maternal Grandmother     Thyroid Disease Maternal Grandmother         Hypothyroidism    Cerebrovascular Disease Maternal Grandfather         2014    Prostate Cancer Maternal Grandfather     Hypothyroidism Paternal Grandmother     Thyroid Disease Paternal Grandmother         Hyposthyroidism    Coronary Artery Disease Paternal Grandfather     Hypertension Paternal Grandfather     Anxiety Disorder Paternal Grandfather     Gastrointestinal Disease Brother 5        celiac disease    Hypothyroidism Brother     Asthma Brother     Genetic Disorder Brother         Celiac Disease    Thyroid Disease Brother         Autoimmune Hypothyroidism    Genetic Disorder Cousin         JRA    Breast Cancer Other         Aunt    Colon Cancer No family hx of      Allergies:  Allergies   Allergen Reactions    No Known Drug Allergy      Medications:  Prior to Admission medications    Medication Sig Last Dose Taking?  "Auth Provider Long Term End Date   escitalopram (LEXAPRO) 5 MG tablet Take 1.5 tablets (7.5 mg) by mouth daily.   Maria Elena Albarado MD Yes    JENCYCLA 0.35 MG tablet Take 1 tablet by mouth daily at 2 pm   Reported, Patient Yes    lubiprostone (AMITIZA) 8 MCG capsule Take 8 mcg by mouth daily.   Reported, Patient     naloxone (NARCAN) 4 MG/0.1ML nasal spray Spray 1 spray (4 mg) into one nostril alternating nostrils as needed for opioid reversal. every 2-3 minutes until assistance arrives  Patient not taking: Reported on 12/30/2024   Alena Do NP Yes    nortriptyline (PAMELOR) 25 MG capsule Take 25 mg by mouth at bedtime.   Reported, Patient No    ondansetron (ZOFRAN ODT) 4 MG ODT tab Take 1-2 tablets (4-8 mg) by mouth every 8 hours as needed for nausea   Maria Elena Albarado MD     oxyCODONE (ROXICODONE) 5 MG tablet Take 1 tablet (5 mg) by mouth every 6 hours as needed for severe pain (max of 2 tablets per day).  Patient not taking: Reported on 12/30/2024   Alena Do NP     polyethylene glycol (MIRALAX) 17 g packet Take 8-9 g by mouth daily as needed for constipation   Reported, Patient     SUMAtriptan (IMITREX) 50 MG tablet TAKE 1 TABLET BY MOUTH AT ONSET OF MIGRAINE; MAY TAKE 1 ADDITIONAL TABLET IN 2 HRS IF NEEDED. MAX DOSE  MG/DAY  MG/WEEK.*   Reported, Patient No      Review of Systems:  A Comprehensive greater than 10 system review of systems was carried out.  Pertinent positives and negatives are noted above.  Otherwise negative.     Physical Exam:  Blood pressure 111/67, pulse 74, temperature 98.2  F (36.8  C), temperature source Temporal, resp. rate 18, height 1.6 m (5' 3\"), weight 46.3 kg (102 lb), last menstrual period 12/24/2024, SpO2 99%, not currently breastfeeding.  Wt Readings from Last 1 Encounters:   01/13/25 46.3 kg (102 lb)     Exam:  Constitutional: NAD  Eyes: sclera white  HEENT: MMM  Respiratory: no respiratory distress, anterior lungs cta bilaterally, no " crackles or wheeze  Cardiovascular: RRR.  No murmur  GI: Thin, soft, not distended, bowel sounds present, mild upper abdominal tenderness to palpation without guarding  Skin: no rash   Musculoskeletal/extremities:   No edema  Neurologic: Slightly somnolent, speech clear  Psychiatric: calm, cooperative     Lab and imaging data personally reviewed:  Labs:  Recent Labs   Lab 01/13/25 2033   WBC 6.6   HGB 12.0   HCT 35.7   MCV 93        Recent Labs   Lab 01/13/25 2033      POTASSIUM 4.0   CHLORIDE 105   CO2 21*   ANIONGAP 14   GLC 94   BUN 16.2   CR 0.63   GFRESTIMATED >90   CHRISSIE 9.3   MAG 1.9   PHOS 3.5   PROTTOTAL 7.4   ALBUMIN 4.4   BILITOTAL 0.2   ALKPHOS 63   AST 24   ALT 11     Recent Labs   Lab 01/13/25 2033   LIPASE 24     Serum hCG negative  TSH 0.9    Imaging:  Recent Results (from the past 24 hours)   CTA Abdomen Pelvis with Contrast    Narrative    EXAM: CTA ABDOMEN PELVIS WITH CONTRAST  LOCATION: [facility]  DATE: 1/13/2025    INDICATION: History of SMA syndrome, presents with worsening pain and now constant after eating this evening.  COMPARISON: 11/12/2024  TECHNIQUE: CT angiogram abdomen pelvis during arterial phase of injection of IV contrast. 2D and 3D MIP reconstructions were performed by the CT technologist. Dose reduction techniques were used.  CONTRAST: 51 mL Isovue 370    FINDINGS:  ANGIOGRAM ABDOMEN/PELVIS: Independent takeoff of the common hepatic and splenic arteries from the aorta. Mild narrowing at the common hepatic artery origin and severe narrowing at the origin of the splenic artery due to compression by median arcuate   ligament. No prominent arterial collateralization in the upper abdomen. SMA is patent with somewhat acute aortomesenteric angle and narrowed aortomesenteric window, unchanged. However, there is no distention of the proximal transverse duodenum   immediately proximal to the SMA level. No significant distention of the stomach. Abdominal  aorta, single bilateral renal arteries, ELAYNE and bilateral iliofemoral arteries are normally patent. No vascular atherosclerosis.    LOWER CHEST: Normal.    HEPATOBILIARY: Normal liver and gallbladder.    PANCREAS: Normal.    SPLEEN: Normal.    ADRENAL GLANDS: Normal.    KIDNEYS/BLADDER: Normal.    BOWEL: Normal. No free fluid or free air. Appendix is surgically absent.    LYMPH NODES: Normal.    PELVIC ORGANS: Normal.    MUSCULOSKELETAL: Normal.        Impression    IMPRESSION:    1.  Independent takeoff of the common hepatic artery and splenic artery from the abdominal aorta, with mild narrowing of the common hepatic artery origin and severe narrowing of the splenic artery origin by the median arcuate ligament. However, no   prominent vascular collateralization is seen in the upper abdomen to support diagnosis of median arcuate ligament syndrome.    2.  Although the aortomesenteric angle is somewhat acute with mildly narrowed aortomesenteric window, the transverse colon immediately proximal to the SMA is decompressed on this study and without significant gastric distention to confirm diagnosis of   SMA syndrome.  Barium meal under fluoroscopic observation would be more definitive for diagnosis of SMA syndrome.    3.  No definitive findings identified on this study to explain patient's symptoms.       Narinder Alves MD  Hospitalist  [facility]

## 2025-01-14 NOTE — ED NOTES
[facility]  ED Nurse Handoff Report    ED Chief complaint: Abdominal Pain  . ED Diagnosis:   Final diagnoses:   Postprandial vomiting   Upper abdominal pain   SMAS (superior mesenteric artery syndrome)   Decreased oral intake       Allergies:   Allergies   Allergen Reactions    No Known Drug Allergy        Code Status: Full Code    Activity level - Baseline/Home:  independent.  Activity Level - Current:   standby.   Lift room needed: No.   Bariatric: No   Needed: No   Isolation: No.   Infection: Not Applicable.     Respiratory status: Room air    Vital Signs (within 30 minutes):   Vitals:    01/14/25 0204 01/14/25 0209 01/14/25 0214 01/14/25 0219   BP:       Pulse:       Resp:       Temp:       TempSrc:       SpO2: 100% 100% 100% 100%   Weight:       Height:           Cardiac Rhythm:  ,      Pain level:    Patient confused: No.   Patient Falls Risk: bed/chair alarm on, arm band in place, and patient and family education.   Elimination Status: Has voided     Patient Report - Initial Complaint: Presents with LUQ abdominal pain after eating today. History of nutcracker syndrome, SMAS, and MTS; vascular compression issues that have caused long-term pain. Pain is much worse tonight at 8/10.           See CT from 11/12/2024 as this was when diagnosis was made.      Focused Assessment: Aniyah Curry is a 19 year old female with a history of superior mesenteric artery syndrome presenting to the ED for the evaluation of abdominal pain. The patient states that she began experiencing worsening left-sided abdominal pain since eating lunch earlier today. She states that this is where she often experienced abdominal pain but that today's pain has been worse than what she's used to. She also endorses decreased appetite recently. Jeny denies diarrhea, bloody stools, constipation, lightheadedness, dizziness, or vomiting. She states that she primarily takes pregabalin for pain relief as well as  occasional ibuprofen which is less helpful for her. She denies current use of omeprazole.  The concern for SMA syndrome was based on a CT scan in November and they have follow-up with a vascular specialist in [place] coming up later this week.  There was a shorter wait time there then at [place].  Works with [facility] and had a recent HIDA scan and saw the results on her phone but does not know how to interpret them.  Last endoscopy was in November.     Later during her ED course she gave more details stating that she has not had much of an appetite or wanted to eat anything for the past 3 days.  She knows it has important to eat and she went to [employer] and had a very large sandwich today.  She then started having pain afterwards.  Her mom states that she had vomited after this and she thought it would make her feel better but it did not.  In the past large meals have been a problem.  They have also met with a nutritionist who notes that she is not underweight.  They are trying protein shakes but she is often just not interested in eating it and her mom notes that she has more of an aversion to eating now because of the pain that is associated with it.  Has been on different medications including Lyrica in the past.       Abnormal Results:   Labs Ordered and Resulted from Time of ED Arrival to Time of ED Departure   COMPREHENSIVE METABOLIC PANEL - Abnormal       Result Value    Sodium 140      Potassium 4.0      Carbon Dioxide (CO2) 21 (*)     Anion Gap 14      Urea Nitrogen 16.2      Creatinine 0.63      GFR Estimate >90      Calcium 9.3      Chloride 105      Glucose 94      Alkaline Phosphatase 63      AST 24      ALT 11      Protein Total 7.4      Albumin 4.4      Bilirubin Total 0.2     LIPASE - Normal    Lipase 24     LACTIC ACID WHOLE BLOOD WITH 1X REPEAT IN 2 HR WHEN >2 - Normal    Lactic Acid, Initial 0.7     HCG QUALITATIVE PREGNANCY - Normal    hCG Serum Qualitative Negative     TSH WITH FREE T4 REFLEX -  Normal    TSH 0.90     MAGNESIUM - Normal    Magnesium 1.9     PHOSPHORUS - Normal    Phosphorus 3.5     CBC WITH PLATELETS AND DIFFERENTIAL    WBC Count 6.6      RBC Count 3.85      Hemoglobin 12.0      Hematocrit 35.7      MCV 93      MCH 31.2      MCHC 33.6      RDW 12.3      Platelet Count 260      % Neutrophils 53      % Lymphocytes 37      % Monocytes 8      % Eosinophils 1      % Basophils 1      % Immature Granulocytes 0      NRBCs per 100 WBC 0      Absolute Neutrophils 3.5      Absolute Lymphocytes 2.4      Absolute Monocytes 0.5      Absolute Eosinophils 0.1      Absolute Basophils 0.0      Absolute Immature Granulocytes 0.0      Absolute NRBCs 0.0          CTA Abdomen Pelvis with Contrast   Final Result   IMPRESSION:      1.  Independent takeoff of the common hepatic artery and splenic artery from the abdominal aorta, with mild narrowing of the common hepatic artery origin and severe narrowing of the splenic artery origin by the median arcuate ligament. However, no    prominent vascular collateralization is seen in the upper abdomen to support diagnosis of median arcuate ligament syndrome.      2.  Although the aortomesenteric angle is somewhat acute with mildly narrowed aortomesenteric window, the transverse colon immediately proximal to the SMA is decompressed on this study and without significant gastric distention to confirm diagnosis of    SMA syndrome.  Barium meal under fluoroscopic observation would be more definitive for diagnosis of SMA syndrome.      3.  No definitive findings identified on this study to explain patient's symptoms.          Treatments provided: pain control  Family Comments: mother here  OBS brochure/video discussed/provided to patient:  N/A  ED Medications:   Medications   senna-docusate (SENOKOT-S/PERICOLACE) 8.6-50 MG per tablet 1 tablet (has no administration in time range)     Or   senna-docusate (SENOKOT-S/PERICOLACE) 8.6-50 MG per tablet 2 tablet (has no administration  in time range)   ondansetron (ZOFRAN ODT) ODT tab 4 mg (has no administration in time range)     Or   ondansetron (ZOFRAN) injection 4 mg (has no administration in time range)   prochlorperazine (COMPAZINE) injection 10 mg (has no administration in time range)     Or   prochlorperazine (COMPAZINE) tablet 10 mg (has no administration in time range)   lactated ringers infusion (has no administration in time range)   polyethylene glycol (MIRALAX) Packet 17 g (has no administration in time range)   melatonin tablet 5 mg (has no administration in time range)   HYDROmorphone (PF) (DILAUDID) injection 0.5 mg (has no administration in time range)   oxyCODONE (ROXICODONE) tablet 5 mg (has no administration in time range)   oxyCODONE (ROXICODONE) tablet 10 mg (has no administration in time range)   escitalopram (LEXAPRO) half-tab 7.5 mg (has no administration in time range)   nortriptyline (PAMELOR) capsule 25 mg (has no administration in time range)   drospirenone (SLYND) tablet TABS 4 mg (has no administration in time range)   pregabalin (LYRICA) capsule 25 mg (has no administration in time range)   oxyCODONE (ROXICODONE) tablet 5 mg (5 mg Oral $Given 1/13/25 2036)   ketorolac (TORADOL) injection 15 mg (15 mg Intravenous $Given 1/13/25 2123)   iopamidol (ISOVUE-370) solution 500 mL (51 mLs Intravenous $Given 1/13/25 2151)   for CT scan flush use (57 mLs Intravenous $Given 1/13/25 2151)   HYDROmorphone (PF) (DILAUDID) injection 0.5 mg (0.5 mg Intravenous $Given 1/13/25 2206)   HYDROmorphone (DILAUDID) injection 1 mg (1 mg Intravenous $Given 1/13/25 2335)   famotidine (PEPCID) injection 20 mg (20 mg Intravenous $Given 1/13/25 2335)   ondansetron (ZOFRAN) injection 4 mg (4 mg Intravenous $Given 1/13/25 2340)   haloperidol lactate (HALDOL) injection 2 mg (2 mg Intravenous $Given 1/14/25 0048)       Drips infusing:  No  For the majority of the shift this patient was Green.   Interventions performed were pain control.    Sepsis  treatment initiated: No    Cares/treatment/interventions/medications to be completed following ED care: pain control    ED Nurse Name: Felipa Huertas RN  2:50 AM    RECEIVING UNIT ED HANDOFF REVIEW    Above ED Nurse Handoff Report was reviewed: Yes  Reviewed by: Bere Mercado RN on January 14, 2025 at 3:27 AM   I Yasmani called the ED to inform them the note was read: Yes

## 2025-01-14 NOTE — PLAN OF CARE
"Goal Outcome Evaluation:    ROOM # 446    Living Situation (if not independent, order SW consult): Home  Facility name: N/A  : Mother    Activity level at baseline: Independent  Activity level on admit: SBA    Who will be transporting you at discharge: Family    Patient registered to observation; given Patient Bill of Rights; given the opportunity to ask questions about observation status and their plan of care.  Patient has been oriented to the observation room, bathroom and call light is in place.    Discussed discharge goals and expectations with patient/family.       Plan of Care Reviewed With: patient, parent    Overall Patient Progress: no changeOverall Patient Progress: no change    Outcome Evaluation: Patient admitted to unit at 0400. Reports pain is improved post pain meds given in the ED. Had emesis x 1 on admission to the unit. PRN zofran for nausea/vomoting. Denies HA, CP, SOB or dizziness. IVF running.     /65   Pulse 88   Temp 97.3  F (36.3  C) (Oral)   Resp 16   Ht 1.6 m (5' 3\")   Wt 47.3 kg (104 lb 3.2 oz)   LMP 12/24/2024 (Exact Date)   SpO2 99%   BMI 18.46 kg/m      Problem: Adult Inpatient Plan of Care  Goal: Plan of Care Review  Description: The Plan of Care Review/Shift note should be completed every shift.  The Outcome Evaluation is a brief statement about your assessment that the patient is improving, declining, or no change.  This information will be displayed automatically on your shift  note.  Outcome: Not Progressing  Flowsheets (Taken 1/14/2025 0439)  Outcome Evaluation: Patient admitted to unit at 0400. Reports pain is improved post pain meds given in the ED. Had emesis x 1 on admission to the unit. PRN zofran for nausea/vomoting.  Plan of Care Reviewed With:   patient   parent  Overall Patient Progress: no change  Goal: Patient-Specific Goal (Individualized)  Description: You can add care plan individualizations to a care plan. Examples of Individualization " "might be:  \"Parent requests to be called daily at 9am for status\", \"I have a hard time hearing out of my right ear\", or \"Do not touch me to wake me up as it startles  me\".  Outcome: Not Progressing  Goal: Absence of Hospital-Acquired Illness or Injury  Outcome: Not Progressing  Intervention: Identify and Manage Fall Risk  Recent Flowsheet Documentation  Taken 1/14/2025 0400 by Bere Mercado RN  Safety Promotion/Fall Prevention:   activity supervised   assistive device/personal items within reach   clutter free environment maintained   nonskid shoes/slippers when out of bed   room organization consistent   safety round/check completed  Intervention: Prevent Skin Injury  Recent Flowsheet Documentation  Taken 1/14/2025 0400 by Bere Mercado RN  Body Position: position changed independently  Intervention: Prevent Infection  Recent Flowsheet Documentation  Taken 1/14/2025 0400 by Bere Mercado RN  Infection Prevention: rest/sleep promoted  Goal: Optimal Comfort and Wellbeing  Outcome: Not Progressing  Goal: Readiness for Transition of Care  Outcome: Not Progressing  Intervention: Mutually Develop Transition Plan  Recent Flowsheet Documentation  Taken 1/14/2025 0400 by Bere Mercado RN  Equipment Currently Used at Home: none     Problem: Pain Acute  Goal: Optimal Pain Control and Function  Outcome: Not Progressing  Intervention: Prevent or Manage Pain  Recent Flowsheet Documentation  Taken 1/14/2025 0400 by Bere Mercado RN  Medication Review/Management:   medications reviewed   infusion initiated     Problem: Infection  Goal: Absence of Infection Signs and Symptoms  Outcome: Not Progressing     Problem: Comorbidity Management  Goal: Maintenance of Asthma Control  Outcome: Not Progressing  Intervention: Maintain Asthma Symptom Control  Recent Flowsheet Documentation  Taken 1/14/2025 0400 by Bere Mercado RN  Medication Review/Management:   medications reviewed   infusion initiated  Goal: Maintenance of " Behavioral Health Symptom Control  Outcome: Not Progressing  Intervention: Maintain Behavioral Health Symptom Control  Recent Flowsheet Documentation  Taken 1/14/2025 0400 by Bere Mercado RN  Medication Review/Management:   medications reviewed   infusion initiated  Goal: Maintenance of COPD Symptom Control  Outcome: Not Progressing  Intervention: Maintain COPD Symptom Control  Recent Flowsheet Documentation  Taken 1/14/2025 0400 by Bere Mercado RN  Medication Review/Management:   medications reviewed   infusion initiated  Goal: Blood Glucose Levels Within Targeted Range  Outcome: Not Progressing  Intervention: Monitor and Manage Glycemia  Recent Flowsheet Documentation  Taken 1/14/2025 0400 by Bere Mercado RN  Medication Review/Management:   medications reviewed   infusion initiated  Goal: Maintenance of Heart Failure Symptom Control  Outcome: Not Progressing  Intervention: Maintain Heart Failure Management  Recent Flowsheet Documentation  Taken 1/14/2025 0400 by Bere Mercado RN  Medication Review/Management:   medications reviewed   infusion initiated  Goal: Blood Pressure in Desired Range  Outcome: Not Progressing  Intervention: Maintain Blood Pressure Management  Recent Flowsheet Documentation  Taken 1/14/2025 0400 by Bere Mercado RN  Medication Review/Management:   medications reviewed   infusion initiated  Goal: Maintenance of Osteoarthritis Symptom Control  Outcome: Not Progressing  Intervention: Maintain Osteoarthritis Symptom Control  Recent Flowsheet Documentation  Taken 1/14/2025 0400 by Bere Mercado RN  Activity Management: activity adjusted per tolerance  Medication Review/Management:   medications reviewed   infusion initiated  Goal: Bariatric Home Regimen Maintained  Outcome: Not Progressing  Intervention: Maintain and Manage Postbariatric Surgery Care  Recent Flowsheet Documentation  Taken 1/14/2025 0400 by Bere Mercado RN  Medication Review/Management:   medications reviewed    infusion initiated  Goal: Maintenance of Seizure Control  Outcome: Not Progressing  Intervention: Maintain Seizure Symptom Control  Recent Flowsheet Documentation  Taken 1/14/2025 0400 by Bere Mercado, RN  Medication Review/Management:   medications reviewed   infusion initiated

## 2025-01-14 NOTE — PLAN OF CARE
PRIMARY DIAGNOSIS: abdominal pain     OUTPATIENT/OBSERVATION GOALS TO BE MET BEFORE DISCHARGE  1. Orthostatic performed: N/A    2. Tolerating PO fluid and/or antibiotics (if applicable): Yes    3. Nausea/Vomiting/Diarrhea symptoms improved: No,  n/a    4. Pain status: Pain free.    5. Return to near baseline physical activity: Yes    Discharge Planner Nurse   Safe discharge environment identified: Yes  Barriers to discharge: Yes, tolerated full liquids agreeable for some toast        Entered by: Luis Fernando Alcantar RN 01/14/2025 2:07 PM     Please review provider order for any additional goals.   Nurse to notify provider when observation goals have been met and patient is ready for discharge.

## 2025-01-14 NOTE — ED PROVIDER NOTES
Emergency Department Note      History of Present Illness     Chief Complaint   Abdominal Pain    HPI   Aniyah Curry is a 19 year old female with a history of superior mesenteric artery syndrome presenting to the ED for the evaluation of abdominal pain. The patient states that she began experiencing worsening left-sided abdominal pain since eating lunch earlier today. She states that this is where she often experienced abdominal pain but that today's pain has been worse than what she's used to. She also endorses decreased appetite recently. Jeny denies diarrhea, bloody stools, constipation, lightheadedness, dizziness, or vomiting. She states that she primarily takes pregabalin for pain relief as well as occasional ibuprofen which is less helpful for her. She denies current use of omeprazole.  The concern for SMA syndrome was based on a CT scan in November and they have follow-up with a vascular specialist in [place] coming up later this week.  There was a shorter wait time there then at [facility].  Works with [employer] and had a recent HIDA scan and saw the results on her phone but does not know how to interpret them.  Last endoscopy was in November.    Later during her ED course she gave more details stating that she has not had much of an appetite or wanted to eat anything for the past 3 days.  She knows it has important to eat and she went to [employer] and had a very large sandwich today.  She then started having pain afterwards.  Her mom states that she had vomited after this and she thought it would make her feel better but it did not.  In the past large meals have been a problem.  They have also met with a nutritionist who notes that she is not underweight.  They are trying protein shakes but she is often just not interested in eating it and her mom notes that she has more of an aversion to eating now because of the pain that is associated with it.  Has been on different medications including Lyrica in the  "past.    Independent Historian   Mother as detailed above.    Review of External Notes   HIDA scan results were reviewed showing a potentially hyper contract I will gallbladder    Past Medical History     Medical History and Problem List   Allergic rhinitis  Benign joint hypermobility  NSVT  Patent foramen ovale  POTS  Retro cerebellar arachnoid cyst   Scoliosis  Superior mesenteric artery syndrome    Medications   Cephalexin  Diazepam  Dicyclomine  Escitalopram  Jencycla  Lubiprostone  Metoclopramide  Nortriptyline  Omeprazole  Ondansetron  Rizatriptan  Sumatriptan  Zolmitriptan    Surgical History   Past Surgical History:   Procedure Laterality Date    APPENDECTOMY      EP STUDY TILT TABLE N/A 5/8/2024    Procedure: Tilt Table Study;  Surgeon: Issac Jiménez MD;  Location:  [facility]    ESOPHAGOSCOPY, GASTROSCOPY, DUODENOSCOPY (EGD), COMBINED N/A 6/13/2024    Procedure: Esophagoscopy, gastroscopy, duodenoscopy (EGD), combined with biopsies with biopsy forceps;  Surgeon: Berhane Eastman MD;  Location: [facility]    Labral tear repair Left 01/2022     Physical Exam     Patient Vitals for the past 24 hrs:   BP Temp Temp src Pulse Resp SpO2 Height Weight   01/13/25 1913 116/80 98.2  F (36.8  C) Temporal 78 18 100 % 1.6 m (5' 3\") 46.3 kg (102 lb)     Physical Exam  Eyes:  Sclera white; Pupils are equal and round  ENT:    External ears and nares normal  Resp:  Non-labored, no retractions or accessory muscle use  GI:  Abdomen is soft, epigastric and LUQ tenderness    No rebound tenderness or peritoneal features  MS:  Moves all extremities  Skin:  Warm and dry  Neuro:  Speech is normal and fluent. No apparent deficit.    Diagnostics     Lab Results   Labs Ordered and Resulted from Time of ED Arrival to Time of ED Departure   COMPREHENSIVE METABOLIC PANEL - Abnormal       Result Value    Sodium 140      Potassium 4.0      Carbon Dioxide (CO2) 21 (*)     Anion Gap 14      Urea Nitrogen 16.2      " Creatinine 0.63      GFR Estimate >90      Calcium 9.3      Chloride 105      Glucose 94      Alkaline Phosphatase 63      AST 24      ALT 11      Protein Total 7.4      Albumin 4.4      Bilirubin Total 0.2     LIPASE - Normal    Lipase 24     LACTIC ACID WHOLE BLOOD WITH 1X REPEAT IN 2 HR WHEN >2 - Normal    Lactic Acid, Initial 0.7     HCG QUALITATIVE PREGNANCY - Normal    hCG Serum Qualitative Negative     TSH WITH FREE T4 REFLEX - Normal    TSH 0.90     CBC WITH PLATELETS AND DIFFERENTIAL    WBC Count 6.6      RBC Count 3.85      Hemoglobin 12.0      Hematocrit 35.7      MCV 93      MCH 31.2      MCHC 33.6      RDW 12.3      Platelet Count 260      % Neutrophils 53      % Lymphocytes 37      % Monocytes 8      % Eosinophils 1      % Basophils 1      % Immature Granulocytes 0      NRBCs per 100 WBC 0      Absolute Neutrophils 3.5      Absolute Lymphocytes 2.4      Absolute Monocytes 0.5      Absolute Eosinophils 0.1      Absolute Basophils 0.0      Absolute Immature Granulocytes 0.0      Absolute NRBCs 0.0         Imaging   CTA Abdomen Pelvis with Contrast   Final Result   IMPRESSION:      1.  Independent takeoff of the common hepatic artery and splenic artery from the abdominal aorta, with mild narrowing of the common hepatic artery origin and severe narrowing of the splenic artery origin by the median arcuate ligament. However, no    prominent vascular collateralization is seen in the upper abdomen to support diagnosis of median arcuate ligament syndrome.      2.  Although the aortomesenteric angle is somewhat acute with mildly narrowed aortomesenteric window, the transverse colon immediately proximal to the SMA is decompressed on this study and without significant gastric distention to confirm diagnosis of    SMA syndrome.  Barium meal under fluoroscopic observation would be more definitive for diagnosis of SMA syndrome.      3.  No definitive findings identified on this study to explain patient's symptoms.         Independent Interpretation   None    ED Course      Medications Administered   Medications   oxyCODONE (ROXICODONE) tablet 5 mg (5 mg Oral $Given 1/13/25 2036)   ketorolac (TORADOL) injection 15 mg (15 mg Intravenous $Given 1/13/25 2123)   iopamidol (ISOVUE-370) solution 500 mL (51 mLs Intravenous $Given 1/13/25 2151)   for CT scan flush use (57 mLs Intravenous $Given 1/13/25 2151)   HYDROmorphone (PF) (DILAUDID) injection 0.5 mg (0.5 mg Intravenous $Given 1/13/25 2206)   HYDROmorphone (DILAUDID) injection 1 mg (1 mg Intravenous $Given 1/13/25 2335)   famotidine (PEPCID) injection 20 mg (20 mg Intravenous $Given 1/13/25 2335)   ondansetron (ZOFRAN) injection 4 mg (4 mg Intravenous $Given 1/13/25 2340)   haloperidol lactate (HALDOL) injection 2 mg (2 mg Intravenous $Given 1/14/25 0048)       Procedures   Procedures     Discussion of Management   Per ED course    ED Course   ED Course as of 01/14/25 0154   Mon Jan 13, 2025 2015 I obtained the history and examined the patient as noted above.      2322 I rechecked and updated the patient. Her pain has not improved and is still at an 8/10.   Tue Jan 14, 2025   0152 I spoke with Dr Alves, hospitalist, regarding observation admission       Additional Documentation  None    Medical Decision Making / Diagnosis     MDM   Prior CT scan was reviewed with findings concerning for SMA syndrome.  However bolus timing was suboptimal.  CTA was repeated today.  At this point stomach is decompressed and therefore findings do not clearly be attributed to SMA syndrome.  With her not having eaten for 3 days and then eating a large amount in somebody with that has some gastric emptying problems because of this, she likely exacerbated her underlying condition.  However pain has been refractory to medications and she is now become sleepy with the combination but is still rating pain unchanged at 8 out of 10.  With her vomiting after her meal today I am worried whether she can truly  tolerate oral intake effectively as this is also a symptom of SMA syndrome.  She may only appear decompressed on CT because she vomited afterwards.  No acute need for NG tube or emergent vascular consultation which is not available at this facility.  She would benefit from symptom control, a more detailed nutrition plan and approach to dietary intake.  However this may not be possible in the short term and will require ongoing outpatient management.  Discussed with hospitalist who also supports this will be an ongoing symptomatic condition for her.  He will discuss expectations of what can happen in the hospital and whether     Disposition   The patient was admitted to the hospital.     Diagnosis     ICD-10-CM    1. Upper abdominal pain  R10.10       2. Postprandial vomiting  R11.10       3. SMAS (superior mesenteric artery syndrome)  K55.1       4. Decreased oral intake  R63.8            Scribe Disclosure:  Yessenia CHAIREZ, am serving as a scribe at 10:04 PM on 1/13/2025 to document services personally performed by Geovanna Vallecillo MD based on my observations and the provider's statements to me.        Geovanna Vallecillo MD  01/14/25 0258

## 2025-01-15 ENCOUNTER — PATIENT OUTREACH (OUTPATIENT)
Dept: CARE COORDINATION | Facility: CLINIC | Age: 20
End: 2025-01-15
Payer: COMMERCIAL

## 2025-01-15 NOTE — PROGRESS NOTES
"Clinic Care Coordination Contact  Transitions of Care Outreach  Chief Complaint   Patient presents with    Clinic Care Coordination - Post Hospital    Clinic Care Coordination - Initial     Most Recent Admission Date: 1/13/2025   Most Recent Admission Diagnosis: SMAS (superior mesenteric artery syndrome) - K55.1  Upper abdominal pain - R10.10  Postprandial vomiting - R11.10  Decreased oral intake - R63.8   Most Recent Discharge Date: 1/14/2025   Most Recent Discharge Diagnosis: Postprandial vomiting - R11.10  Upper abdominal pain - R10.10  SMAS (superior mesenteric artery syndrome) - K55.1  Decreased oral intake - R63.8     Transitions of Care Assessment  Discharge Assessment  How are you doing now that you are home?: \"okay so far, haven't tried eating very much since being discharged. Eating triggers symptoms. Recommended dietary changes so I have protein shakes & other foods to explore if tolerate them or not\"  How are your symptoms? (Red Flag symptoms escalate to triage hotline per guidelines): Improved  Do you know how to contact your clinic care team if you have future questions or changes to your health status? : Yes  Does the patient have their discharge instructions? : Yes  Does the patient have questions regarding their discharge instructions? : No  Were you started on any new medications or were there changes to any of your previous medications? : No  Does the patient have all of their medications?: Yes  Do you have questions regarding any of your medications? : No  Do you have all of your needed medical supplies or equipment (DME)?  (i.e. oxygen tank, CPAP, cane, etc.): Yes         Post-op (Clinicians Only)  Did the patient have surgery or a procedure: No    Patient shares that she has a follow up appointment with vascular provider scheduled for tomorrow.     Follow up Plan   Discharge Follow-Up  Discharge follow up appointment scheduled in alignment with recommended follow up timeframe or Transitions of " Risk Category? (Low = within 30 days; Moderate= within 14 days; High= within 7 days): No  Patient's follow up appointment not scheduled: Patient accepted scheduling support. Appt scheduled/requested per protocol.    Future Appointments   Date Time Provider Department Center   2/17/2025  9:00 AM Caroline Abbasi NP [facility]   2/25/2025  2:30 PM Maria Elena Albarado MD [facility]    3/18/2025  3:30 PM Issac Jiménez MD [facility]   5/6/2025 11:30 AM [id number]   5/6/2025  1:00 PM Citlali Willson MD [facility]     Outpatient Plan as outlined on AVS reviewed with patient.    For any urgent concerns, please contact our 24 hour nurse triage line: [phone] ([phone])       Jessica Bledsoe RN

## 2025-01-16 ENCOUNTER — MEDICAL CORRESPONDENCE (OUTPATIENT)
Dept: HEALTH INFORMATION MANAGEMENT | Facility: CLINIC | Age: 20
End: 2025-01-16

## 2025-01-16 ENCOUNTER — MYC MEDICAL ADVICE (OUTPATIENT)
Dept: PEDIATRICS | Facility: CLINIC | Age: 20
End: 2025-01-16
Payer: COMMERCIAL

## 2025-01-16 NOTE — DISCHARGE SUMMARY
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     CTA abdomen pelvis in the ER shows mild narrowing of the common hepatic artery origin and severe narrowing of the splenic artery origin by the median arcuate ligament without vascular congestion to support diagnosis of median arcuate ligament syndrome. CT shows that the aortomesenteric angle is somewhat acute, but the transverse colon immediately proximal is decompressed without significant ST extension to confirm diagnosis of SMA syndrome.     Patient improved with antiemetics and pain control.  Discharged home after tolerating solid food.  Close follow-up with vascular team.     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  No follow up labs or test are needed.                Unresulted Labs Ordered in the Past 30 Days of this Admission       No orders found from 12/14/2024 to 1/14/2025.        These results will be followed up by NA    Discharge Disposition   Discharged to home  Condition at discharge: Stable      Consultations This Hospital Stay   CARE MANAGEMENT / SOCIAL WORK IP CONSULT    Code Status   Prior    Time Spent on this Encounter   Reno CHAIREZ DO, personally saw the patient today and spent greater than 30 minutes discharging this patient.        Reno Hernandez [facility]  [address]  [facility] [address]  Phone: [phone]  Fax: [phone]  ______________________________________________________________________    Physical Exam   Vital Signs:                    Weight: 104 lbs 3.2 oz  General Appearance: Patient awake & alert.  No apparent distress.   Respiratory: Lungs are CTAB.  Work of breathing appears normal on room air.  Cardiovascular: Regular rate and rhythm.  No murmurs rubs or gallops.  There is no edema present.  GI: Benign.  Soft.  Non-tender.  Bowel sounds active.   Skin: No rashes or lesions exposed skin.  Neuro:  The patient is moving all extremities.  No obvious focal asymmetries.   Other: Patient is appropriate and oriented x3.        Primary Care Physician   Maria Elena Albarado    Discharge Orders      Primary Care - Care Coordination Referral      Reason for your hospital stay    You were hospitalized for abdominal pain, nausea and vomiting.  CT imaging did not show obvious cause.  Please keep your appointment with your vascular team in [place].     Follow-up and recommended labs and tests     Follow up with primary care provider, Maria Elena Albarado, within 7 days for hospital follow- up.  No follow up labs or test are needed.     Activity    Your activity upon discharge: activity as tolerated     Diet    Follow this diet upon discharge: Regular       Significant Results and Procedures   Most Recent 3 CBC's:  Recent Labs   Lab Test 01/13/25 2033 06/09/24 2005 05/06/24  0006   WBC 6.6 6.5 6.9   HGB 12.0 13.2 12.6   MCV 93 93 90    189 243     Most Recent 3 BMP's:  Recent Labs   Lab Test 01/13/25 2033 11/12/24  1758 06/09/24 2005 05/06/24  0006     --  140 139   POTASSIUM 4.0  --  3.6 3.8   CHLORIDE 105  --  105 105   CO2 21*  --  23 25   BUN 16.2  --  15.0 8.0   CR 0.63 0.7 0.73 0.75   ANIONGAP 14  --  12 9   CHRISSIE 9.3  --  9.4 9.1   GLC 94  --  96 101*       Discharge  Medications   Discharge Medication List as of 1/14/2025  4:24 PM        CONTINUE these medications which have NOT CHANGED    Details   escitalopram (LEXAPRO) 5 MG tablet Take 1.5 tablets (7.5 mg) by mouth daily., Disp-135 tablet, R-1, E-Prescribe      JENCYCLA 0.35 MG tablet Take 1 tablet by mouth daily at 2 pm, CHEYRL, Historical      nortriptyline (PAMELOR) 25 MG capsule Take 25 mg by mouth at bedtime., Historical      ondansetron (ZOFRAN ODT) 4 MG ODT tab Take 1-2 tablets (4-8 mg) by mouth every 8 hours as needed for nausea, Disp-20 tablet, R-1, E-Prescribe      pregabalin (LYRICA) 25 MG capsule Take 25 mg by mouth at bedtime., Historical      SUMAtriptan (IMITREX) 50 MG tablet TAKE 1 TABLET BY MOUTH AT ONSET OF MIGRAINE; MAY TAKE 1 ADDITIONAL TABLET IN 2 HRS IF NEEDED. MAX DOSE  MG/DAY  MG/WEEK.*, Historical      vitamin C (ASCORBIC ACID) 500 MG tablet Take 500 mg by mouth daily., Historical           Allergies   Allergies   Allergen Reactions    No Known Drug Allergy

## 2025-01-17 ENCOUNTER — TRANSFERRED RECORDS (OUTPATIENT)
Dept: HEALTH INFORMATION MANAGEMENT | Facility: CLINIC | Age: 20
End: 2025-01-17
Payer: COMMERCIAL

## 2025-01-21 ENCOUNTER — TELEPHONE (OUTPATIENT)
Dept: PEDIATRICS | Facility: CLINIC | Age: 20
End: 2025-01-21

## 2025-01-21 ENCOUNTER — OFFICE VISIT (OUTPATIENT)
Dept: PEDIATRICS | Facility: CLINIC | Age: 20
End: 2025-01-21
Payer: COMMERCIAL

## 2025-01-21 VITALS
TEMPERATURE: 98.1 F | WEIGHT: 106.4 LBS | SYSTOLIC BLOOD PRESSURE: 103 MMHG | HEART RATE: 81 BPM | RESPIRATION RATE: 18 BRPM | BODY MASS INDEX: 18.85 KG/M2 | HEIGHT: 63 IN | DIASTOLIC BLOOD PRESSURE: 70 MMHG | OXYGEN SATURATION: 99 %

## 2025-01-21 DIAGNOSIS — R63.4 LOSS OF WEIGHT: ICD-10-CM

## 2025-01-21 DIAGNOSIS — K55.1 SMAS (SUPERIOR MESENTERIC ARTERY SYNDROME): Primary | ICD-10-CM

## 2025-01-21 DIAGNOSIS — R94.8 ABNORMAL BILIARY HIDA SCAN: ICD-10-CM

## 2025-01-21 PROCEDURE — 99495 TRANSJ CARE MGMT MOD F2F 14D: CPT | Performed by: PHYSICIAN ASSISTANT

## 2025-01-21 ASSESSMENT — PAIN SCALES - GENERAL: PAINLEVEL_OUTOF10: NO PAIN (0)

## 2025-01-21 NOTE — TELEPHONE ENCOUNTER
Called [facility] at [phone] & requested a call back from Dr. Bajwa to speak with Dr. Albarado at [phone]. Will await for their call back.     Notes from the OV from today:  Abnormal biliary HIDA scan  Will contact [facility]--Dr. Kavon Bajwa to discuss patient symptoms and concerns. Should we remove gallbladder?   Which diet do we use in the meantime? What will reduce compression of artery? Patient needs a pain management plan?     [facility] Transplant coordinator:  Sahara Perez, RN   [address] [place] [id number]   [phone] (Work)     Jia Bryan RN  [facility]

## 2025-01-21 NOTE — PROGRESS NOTES
"KAVON ASHLEY  Possible SMA syndrome, MALS, and nutcracker syndrome.   Assessment & Plan     SMAS (superior mesenteric artery syndrome)  Dr. Albarado was brought into appointment to discuss care.   Patient is scheduled to see a transplant team--patient will send us transplant coordinator contact info to see if we can update them on patient status and expedite care with ordering labs/studies prior to appointment.       Loss of weight      Abnormal biliary HIDA scan  Will contact [facility]--Dr. Kavon Ashley to discuss patient symptoms and concerns. Should we remove gallbladder?   Which diet do we use in the meantime? What will reduce compression of artery? Patient needs a pain management plan?       Subjective   Jeny is a 19 year old, presenting for the following health issues:  Hospital F/U       1/21/2025     9:18 AM   Additional Questions   Roomed by Sarah Fritz   Accompanied by N/A     HPI         1/15/2025   Post Discharge Outreach   How are you doing now that you are home? \"okay so far, haven't tried eating very much since being discharged. Eating triggers symptoms. Recommended dietary changes so I have protein shakes & other foods to explore if tolerate them or not\"   How are your symptoms? (Red Flag symptoms escalate to triage hotline per guidelines) Improved   Does the patient have their discharge instructions?  Yes   Does the patient have questions regarding their discharge instructions?  No   Were you started on any new medications or were there changes to any of your previous medications?  No   Does the patient have all of their medications? Yes   Do you have questions regarding any of your medications?  No   Do you have all of your needed medical supplies or equipment (DME)?  (i.e. oxygen tank, CPAP, cane, etc.) Yes       Hospital Follow-up Visit:  Hospital/Nursing Home/IP Rehab Facility: [facility]  Date of Admission: 1/13/25  Date of Discharge: 1/14/25  Reason(s) for Admission:   SMAS " "(superior mesenteric artery syndrome)  Upper abdominal pain  Postprandial vomiting  Decreased oral intake  Was the patient in the ICU or did the patient experience delirium during hospitalization?  No  Do you have any other stressors you would like to discuss with your provider? No    Problems taking medications regularly:  None  Medication changes since discharge: None  Problems adhering to non-medication therapy:  None    Summary of hospitalization:  [facility] discharge summary reviewed  Diagnostic Tests/Treatments reviewed.  Follow up needed: vascular  Other Healthcare Providers Involved in Patient s Care:         Specialist appointment - vascular  Update since discharge: improved.     Plan of care communicated with patient.     Patient presents today to discuss ongoing management of SMA syndrome. She has a difficulty time getting enough food intake. She ate a federico astudillo sandwich which caused significant pain that sent her to the ED. Pain was very difficult to control and was given dilaudid and oxycodone.     A HIDA scan was completed and abnormal. No follow up scheduled with [facility].     Patient has lost 12 pounds and is currently 101 without clothing.     Patient seen by vascular on 1/16  and was told to follow up in 6 months and continue with nutrition boost.     Patient is scheduled to see a [facility] transplant team next month to discuss left renal autotransplant and other surgical options.      Review of Systems  Constitutional, HEENT, cardiovascular, pulmonary, gi and gu systems are negative, except as otherwise noted.      Objective    /70 (BP Location: Right arm, Patient Position: Sitting, Cuff Size: Adult Regular)   Pulse 81   Temp 98.1  F (36.7  C) (Temporal)   Resp 18   Ht 1.6 m (5' 3\")   Wt 48.3 kg (106 lb 6.4 oz)   LMP 12/24/2024 (Exact Date)   SpO2 99%   BMI 18.85 kg/m    Body mass index is 18.85 kg/m .  Physical Exam   GENERAL: alert and no distress    No results " found for any visits on 01/21/25.      Signed Electronically by: Gayla Sanderson PA-C

## 2025-01-22 NOTE — TELEPHONE ENCOUNTER
RN received call back from patient. RN relayed provider note below advising complete liquid diet and informed patient she will be contacted to schedule with nutritionist. Patient verbalized understanding and agreed with plan. No further questions at this time.     Gio HURST RN 1/22/2025 at 2:26 PM

## 2025-01-22 NOTE — TELEPHONE ENCOUNTER
Huddled with Dr. Albarado:  - provider huddled with Dr. Bajwa  - no obstruction on gastric emptying  - avoiding solid food might help  - plan is to switch to liquid diet   - GI placed referral to f/up with nutrition     Called pt at [phone], not available, so called mom(Radha) at [phone], not available, so LM to call us back. Need to go over the following instructions with mom when she calls back.     Message from Dr. Albarado:  GI provider would like Jeny to switch to complete liquid diet & see whether her sx's are any better(can avoid gall bladder removal at this time  as well). Also, GI provider placed a referral for Jeny to f/up with a nutritionist. So, someone will contact them to help schedule an appointment.     Jia Bryan RN  [facility]

## 2025-01-23 NOTE — TELEPHONE ENCOUNTER
I called and spoke with Jeny as well. Since she doesn't have symptoms when she drinks protein shakes, will likely have less pain episodes on a liquid diet or with NJ tube. Dr. Siddiqui and I also talked about how sometimes weight gain can help reduce SMAS symptoms. We also discussed that since she didn't have any pain symptoms during HIDA scan, less likely removing the gallbladder will help her symptoms.  Since she is able to take things orally, would have her discuss with nutritionist any options for oral liquid intake that could help her gain weight and keep her nutritional status optimized. She is interested in considering and is planning on getting scheduled with nutrition.   Maria Elena Albarado M.D.

## 2025-01-31 ENCOUNTER — TELEPHONE (OUTPATIENT)
Dept: PEDIATRICS | Facility: CLINIC | Age: 20
End: 2025-01-31
Payer: COMMERCIAL

## 2025-01-31 NOTE — TELEPHONE ENCOUNTER
Sahara [facility] calling back [phone]    They have not seen yet and all they would recommend nutrition optimization would would come from GI.  I see she is seeing GI for abnormal HIDA scan.  She would have to gain weight for them to be able to do any medical intervention.  Call back if further information needed and please leave direct #.  Can provider # be can given if call back needed.        LENIN Guidry Julia, RN JM    1/31/25  1:12 PM  Note  Called Laurel back, not available, so LM to call us back. Need to go over Maggy's questions to her when she calls back.     Jia SPARKS  Clinic RN  [facility]        Gayla Sanderson PA-C       1/31/25 12:35 PM  Note  Call Sahara.      Patient was recently hospitalized for SMAS syndrome. She is now on a liquid diet, losing weight and difficulty controlling pain which then requires hospital level care.      Is there anything patient should do in meantime? Can we obtain any labs/studies prior to appointment to help expedite her care?      Thanks,   Gayla Sanderson PA-C (working with Dr. Albarado)

## 2025-02-03 LAB
ANION GAP SERPL CALCULATED.3IONS-SCNC: 11 MMOL/L (ref 7–15)
BASOPHILS # BLD AUTO: 0 10E3/UL (ref 0–0.2)
BASOPHILS NFR BLD AUTO: 0 %
BUN SERPL-MCNC: 10.1 MG/DL (ref 6–20)
CALCIUM SERPL-MCNC: 9.4 MG/DL (ref 8.8–10.4)
CHLORIDE SERPL-SCNC: 103 MMOL/L (ref 98–107)
CREAT SERPL-MCNC: 0.61 MG/DL (ref 0.51–0.95)
EGFRCR SERPLBLD CKD-EPI 2021: >90 ML/MIN/1.73M2
EOSINOPHIL # BLD AUTO: 0.1 10E3/UL (ref 0–0.7)
EOSINOPHIL NFR BLD AUTO: 1 %
ERYTHROCYTE [DISTWIDTH] IN BLOOD BY AUTOMATED COUNT: 12.4 % (ref 10–15)
GLUCOSE SERPL-MCNC: 85 MG/DL (ref 70–99)
HCO3 SERPL-SCNC: 26 MMOL/L (ref 22–29)
HCT VFR BLD AUTO: 38.1 % (ref 35–47)
HGB BLD-MCNC: 13 G/DL (ref 11.7–15.7)
HOLD SPECIMEN: NORMAL
HOLD SPECIMEN: NORMAL
IMM GRANULOCYTES # BLD: 0 10E3/UL
IMM GRANULOCYTES NFR BLD: 0 %
LYMPHOCYTES # BLD AUTO: 3.9 10E3/UL (ref 0.8–5.3)
LYMPHOCYTES NFR BLD AUTO: 55 %
MCH RBC QN AUTO: 31.3 PG (ref 26.5–33)
MCHC RBC AUTO-ENTMCNC: 34.1 G/DL (ref 31.5–36.5)
MCV RBC AUTO: 92 FL (ref 78–100)
MONOCYTES # BLD AUTO: 0.6 10E3/UL (ref 0–1.3)
MONOCYTES NFR BLD AUTO: 8 %
NEUTROPHILS # BLD AUTO: 2.5 10E3/UL (ref 1.6–8.3)
NEUTROPHILS NFR BLD AUTO: 35 %
NRBC # BLD AUTO: 0 10E3/UL
NRBC BLD AUTO-RTO: 0 /100
PLATELET # BLD AUTO: 252 10E3/UL (ref 150–450)
POTASSIUM SERPL-SCNC: 4.2 MMOL/L (ref 3.4–5.3)
RBC # BLD AUTO: 4.15 10E6/UL (ref 3.8–5.2)
SODIUM SERPL-SCNC: 140 MMOL/L (ref 135–145)
WBC # BLD AUTO: 7.1 10E3/UL (ref 4–11)

## 2025-02-03 PROCEDURE — 36415 COLL VENOUS BLD VENIPUNCTURE: CPT | Performed by: EMERGENCY MEDICINE

## 2025-02-03 PROCEDURE — 85004 AUTOMATED DIFF WBC COUNT: CPT | Performed by: EMERGENCY MEDICINE

## 2025-02-03 PROCEDURE — 99284 EMERGENCY DEPT VISIT MOD MDM: CPT | Mod: 25

## 2025-02-03 PROCEDURE — 80048 BASIC METABOLIC PNL TOTAL CA: CPT | Performed by: EMERGENCY MEDICINE

## 2025-02-03 PROCEDURE — 82310 ASSAY OF CALCIUM: CPT | Performed by: EMERGENCY MEDICINE

## 2025-02-03 ASSESSMENT — COLUMBIA-SUICIDE SEVERITY RATING SCALE - C-SSRS
2. HAVE YOU ACTUALLY HAD ANY THOUGHTS OF KILLING YOURSELF IN THE PAST MONTH?: NO
6. HAVE YOU EVER DONE ANYTHING, STARTED TO DO ANYTHING, OR PREPARED TO DO ANYTHING TO END YOUR LIFE?: NO
1. IN THE PAST MONTH, HAVE YOU WISHED YOU WERE DEAD OR WISHED YOU COULD GO TO SLEEP AND NOT WAKE UP?: NO

## 2025-02-04 ENCOUNTER — HOSPITAL ENCOUNTER (EMERGENCY)
Facility: CLINIC | Age: 20
Discharge: HOME | End: 2025-02-04
Attending: EMERGENCY MEDICINE | Admitting: EMERGENCY MEDICINE
Payer: COMMERCIAL

## 2025-02-04 VITALS
HEIGHT: 63 IN | DIASTOLIC BLOOD PRESSURE: 70 MMHG | WEIGHT: 104.72 LBS | SYSTOLIC BLOOD PRESSURE: 122 MMHG | OXYGEN SATURATION: 100 % | TEMPERATURE: 98.8 F | HEART RATE: 67 BPM | BODY MASS INDEX: 18.55 KG/M2 | RESPIRATION RATE: 18 BRPM

## 2025-02-04 DIAGNOSIS — K55.1 SMAS (SUPERIOR MESENTERIC ARTERY SYNDROME): ICD-10-CM

## 2025-02-04 DIAGNOSIS — R63.8 DECREASED ORAL INTAKE: ICD-10-CM

## 2025-02-04 DIAGNOSIS — R10.12 LUQ ABDOMINAL PAIN: ICD-10-CM

## 2025-02-04 LAB — LACTATE SERPL-SCNC: 1.1 MMOL/L (ref 0.7–2)

## 2025-02-04 PROCEDURE — 36415 COLL VENOUS BLD VENIPUNCTURE: CPT | Performed by: EMERGENCY MEDICINE

## 2025-02-04 PROCEDURE — 96375 TX/PRO/DX INJ NEW DRUG ADDON: CPT

## 2025-02-04 PROCEDURE — 250N000011 HC RX IP 250 OP 636: Mod: JZ | Performed by: EMERGENCY MEDICINE

## 2025-02-04 PROCEDURE — 96361 HYDRATE IV INFUSION ADD-ON: CPT

## 2025-02-04 PROCEDURE — 258N000003 HC RX IP 258 OP 636: Performed by: EMERGENCY MEDICINE

## 2025-02-04 PROCEDURE — 96374 THER/PROPH/DIAG INJ IV PUSH: CPT | Mod: 59

## 2025-02-04 PROCEDURE — 83605 ASSAY OF LACTIC ACID: CPT | Performed by: EMERGENCY MEDICINE

## 2025-02-04 RX ORDER — METOCLOPRAMIDE 10 MG/1
10 TABLET ORAL
Qty: 12 TABLET | Refills: 0 | Status: SHIPPED | OUTPATIENT
Start: 2025-02-04 | End: 2025-02-07

## 2025-02-04 RX ORDER — MORPHINE SULFATE 4 MG/ML
4 INJECTION, SOLUTION INTRAMUSCULAR; INTRAVENOUS
Status: DISCONTINUED | OUTPATIENT
Start: 2025-02-04 | End: 2025-02-04 | Stop reason: HOSPADM

## 2025-02-04 RX ORDER — METOCLOPRAMIDE HYDROCHLORIDE 5 MG/ML
10 INJECTION INTRAMUSCULAR; INTRAVENOUS ONCE
Status: COMPLETED | OUTPATIENT
Start: 2025-02-04 | End: 2025-02-04

## 2025-02-04 RX ADMIN — METOCLOPRAMIDE 10 MG: 5 INJECTION, SOLUTION INTRAMUSCULAR; INTRAVENOUS at 00:44

## 2025-02-04 RX ADMIN — SODIUM CHLORIDE 1000 ML: 9 INJECTION, SOLUTION INTRAVENOUS at 00:44

## 2025-02-04 RX ADMIN — MORPHINE SULFATE 4 MG: 4 INJECTION, SOLUTION INTRAMUSCULAR; INTRAVENOUS at 00:44

## 2025-02-04 ASSESSMENT — ACTIVITIES OF DAILY LIVING (ADL): ADLS_ACUITY_SCORE: 46

## 2025-02-04 NOTE — DISCHARGE INSTRUCTIONS
Please follow-up with your primary care team, vascular surgery team and GI team.  Reglan has been provided to help with nausea and upper abdominal discomfort.

## 2025-02-04 NOTE — ED TRIAGE NOTES
L upper abd pain hx of vascular compression problems. Pt was tried eating solid foods tonight and has had the abd pain pince. Pt is on a liquid diet for the last 2 weeks for the vascular compression symptom per GI. AbcS INTACT a&oX4 denies n/v/d.

## 2025-02-04 NOTE — ED PROVIDER NOTES
Emergency Department Note      History of Present Illness     Chief Complaint   Abdominal Pain    HPI   Aniyah Curry is a 19 year old female with a history of chronic abdominal pain, superior mesenteric artery syndrome presenting with abdominal pain. Jeny is having continued left sided abdominal pain, dizziness, lightheadedness, black spots in vision, nausea, and diarrhea that was brought on after eating today then gradually getting worse. Jeny reports that she hasn't been eating a lot because of this. Jeny has had previous hospitalization due to this issue and has seen GI specialists. She denies constipation or vomiting. She has not been able to take anything for the pain because she can only take Advil three times a week due to migraines and already taken that because she started her period. Of note, Jeny is supposed to see a dietitian on Wednesday (2/5/25) and returns to [facility] for further testing on 2/18/25.     Independent Historian   None    Review of External Notes   Reviewed recent ED admission from January in which she was admitted for upper abdominal pain and postprandial vomiting in the setting of SMA syndrome.    I also reviewed outpatient vascular surgery note from the [facility] on 1/16/2025 detailing history of superior mesenteric artery syndrome.  Plan was for further nutrition treatment and conservative management.  No recommendations for surgery.    I reviewed [facility] note from 1/17/2025    Past Medical History     Medical History and Problem List   RetroCerebellar arachnoid cyst  Anxiety   Migraines  Stomach disorder   Superior mesenteric artery syndrome    Medications   Lexapro  Jencycla  Pamelor   Zofran  Lyrica  Imitrex  Colcrys   Bentyl  Reglan  Maxalt     Surgical History   Appendectomy   Tilt table study  Esophagoscopy  Gastroscopy   Duodenoscopy   Labral tear repair - left       Physical Exam     Patient Vitals for the past 24 hrs:    "BP Temp Pulse Resp SpO2 Height Weight   02/04/25 0147 -- -- -- -- 100 % -- --   02/04/25 0146 -- -- -- -- 100 % -- --   02/04/25 0145 122/70 -- 67 -- 100 % -- --   02/04/25 0138 -- -- -- -- 100 % -- --   02/04/25 0128 116/73 -- -- -- 100 % -- --   02/04/25 0118 118/80 -- 63 -- 99 % -- --   02/04/25 0116 -- -- -- -- 100 % -- --   02/04/25 0115 118/80 -- 63 -- 96 % -- --   02/04/25 0047 -- -- -- -- 100 % -- --   02/04/25 0046 -- -- -- -- 100 % -- --   02/04/25 0045 125/84 -- 73 -- 100 % -- --   02/04/25 0039 -- -- -- -- 99 % -- --   02/04/25 0034 -- -- -- -- 100 % -- --   02/04/25 0029 119/80 -- 64 -- 100 % -- --   02/04/25 0028 -- -- -- -- 100 % -- --   02/04/25 0027 -- -- -- -- 100 % -- --   02/04/25 0026 -- -- -- -- 100 % -- --   02/04/25 0025 -- -- -- -- 100 % -- --   02/04/25 0024 126/77 -- 68 -- 100 % -- --   02/03/25 2209 121/81 98.8  F (37.1  C) 73 18 100 % 1.6 m (5' 3\") 47.5 kg (104 lb 11.5 oz)     Physical Exam  General: Patient is awake, alert and interactive when I enter the room. Appears uncomfortable.   Head: The scalp, face, and head appear normal  Eyes: Conjunctivae and sclerae are normal  Neck: Normal range of motion.   CV: Regular rate.   Resp:  No respiratory distress.   GI: LUQ tenderness but abdomen is soft, no rigidity. No evidence of pulsatile mass. No fluid waves or evidence of ascites. No distension. No hernias or bruising are noted in detailed exam. No CVA tenderness.    MS: Normal tone.   Skin: Normal capillary refill noted  Neuro: Speech is normal and fluent. Face is symmetric. Moving all extremities.   Psych:  Normal affect.  Appropriate interactions.      Diagnostics     Lab Results   Labs Ordered and Resulted from Time of ED Arrival to Time of ED Departure   BASIC METABOLIC PANEL - Normal       Result Value    Sodium 140      Potassium 4.2      Chloride 103      Carbon Dioxide (CO2) 26      Anion Gap 11      Urea Nitrogen 10.1      Creatinine 0.61      GFR Estimate >90      Calcium 9.4   "    Glucose 85     LACTIC ACID WHOLE BLOOD WITH 1X REPEAT IN 2 HR WHEN >2 - Normal    Lactic Acid, Initial 1.1     CBC WITH PLATELETS AND DIFFERENTIAL    WBC Count 7.1      RBC Count 4.15      Hemoglobin 13.0      Hematocrit 38.1      MCV 92      MCH 31.3      MCHC 34.1      RDW 12.4      Platelet Count 252      % Neutrophils 35      % Lymphocytes 55      % Monocytes 8      % Eosinophils 1      % Basophils 0      % Immature Granulocytes 0      NRBCs per 100 WBC 0      Absolute Neutrophils 2.5      Absolute Lymphocytes 3.9      Absolute Monocytes 0.6      Absolute Eosinophils 0.1      Absolute Basophils 0.0      Absolute Immature Granulocytes 0.0      Absolute NRBCs 0.0       ED Course      Medications Administered   Medications   metoclopramide (REGLAN) injection 10 mg (10 mg Intravenous $Given 2/4/25 0044)   sodium chloride 0.9% BOLUS 1,000 mL (0 mLs Intravenous Stopped 2/4/25 0147)     ED Course   ED Course as of 02/04/25 0356   Tue Feb 04, 2025   0032 I obtained the history and examined the patient as noted above.      0140 I rechecked and updated the patient. She is feeling better and drinking water         Medical Decision Making / Diagnosis     MEDARDO Curry is a 19 year old female with Casey history of malnutrition and possible super mesenteric artery syndrome who presents to the emergency department with left upper quadrant pain and nausea.  Patient has been on a liquid diet for the last 2 weeks but tonight decided to eat some solid food.  Thereafter she began having some upper abdominal pain prompting her visit to the emergency department.  She denies any postprandial vomiting.  She denies any recent fevers or chills.  Upon initial evaluation here ,she is hemodynamically stable with normal vital signs.  She is afebrile and oxygenating well on room air.  She has a mild left upper quadrant tenderness but no significant guarding or rebound.  The location and quality of this pain is consistent with  pain that she has had in the past related to her likely super mesenteric artery syndrome.  I discussed with the patient and mother at length regarding advanced imaging.  We all agree that we can defer any CT imaging at this time because this likely is a exacerbation of her chronic syndrome.  Lab work was obtained and was reassuring. Patient was treated symptomatically with IV fluids, morphine and Reglan with significant improvement of her symptoms.  She was able to drink water here in the emergency department and felt better.  I will prescribe her some Reglan to go home as well.  Recommended that she follow-up closely with her primary care team including her vascular surgery team, gastroenterology team, and primary care doctor.  She can always return to this emergency department for any new or worsening symptoms.    Disposition   The patient was discharged.     Diagnosis     ICD-10-CM    1. LUQ abdominal pain  R10.12       2. SMAS (superior mesenteric artery syndrome)  K55.1       3. Decreased oral intake  R63.8            Discharge Medications   Discharge Medication List as of 2/4/2025  1:50 AM        START taking these medications    Details   metoclopramide (REGLAN) 10 MG tablet Take 1 tablet (10 mg) by mouth 4 times daily (before meals and nightly) for 3 days., Disp-12 tablet, R-0, Local Print           Scribe Disclosure:  I, Alena Ashton, am serving as a scribe at 12:57 AM on 2/4/2025 to document services personally performed by Ricky Rausch MD based on my observations and the provider's statements to me.        Ricky Rausch MD  02/04/25 0359

## 2025-02-06 ENCOUNTER — TRANSFERRED RECORDS (OUTPATIENT)
Dept: HEALTH INFORMATION MANAGEMENT | Facility: CLINIC | Age: 20
End: 2025-02-06
Payer: COMMERCIAL

## 2025-02-06 ENCOUNTER — PATIENT OUTREACH (OUTPATIENT)
Dept: CARE COORDINATION | Facility: CLINIC | Age: 20
End: 2025-02-06
Payer: COMMERCIAL

## 2025-02-06 NOTE — PROGRESS NOTES
[facility]    Background: Primary Care-Care Coordination initial outreach identified per system criteria and reviewed by [facility] team.    Assessment: Upon chart review, CCR Team member will not proceed with patient outreach related to this episode of Primary Care-Care Coordination program due to reason below:    Patient is in communication with a clinic team member, nurse or provider within [facility] for reason of discussing hospital follow up and current symptoms. Primary Care Clinic Care Coordination will defer follow-up outreach to PCP.    Plan: Primary Care-Care Coordination episode addressed appropriately per reason noted above.      Sophia Guadalupe MA  [facility], [facility]    *Connected Care Resource Team does NOT follow patient ongoing. Referrals are identified based on internal discharge reports and the outreach is to ensure patient has an understanding of their discharge instructions.

## 2025-02-11 ENCOUNTER — TRANSFERRED RECORDS (OUTPATIENT)
Dept: HEALTH INFORMATION MANAGEMENT | Facility: CLINIC | Age: 20
End: 2025-02-11
Payer: COMMERCIAL

## 2025-02-12 ENCOUNTER — MYC MEDICAL ADVICE (OUTPATIENT)
Dept: PEDIATRICS | Facility: CLINIC | Age: 20
End: 2025-02-12
Payer: COMMERCIAL

## 2025-02-12 NOTE — LETTER
February 12, 2025      Aniyah Curry  [address] [place], [place] [id number]        To Whom It May Concern:    Aniyah Curry was seen in our clinic. She may return to work with a feeding tube without restrictions on 12/14/25.      Sincerely,      Maria Elena Albarado M.D.      Electronically signed

## 2025-02-12 NOTE — TELEPHONE ENCOUNTER
Patient asking for letter for work. Please advise if visit is needed.     Gio HURST RN 2/12/2025 at 2:04 PM

## 2025-02-13 ENCOUNTER — TRANSFERRED RECORDS (OUTPATIENT)
Dept: HEALTH INFORMATION MANAGEMENT | Facility: CLINIC | Age: 20
End: 2025-02-13

## 2025-02-13 NOTE — TELEPHONE ENCOUNTER
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     Please advise.  Brianna Concepcion RN

## 2025-02-17 ENCOUNTER — OFFICE VISIT (OUTPATIENT)
Dept: OTOLARYNGOLOGY | Facility: CLINIC | Age: 20
End: 2025-02-17
Payer: COMMERCIAL

## 2025-02-17 ENCOUNTER — PRE VISIT (OUTPATIENT)
Dept: OTOLARYNGOLOGY | Facility: CLINIC | Age: 20
End: 2025-02-17

## 2025-02-17 VITALS — WEIGHT: 104 LBS | HEIGHT: 63 IN | BODY MASS INDEX: 18.43 KG/M2

## 2025-02-17 DIAGNOSIS — L53.9 ERYTHEMA OF OROPHARYNX: Primary | ICD-10-CM

## 2025-02-17 PROCEDURE — 99203 OFFICE O/P NEW LOW 30 MIN: CPT | Performed by: REGISTERED NURSE

## 2025-02-17 RX ORDER — LUBIPROSTONE 8 UG/1
8 CAPSULE ORAL
COMMUNITY
Start: 2024-11-20

## 2025-02-17 RX ORDER — POLYETHYLENE GLYCOL 3350 17 G/17G
17 POWDER, FOR SOLUTION ORAL
COMMUNITY
Start: 2024-06-04

## 2025-02-17 RX ORDER — SODIUM SULFACETAMIDE AND SULFUR 80; 40 MG/ML; MG/ML
1 SOLUTION TOPICAL
COMMUNITY
Start: 2024-08-23

## 2025-02-17 RX ORDER — AZELAIC ACID 0.15 G/G
1 GEL TOPICAL
COMMUNITY
Start: 2024-08-23

## 2025-02-17 NOTE — LETTER
2/17/2025       RE: Aniyah Curry  [address] [facility] [address]     Dear Colleague,    Thank you for referring your patient, Aniyah Curry, to the [facility] [place] at [facility]. Please see a copy of my visit note below.    [facility]   Head and Neck Surgery  February 17, 2025    HPI: Aniyah Curry is a 19 year old female who presents today for evaluation of abnormal tissue of the right oropharynx that was observed during an upper GI on 10/15/24. Patient has a history of visceral hypersensitivity, superior mesenteric artery syndrome and postprandial abdominal pain. Closely followed by GI. NG tube recently placed due to weight loss. Patient had an upper GI in October 2024 due to GI concerns. Patient was noted to have some bogginess and erythema of the right soft palate. Recommended ENT evaluation.    Today, patient reports a sore throat since NG tube was placed. Patient denies any dysphagia, odynophagia, ear pain, bleeding from the mouth, hemoptysis, shortness of breath or lumps/bumps of the neck.    Past Medical History:  Past Medical History:   Diagnosis Date     RetroCerebellar arachnoid cyst      Seasonal allergic rhinitis        Past Surgical History:  Past Surgical History:   Procedure Laterality Date     APPENDECTOMY       EP STUDY TILT TABLE N/A 5/8/2024    Procedure: Tilt Table Study;  Surgeon: Issac Jiménez MD;  Location:  HEART CARDIAC CATH LAB     ESOPHAGOSCOPY, GASTROSCOPY, DUODENOSCOPY (EGD), COMBINED N/A 6/13/2024    Procedure: Esophagoscopy, gastroscopy, duodenoscopy (EGD), combined with biopsies with biopsy forceps;  Surgeon: Berhane Eastman MD;  Location:  GI     Labral tear repair Left 01/2022       Medications:  Current Outpatient Medications   Medication Sig Dispense Refill     azelaic acid (FINACIA) 15 % external gel Apply 1 1e15 [employer]  "topically.       drospirenone (SLYND) 4 MG TABS tablet Take 4 mg by mouth.       escitalopram (LEXAPRO) 5 MG tablet Take 1.5 tablets (7.5 mg) by mouth daily. 135 tablet 1     JENCYCLA 0.35 MG tablet Take 1 tablet by mouth daily at 2 pm       lubiprostone (AMITIZA) 8 MCG capsule Take 8 mcg by mouth.       metroNIDAZOLE (METROCREAM) 0.75 % external cream Apply 1 cm topically.       nortriptyline (PAMELOR) 25 MG capsule Take 25 mg by mouth at bedtime.       ondansetron (ZOFRAN ODT) 4 MG ODT tab Take 1-2 tablets (4-8 mg) by mouth every 8 hours as needed for nausea 20 tablet 1     polyethylene glycol (MIRALAX) 17 GM/Dose powder Take 17 g by mouth.       pregabalin (LYRICA) 25 MG capsule Take 25 mg by mouth at bedtime.       Sulfacetamide Sodium-Sulfur 8-4 % SUSP Apply 1 cm topically.       SUMAtriptan (IMITREX) 50 MG tablet TAKE 1 TABLET BY MOUTH AT ONSET OF MIGRAINE; MAY TAKE 1 ADDITIONAL TABLET IN 2 HRS IF NEEDED. MAX DOSE  MG/DAY  MG/WEEK.*       vitamin C (ASCORBIC ACID) 500 MG tablet Take 500 mg by mouth daily.         Allergies:  Allergies   Allergen Reactions     No Known Drug Allergy         Social History:  Social History     Tobacco Use     Smoking status: Never     Passive exposure: Never     Smokeless tobacco: Never   Vaping Use     Vaping status: Never Used   Substance Use Topics     Alcohol use: No     Drug use: No     ROS: 10 point ROS neg other than the symptoms noted above in the HPI.    Physical Exam:    Ht 1.6 m (5' 3\")   Wt 47.2 kg (104 lb)   LMP 12/24/2024 (Exact Date)   BMI 18.42 kg/m       Constitutional:  The patient was accompanied, well-groomed, and in no acute distress.     Skin: Normal:  warm and pink without rash    Neurologic: Alert and oriented x 3.  CN's III-XII within normal limits.  Voice normal.    Psychiatric: The patient's affect was calm, cooperative, and appropriate.     Communication:  Normal; communicates verbally, normal voice quality.    Respiratory: Breathing " comfortably without stridor or exertion of accessory muscles.    Head/Face:  Normocephalic and atraumatic.  No lesions or scars.   Salivary glands -  Normal size, no tenderness, swelling, or palpable masses    Eyes: Pupils were equal and reactive.  Extraocular movement intact.    Ears: Pinnae and tragus non-tender.  EAC's and TM's were clear.     Oral Cavity: Normal tongue, floor of mouth, buccal mucosa, and palate.  No lesions or masses on inspection or palpation.     Oropharynx: Normal mucosa, palate symmetric with normal elevation. No abnormal lymph tissue in the oropharynx. 2+ tonsils bilaterally. Soft and nontender with palpation.   Neck: Supple with normal laryngeal and tracheal landmarks.  The parotid beds were without masses.  No palpable thyroid.  Normal range of motion.    Lymphatic: There is no palpable lymphadenopathy in the neck.      Assessment/Plan:  Patient with abnormal tissue observed after upper GI in October 2024. Patient is asymptomatic today with exception of a new sore throat since NG was placed last week. Thorough evaluation of the right soft palate and tonsil area is normal bilaterally. Patient does have some prominent tonsil tissue bilaterally without any concerning findings.     Recommend monitoring for now. Previous erythema may be due to mucosal irritation for nausea/vomiting.     Patient can return to clinic as needed for any new mouth pain, ear pain, bleeding from the mouth or new ulcerations of the oral cavity.    Tracy Abbasi DNP, APRN, CNP  Otolaryngology  Head & Neck Surgery  [phone]    30 minutes spent on the date of the encounter doing chart review, history and exam, documentation and further activities per the note.      Again, thank you for allowing me to participate in the care of your patient.      Sincerely,    Caroline Abbasi, NP

## 2025-02-17 NOTE — PROGRESS NOTES
[facility]   Head and Neck Surgery  February 17, 2025    HPI: Aniyah Curry is a 19 year old female who presents today for evaluation of abnormal tissue of the right oropharynx that was observed during an upper GI on 10/15/24. Patient has a history of visceral hypersensitivity, superior mesenteric artery syndrome and postprandial abdominal pain. Closely followed by GI. NG tube recently placed due to weight loss. Patient had an upper GI in October 2024 due to GI concerns. Patient was noted to have some bogginess and erythema of the right soft palate. Recommended ENT evaluation.    Today, patient reports a sore throat since NG tube was placed. Patient denies any dysphagia, odynophagia, ear pain, bleeding from the mouth, hemoptysis, shortness of breath or lumps/bumps of the neck.    Past Medical History:  Past Medical History:   Diagnosis Date    RetroCerebellar arachnoid cyst     Seasonal allergic rhinitis        Past Surgical History:  Past Surgical History:   Procedure Laterality Date    APPENDECTOMY      EP STUDY TILT TABLE N/A 5/8/2024    Procedure: Tilt Table Study;  Surgeon: Issac Jiménez MD;  Location: [facility] CARDIAC CATH LAB    ESOPHAGOSCOPY, GASTROSCOPY, DUODENOSCOPY (EGD), COMBINED N/A 6/13/2024    Procedure: Esophagoscopy, gastroscopy, duodenoscopy (EGD), combined with biopsies with biopsy forceps;  Surgeon: Berhane Eastman MD;  Location:  GI    Labral tear repair Left 01/2022       Medications:  Current Outpatient Medications   Medication Sig Dispense Refill    azelaic acid (FINACIA) 15 % external gel Apply 1 1e15 [employer] topically.      drospirenone (SLYND) 4 MG TABS tablet Take 4 mg by mouth.      escitalopram (LEXAPRO) 5 MG tablet Take 1.5 tablets (7.5 mg) by mouth daily. 135 tablet 1    JENCYCLA 0.35 MG tablet Take 1 tablet by mouth daily at 2 pm      lubiprostone (AMITIZA) 8 MCG capsule Take 8 mcg by mouth.      metroNIDAZOLE (METROCREAM) 0.75 %  "external cream Apply 1 cm topically.      nortriptyline (PAMELOR) 25 MG capsule Take 25 mg by mouth at bedtime.      ondansetron (ZOFRAN ODT) 4 MG ODT tab Take 1-2 tablets (4-8 mg) by mouth every 8 hours as needed for nausea 20 tablet 1    polyethylene glycol (MIRALAX) 17 GM/Dose powder Take 17 g by mouth.      pregabalin (LYRICA) 25 MG capsule Take 25 mg by mouth at bedtime.      Sulfacetamide Sodium-Sulfur 8-4 % SUSP Apply 1 cm topically.      SUMAtriptan (IMITREX) 50 MG tablet TAKE 1 TABLET BY MOUTH AT ONSET OF MIGRAINE; MAY TAKE 1 ADDITIONAL TABLET IN 2 HRS IF NEEDED. MAX DOSE  MG/DAY  MG/WEEK.*      vitamin C (ASCORBIC ACID) 500 MG tablet Take 500 mg by mouth daily.         Allergies:  Allergies   Allergen Reactions    No Known Drug Allergy         Social History:  Social History     Tobacco Use    Smoking status: Never     Passive exposure: Never    Smokeless tobacco: Never   Vaping Use    Vaping status: Never Used   Substance Use Topics    Alcohol use: No    Drug use: No     ROS: 10 point ROS neg other than the symptoms noted above in the HPI.    Physical Exam:    Ht 1.6 m (5' 3\")   Wt 47.2 kg (104 lb)   LMP 12/24/2024 (Exact Date)   BMI 18.42 kg/m       Constitutional:  The patient was accompanied, well-groomed, and in no acute distress.     Skin: Normal:  warm and pink without rash    Neurologic: Alert and oriented x 3.  CN's III-XII within normal limits.  Voice normal.    Psychiatric: The patient's affect was calm, cooperative, and appropriate.     Communication:  Normal; communicates verbally, normal voice quality.    Respiratory: Breathing comfortably without stridor or exertion of accessory muscles.    Head/Face:  Normocephalic and atraumatic.  No lesions or scars.   Salivary glands -  Normal size, no tenderness, swelling, or palpable masses    Eyes: Pupils were equal and reactive.  Extraocular movement intact.    Ears: Pinnae and tragus non-tender.  EAC's and TM's were clear.     Oral " Cavity: Normal tongue, floor of mouth, buccal mucosa, and palate.  No lesions or masses on inspection or palpation.     Oropharynx: Normal mucosa, palate symmetric with normal elevation. No abnormal lymph tissue in the oropharynx. 2+ tonsils bilaterally. Soft and nontender with palpation.   Neck: Supple with normal laryngeal and tracheal landmarks.  The parotid beds were without masses.  No palpable thyroid.  Normal range of motion.    Lymphatic: There is no palpable lymphadenopathy in the neck.      Assessment/Plan:  Patient with abnormal tissue observed after upper GI in October 2024. Patient is asymptomatic today with exception of a new sore throat since NG was placed last week. Thorough evaluation of the right soft palate and tonsil area is normal bilaterally. Patient does have some prominent tonsil tissue bilaterally without any concerning findings.     Recommend monitoring for now. Previous erythema may be due to mucosal irritation for nausea/vomiting.     Patient can return to clinic as needed for any new mouth pain, ear pain, bleeding from the mouth or new ulcerations of the oral cavity.    Tracy Abbasi DNP, APRN, CNP  Otolaryngology  Head & Neck Surgery  [phone]    30 minutes spent on the date of the encounter doing chart review, history and exam, documentation and further activities per the note.

## 2025-02-23 ENCOUNTER — HOSPITAL ENCOUNTER (EMERGENCY)
Facility: CLINIC | Age: 20
Discharge: HOME | End: 2025-02-23
Attending: EMERGENCY MEDICINE | Admitting: EMERGENCY MEDICINE
Payer: COMMERCIAL

## 2025-02-23 VITALS
WEIGHT: 104.06 LBS | HEART RATE: 73 BPM | SYSTOLIC BLOOD PRESSURE: 105 MMHG | BODY MASS INDEX: 18.43 KG/M2 | TEMPERATURE: 98.1 F | RESPIRATION RATE: 18 BRPM | OXYGEN SATURATION: 99 % | DIASTOLIC BLOOD PRESSURE: 64 MMHG

## 2025-02-23 DIAGNOSIS — K55.1 SUPERIOR MESENTERIC ARTERY SYNDROME: ICD-10-CM

## 2025-02-23 DIAGNOSIS — R42 DIZZINESS: ICD-10-CM

## 2025-02-23 DIAGNOSIS — R00.2 PALPITATIONS: ICD-10-CM

## 2025-02-23 DIAGNOSIS — R10.12 ABDOMINAL PAIN, LEFT UPPER QUADRANT: ICD-10-CM

## 2025-02-23 LAB
ALBUMIN SERPL BCG-MCNC: 4.4 G/DL (ref 3.5–5.2)
ALP SERPL-CCNC: 50 U/L (ref 40–150)
ALT SERPL W P-5'-P-CCNC: 12 U/L (ref 0–50)
ANION GAP SERPL CALCULATED.3IONS-SCNC: 11 MMOL/L (ref 7–15)
AST SERPL W P-5'-P-CCNC: 21 U/L (ref 0–45)
BASOPHILS # BLD AUTO: 0 10E3/UL (ref 0–0.2)
BASOPHILS # BLD AUTO: 0.1 10E3/UL (ref 0–0.2)
BASOPHILS NFR BLD AUTO: 1 %
BASOPHILS NFR BLD AUTO: 1 %
BILIRUB SERPL-MCNC: 0.2 MG/DL
BUN SERPL-MCNC: 13.4 MG/DL (ref 6–20)
CALCIUM SERPL-MCNC: 9.3 MG/DL (ref 8.8–10.4)
CHLORIDE SERPL-SCNC: 101 MMOL/L (ref 98–107)
CREAT SERPL-MCNC: 0.74 MG/DL (ref 0.51–0.95)
EGFRCR SERPLBLD CKD-EPI 2021: >90 ML/MIN/1.73M2
EOSINOPHIL # BLD AUTO: 0.1 10E3/UL (ref 0–0.7)
EOSINOPHIL # BLD AUTO: 0.1 10E3/UL (ref 0–0.7)
EOSINOPHIL NFR BLD AUTO: 2 %
EOSINOPHIL NFR BLD AUTO: 2 %
ERYTHROCYTE [DISTWIDTH] IN BLOOD BY AUTOMATED COUNT: 12.2 % (ref 10–15)
ERYTHROCYTE [DISTWIDTH] IN BLOOD BY AUTOMATED COUNT: 12.3 % (ref 10–15)
GLUCOSE SERPL-MCNC: 88 MG/DL (ref 70–99)
HCG SERPL QL: NEGATIVE
HCO3 SERPL-SCNC: 25 MMOL/L (ref 22–29)
HCT VFR BLD AUTO: 35.9 % (ref 35–47)
HCT VFR BLD AUTO: 38.8 % (ref 35–47)
HGB BLD-MCNC: 12.2 G/DL (ref 11.7–15.7)
HGB BLD-MCNC: 13.1 G/DL (ref 11.7–15.7)
HOLD SPECIMEN: NORMAL
IMM GRANULOCYTES # BLD: 0 10E3/UL
IMM GRANULOCYTES # BLD: 0 10E3/UL
IMM GRANULOCYTES NFR BLD: 0 %
IMM GRANULOCYTES NFR BLD: 0 %
LACTATE SERPL-SCNC: 0.9 MMOL/L (ref 0.7–2)
LIPASE SERPL-CCNC: 41 U/L (ref 13–60)
LYMPHOCYTES # BLD AUTO: 2.6 10E3/UL (ref 0.8–5.3)
LYMPHOCYTES # BLD AUTO: 2.9 10E3/UL (ref 0.8–5.3)
LYMPHOCYTES NFR BLD AUTO: 48 %
LYMPHOCYTES NFR BLD AUTO: 50 %
MAGNESIUM SERPL-MCNC: 2 MG/DL (ref 1.7–2.3)
MCH RBC QN AUTO: 31.4 PG (ref 26.5–33)
MCH RBC QN AUTO: 31.5 PG (ref 26.5–33)
MCHC RBC AUTO-ENTMCNC: 33.8 G/DL (ref 31.5–36.5)
MCHC RBC AUTO-ENTMCNC: 34 G/DL (ref 31.5–36.5)
MCV RBC AUTO: 93 FL (ref 78–100)
MCV RBC AUTO: 93 FL (ref 78–100)
MONOCYTES # BLD AUTO: 0.4 10E3/UL (ref 0–1.3)
MONOCYTES # BLD AUTO: 0.5 10E3/UL (ref 0–1.3)
MONOCYTES NFR BLD AUTO: 8 %
MONOCYTES NFR BLD AUTO: 8 %
NEUTROPHILS # BLD AUTO: 2.1 10E3/UL (ref 1.6–8.3)
NEUTROPHILS # BLD AUTO: 2.3 10E3/UL (ref 1.6–8.3)
NEUTROPHILS NFR BLD AUTO: 39 %
NEUTROPHILS NFR BLD AUTO: 40 %
NRBC # BLD AUTO: 0 10E3/UL
NRBC # BLD AUTO: 0 10E3/UL
NRBC BLD AUTO-RTO: 0 /100
NRBC BLD AUTO-RTO: 0 /100
PLATELET # BLD AUTO: 191 10E3/UL (ref 150–450)
PLATELET # BLD AUTO: 201 10E3/UL (ref 150–450)
POTASSIUM SERPL-SCNC: 3.8 MMOL/L (ref 3.4–5.3)
PROT SERPL-MCNC: 7.6 G/DL (ref 6.4–8.3)
RBC # BLD AUTO: 3.88 10E6/UL (ref 3.8–5.2)
RBC # BLD AUTO: 4.16 10E6/UL (ref 3.8–5.2)
SODIUM SERPL-SCNC: 137 MMOL/L (ref 135–145)
TROPONIN T SERPL HS-MCNC: <6 NG/L
WBC # BLD AUTO: 5.3 10E3/UL (ref 4–11)
WBC # BLD AUTO: 5.9 10E3/UL (ref 4–11)

## 2025-02-23 PROCEDURE — 85004 AUTOMATED DIFF WBC COUNT: CPT | Performed by: EMERGENCY MEDICINE

## 2025-02-23 PROCEDURE — 93005 ELECTROCARDIOGRAM TRACING: CPT

## 2025-02-23 PROCEDURE — 36415 COLL VENOUS BLD VENIPUNCTURE: CPT | Performed by: EMERGENCY MEDICINE

## 2025-02-23 PROCEDURE — 96374 THER/PROPH/DIAG INJ IV PUSH: CPT

## 2025-02-23 PROCEDURE — 83735 ASSAY OF MAGNESIUM: CPT | Performed by: EMERGENCY MEDICINE

## 2025-02-23 PROCEDURE — 99284 EMERGENCY DEPT VISIT MOD MDM: CPT | Mod: 25

## 2025-02-23 PROCEDURE — 96361 HYDRATE IV INFUSION ADD-ON: CPT

## 2025-02-23 PROCEDURE — 80048 BASIC METABOLIC PNL TOTAL CA: CPT | Performed by: EMERGENCY MEDICINE

## 2025-02-23 PROCEDURE — 84484 ASSAY OF TROPONIN QUANT: CPT | Performed by: EMERGENCY MEDICINE

## 2025-02-23 PROCEDURE — 258N000003 HC RX IP 258 OP 636: Performed by: EMERGENCY MEDICINE

## 2025-02-23 PROCEDURE — 80053 COMPREHEN METABOLIC PANEL: CPT | Performed by: EMERGENCY MEDICINE

## 2025-02-23 PROCEDURE — 250N000011 HC RX IP 250 OP 636: Performed by: EMERGENCY MEDICINE

## 2025-02-23 PROCEDURE — 84703 CHORIONIC GONADOTROPIN ASSAY: CPT | Performed by: EMERGENCY MEDICINE

## 2025-02-23 PROCEDURE — 83690 ASSAY OF LIPASE: CPT | Performed by: EMERGENCY MEDICINE

## 2025-02-23 PROCEDURE — 85041 AUTOMATED RBC COUNT: CPT | Performed by: EMERGENCY MEDICINE

## 2025-02-23 PROCEDURE — 83605 ASSAY OF LACTIC ACID: CPT | Performed by: EMERGENCY MEDICINE

## 2025-02-23 RX ORDER — KETOROLAC TROMETHAMINE 15 MG/ML
15 INJECTION, SOLUTION INTRAMUSCULAR; INTRAVENOUS ONCE
Status: COMPLETED | OUTPATIENT
Start: 2025-02-23 | End: 2025-02-23

## 2025-02-23 RX ADMIN — KETOROLAC TROMETHAMINE 15 MG: 15 INJECTION, SOLUTION INTRAMUSCULAR; INTRAVENOUS at 22:01

## 2025-02-23 RX ADMIN — SODIUM CHLORIDE 1000 ML: 0.9 INJECTION, SOLUTION INTRAVENOUS at 22:02

## 2025-02-23 ASSESSMENT — ACTIVITIES OF DAILY LIVING (ADL)
ADLS_ACUITY_SCORE: 46
ADLS_ACUITY_SCORE: 46

## 2025-02-24 ENCOUNTER — PATIENT OUTREACH (OUTPATIENT)
Dept: CARE COORDINATION | Facility: CLINIC | Age: 20
End: 2025-02-24
Payer: COMMERCIAL

## 2025-02-24 LAB
ATRIAL RATE - MUSE: 69 BPM
DIASTOLIC BLOOD PRESSURE - MUSE: NORMAL MMHG
INTERPRETATION ECG - MUSE: NORMAL
P AXIS - MUSE: -25 DEGREES
PR INTERVAL - MUSE: 104 MS
QRS DURATION - MUSE: 76 MS
QT - MUSE: 378 MS
QTC - MUSE: 405 MS
R AXIS - MUSE: 72 DEGREES
SYSTOLIC BLOOD PRESSURE - MUSE: NORMAL MMHG
T AXIS - MUSE: 15 DEGREES
VENTRICULAR RATE- MUSE: 69 BPM

## 2025-02-24 NOTE — ED TRIAGE NOTES
Presents to triage with c/o LUQ abdominal pain, dizziness, and palpitations that started today.

## 2025-02-24 NOTE — PROGRESS NOTES
[facility] [place]  Community Health Worker Initial Outreach    CHW Initial Information Gathering:  Referral Source: ED Follow-Up  CHW Additional Questions  MyChart active?: Yes    Patient accepts CC: No, patient declined at this time. Patient will be sent Care Coordination introduction letter for future reference.       Anay Alvarado  Community Health Worker  [facility]    *Connected Care Resource Team does NOT follow patient ongoing. Referrals are identified based on internal discharge reports and the outreach is to ensure patient has an understanding of their discharge instructions.

## 2025-02-24 NOTE — ED PROVIDER NOTES
"  Emergency Department Note      History of Present Illness     Chief Complaint   Abdominal Pain, Dizziness, and Palpitations      ALFREDO Dill \"Jeny\" CHITO Curry is a 19 year old female with history of SMAS and nutcracker syndrome who presents to the ED for evaluation of abdominal pain, dizziness, and palpitations. Jeny reports she has ongoing issues with LUQ pain but her pain worsened last night and has been constant since. She ate scrambled eggs and salad today, and states eating worsened her pain. She endorses palpitations worse than normal for her and head-spinning dizziness. Jeny mentions that she went to the gym with a friend yesterday and walked on the treadmill with an incline for 20 minutes. This is not typical for her. She denies vomiting, diarrhea, or fever. Jeny had an NG tube placed on 2/13/25 and started supplemental nutrition 4 days ago. She notes she has been drinking water.    Independent Historian   Mother as detailed above.    Review of External Notes   I reviewed the office visit note to the [facility] transplant medicine center for nutcracker phenomenon of the renal vein and moderate protein calorie malnutrition on February 21, 2025.  Patient is noted to have a history of joint hypermobility, May-Thurner syndrome, nonsustained ventricular tachycardia, orthostatic hypotension negative for POTS, pelvic congestion syndrome, scoliosis, superior mesenteric artery syndrome, situational anxiety and depressive disorder.  NJ tube was placed on 213.  Started NG feeds on 220.    I reviewed imaging in epic.  She underwent CT angiogram on 2/21/2025 for renal donor evaluation.  I reviewed CTA on January 13, 2025.  No acute abnormalities.  Patient also had a CTA on November 12, 2024, May 30, 2024, April 3, 2024.    Past Medical History     Medical History and Problem List   Superior mesenteric artery syndrome  NSVT  PFO  Scoliosis  Benign joint hypermobility  Retro cerebellar arachnoid " cyst    Medications   Escitalopram  Drospirenone  Jencycla  Lubiprostone  Nortriptyline  Pregabalin  Rizatriptan    Surgical History   Appendectomy  Tilt table study  Labral tear repair, left    Physical Exam     Patient Vitals for the past 24 hrs:   BP Temp Temp src Pulse Resp SpO2 Weight   02/23/25 2323 105/64 -- -- 73 -- 99 % --   02/23/25 2136 111/75 98.1  F (36.7  C) Temporal 108 18 99 % 47.2 kg (104 lb 0.9 oz)     Physical Exam  General: Well-nourished  Eyes: PERRL, conjunctivae pink no scleral icterus or conjunctival injection  ENT:  Moist mucus membranes, posterior oropharynx clear without erythema or exudates. NJ in place  Respiratory:  Lungs clear to auscultation bilaterally, no crackles/rubs/wheezes.  Good air movement  CV: Mildly tachycardic rate and regular rhythm, no murmurs/rubs/gallops  GI:  Abdomen soft and non-distended.  Normoactive BS.  +LUQ tenderness, no guarding or rebound  Skin: Warm, dry.  No rashes or petechiae  Musculoskeletal: No peripheral edema or calf tenderness  Neuro: Alert and oriented to person/place/time  Psychiatric: Normal affect      Diagnostics     Lab Results   Labs Ordered and Resulted from Time of ED Arrival to Time of ED Departure   COMPREHENSIVE METABOLIC PANEL - Normal       Result Value    Sodium 137      Potassium 3.8      Carbon Dioxide (CO2) 25      Anion Gap 11      Urea Nitrogen 13.4      Creatinine 0.74      GFR Estimate >90      Calcium 9.3      Chloride 101      Glucose 88      Alkaline Phosphatase 50      AST 21      ALT 12      Protein Total 7.6      Albumin 4.4      Bilirubin Total 0.2     LIPASE - Normal    Lipase 41     HCG QUALITATIVE PREGNANCY - Normal    hCG Serum Qualitative Negative     LACTIC ACID WHOLE BLOOD WITH 1X REPEAT IN 2 HR WHEN >2 - Normal    Lactic Acid, Initial 0.9     TROPONIN T, HIGH SENSITIVITY - Normal    Troponin T, High Sensitivity <6     MAGNESIUM - Normal    Magnesium 2.0     CBC WITH PLATELETS AND DIFFERENTIAL    WBC Count 5.3       RBC Count 4.16      Hemoglobin 13.1      Hematocrit 38.8      MCV 93      MCH 31.5      MCHC 33.8      RDW 12.2      Platelet Count 201      % Neutrophils 40      % Lymphocytes 48      % Monocytes 8      % Eosinophils 2      % Basophils 1      % Immature Granulocytes 0      NRBCs per 100 WBC 0      Absolute Neutrophils 2.1      Absolute Lymphocytes 2.6      Absolute Monocytes 0.4      Absolute Eosinophils 0.1      Absolute Basophils 0.0      Absolute Immature Granulocytes 0.0      Absolute NRBCs 0.0     CBC WITH PLATELETS AND DIFFERENTIAL    WBC Count 5.9      RBC Count 3.88      Hemoglobin 12.2      Hematocrit 35.9      MCV 93      MCH 31.4      MCHC 34.0      RDW 12.3      Platelet Count 191      % Neutrophils 39      % Lymphocytes 50      % Monocytes 8      % Eosinophils 2      % Basophils 1      % Immature Granulocytes 0      NRBCs per 100 WBC 0      Absolute Neutrophils 2.3      Absolute Lymphocytes 2.9      Absolute Monocytes 0.5      Absolute Eosinophils 0.1      Absolute Basophils 0.1      Absolute Immature Granulocytes 0.0      Absolute NRBCs 0.0         Imaging   None    EKG   ECG taken at 2138, ECG read at 2142  Sinus rhythm with short WY  Otherwise normal ECG   Rate 69 bpm. WY interval 104 ms. QRS duration 76 ms. QT/QTc 378/405 ms. P-R-T axes -25 72 15.    Independent Interpretation   None    ED Course      Medications Administered   Medications   sodium chloride 0.9% BOLUS 1,000 mL (0 mLs Intravenous Stopped 2/23/25 2324)   ketorolac (TORADOL) injection 15 mg (15 mg Intravenous $Given 2/23/25 2201)       Discussion of Management   None    ED Course   ED Course as of 02/24/25 0313   Sun Feb 23, 2025 2148 I obtained history and examined the patient as noted above.    2332 I rechecked the patient and explained findings. We discussed plans for discharge and the patient is comfortable with this plan.        Additional Documentation  None    Medical Decision Making / Diagnosis     CMS Diagnoses:  None    MIPS   None    [facility]   Aniyah Curry is a 19 year old female who comes with an acute exacerbation of her left upper quadrant abdominal pain which is chronic as well as a feeling of lightheaded or dizziness with some palpitations.  EKG was nonischemic.  There were no malignant arrhythmias.  Electrolytes are reassuring.  She is not anemic.  No signs of kidney failure.  She was treated with IV fluids as well as some Toradol and had improvement in her symptoms.  I discussed with the patient and her mom that I would like to try to avoid another CT scan as she had 1 just 3 days prior and she has had multiple in the previous year.  I suspect this is consistent with her chronic abdominal pain and I have low suspicion for an acute abnormality.  No fever, no other symptoms.  She felt comfortable with the plan for discharge home and to follow-up with her primary care doctor.  Declined any additional medications.  At this time, with reasonable clinical competence, I do believe she safe for discharge home.    Disposition   The patient was discharged.     Diagnosis     ICD-10-CM    1. Abdominal pain, left upper quadrant  R10.12       2. Dizziness  R42       3. Superior mesenteric artery syndrome  K55.1       4. Palpitations  R00.2            Discharge Medications   Discharge Medication List as of 2/23/2025 11:36 PM            Scribe Disclosure:  I, Sara Reza, am serving as a scribe at 9:47 PM on 2/23/2025 to document services personally performed by Beth Bowles MD based on my observations and the provider's statements to me.        Beth Bowles MD  02/24/25 031

## 2025-02-24 NOTE — DISCHARGE INSTRUCTIONS
*Get plenty of rest and avoid strenuous activities.  *No new medications.  *Follow-up with your doctor for a recheck in the next week.  *Return to the ER if you develop fever, worsening pain, pain that moves to the right lower abdomen, faint or feel like you will faint or become worse in any way.    Discharge Instructions  Abdominal Pain    Abdominal pain (belly pain) can be caused by many things. Your evaluation today does not show the exact cause for your pain. Your provider today has decided that it is unlikely your pain is due to a life threatening problem, or a problem requiring surgery or hospital admission. Sometimes those problems cannot be found right away, so it is very important that you follow up as directed.  Sometimes only the changes which occur over time allow the cause of your pain to be found.    Generally, every Emergency Department visit should have a follow-up clinic visit with either a primary or a specialty clinic/provider. Please follow-up as instructed by your emergency provider today. With abdominal pain, we often recommend very close follow-up, such as the following day.    ADULTS:  Return to the Emergency Department right away if:    You get an oral temperature above 102oF or as directed by your provider.  You have blood in your stools. This may be bright red or appear as black, tarry stools.    You keep vomiting (throwing up) or cannot drink liquids.  You see blood when you vomit.   You cannot have a bowel movement or you cannot pass gas.  Your stomach gets bloated or bigger.  Your skin or the whites of your eyes look yellow.  You faint.  You have bloody, frequent or painful urination (peeing).  You have new symptoms or anything that worries you.    CHILDREN:  Return to the Emergency Department right away if your child has any of the above-listed symptoms or the following:    Pushes your hand away or screams/cries when his/her belly is touched.  You notice your child is very fussy or  weak.  Your child is very tired and is too tired to eat or drink.  Your child is dehydrated.  Signs of dehydration can be:  Significant change in the amount of wet diapers/urine.  Your infant or child starts to have dry mouth and lips, or no saliva (spit) or tears.    PREGNANT WOMEN:  Return to the Emergency Department right away if you have any of the above-listed symptoms or the following:    You have bleeding, leaking fluid or passing tissue from the vagina.  You have worse pain or cramping, or pain in your shoulder or back.  You have vomiting that will not stop.  You have a temperature of 100oF or more.  Your baby is not moving as much as usual.  You faint.  You get a bad headache with or without eye problems and abdominal pain.  You have a seizure.  You have unusual discharge from your vagina and abdominal pain.    Abdominal pain is pretty common during pregnancy.  Your pain may or may not be related to your pregnancy. You should follow-up closely with your OB provider so they can evaluate you and your baby.  Until you follow-up with your regular provider, do the following:     Avoid sex and do not put anything in your vagina.  Drink clear fluids.  Only take medications approved by your provider.    MORE INFORMATION:    Appendicitis:  A possible cause of abdominal pain in any person who still has their appendix is acute appendicitis. Appendicitis is often hard to diagnose.  Testing does not always rule out early appendicitis or other causes of abdominal pain. Close follow-up with your provider and re-evaluations may be needed to figure out the reason for your abdominal pain.    Follow-up:  It is very important that you make an appointment with your clinic and go to the appointment.  If you do not follow-up with your primary provider, it may result in missing an important development which could result in permanent injury or disability and/or lasting pain.  If there is any problem keeping your appointment, call  "your provider or return to the Emergency Department.    Medications:  Take your medications as directed by your provider today.  Before using over-the-counter medications, ask your provider and make sure to take the medications as directed.  If you have any questions about medications, ask your provider.    Diet:  Resume your normal diet as much as possible, but do not eat fried, fatty or spicy foods while you have pain.  Do not drink alcohol or have caffeine.  Do not smoke tobacco.    Probiotics: If you have been given an antibiotic, you may want to also take a probiotic pill or eat yogurt with live cultures. Probiotics have \"good bacteria\" to help your intestines stay healthy. Studies have shown that probiotics help prevent diarrhea (loose stools) and other intestine problems (including C. diff infection) when you take antibiotics. You can buy these without a prescription in the pharmacy section of the store.     If you were given a prescription for medicine here today, be sure to read all of the information (including the package insert) that comes with your prescription.  This will include important information about the medicine, its side effects, and any warnings that you need to know about.  The pharmacist who fills the prescription can provide more information and answer questions you may have about the medicine.  If you have questions or concerns that the pharmacist cannot address, please call or return to the Emergency Department.       Remember that you can always come back to the Emergency Department if you are not able to see your regular provider in the amount of time listed above, if you get any new symptoms, or if there is anything that worries you.    Discharge Instructions  Dizziness (Lightheaded)  Today you were seen for dizziness.  Dizziness can be caused by many things and it can be very difficult to determine the cause of dizziness.  At this time, your provider has found no signs that your " dizziness is due to a serious or life-threatening condition. However, sometimes there is a serious problem that does not show up right away, and it is important for you to follow up with your regular provider as instructed.  Generally, every Emergency Department visit should have a follow-up clinic visit with either a primary or a specialty clinic/provider. Please follow-up as instructed by your emergency provider today.      Return to the Emergency Department if:    You pass out (fainting or falling out), especially during exercise.    You develop chest pain, chest pressure or difficulty breathing.  Your feel an irregular heartbeat.  You have excessive vaginal bleeding, or blood in your stool or vomit (throw up).  You have a high fever.  Your symptoms get worse or more frequent.    If when you begin to feel dizzy or lightheaded, it is important to sit down or lay down immediately to prevent injury from falling.  If you were given a prescription for medicine here today, be sure to read all of the information (including the package insert) that comes with your prescription.  This will include important information about the medicine, its side effects, and any warnings that you need to know about.  The pharmacist who fills the prescription can provide more information and answer questions you may have about the medicine.  If you have questions or concerns that the pharmacist cannot address, please call or return to the Emergency Department.   Remember that you can always come back to the Emergency Department if you are not able to see your regular provider in the amount of time listed above, if you get any new symptoms, or if there is anything that worries you.

## 2025-02-24 NOTE — LETTER
Aniyah Curry  [address] [place] [id number]    Dear Aniyah Curry,      I am a team member within the [facility] Care Resource Center with [facility]. I recently contacted you to ensure you are doing well following a visit within our health system. I also wanted to take this chance to introduce Clinic Care Coordination should you have any interest in this program in the future.    Below is a description of Clinic Care Coordination and how this team can further assist you:       The Clinic Care Coordination team is made up of a Registered Nurse, , Financial Resource Worker, and a Community Health Worker who understand and can help navigate the health care system. The goal of clinic care coordination is to help you manage your health, improve access to care, and achieve optimal health outcomes. They work alongside your provider to assist you in determining your health and social needs, obtain health care and community resources, and provide you with necessary information and education. Clinic Care Coordination can work with you through any barriers and develop a care plan that helps coordinate and strengthen the relationship between you and your care team.    If you wish to connect with the Clinic Care Coordination Team, please let your [facility] Primary Care Provider or Clinic Care Team know and they can place a referral. The Clinic Care Coordination team will then reach out by phone to further support you.    We are focused on providing you with the highest-quality healthcare experience possible.    Sincerely,   Your care team with [facility]'s [facility] ([phone]).

## 2025-02-25 ENCOUNTER — VIRTUAL VISIT (OUTPATIENT)
Dept: PEDIATRICS | Facility: CLINIC | Age: 20
End: 2025-02-25
Payer: COMMERCIAL

## 2025-02-25 DIAGNOSIS — E44.0 MODERATE PROTEIN-CALORIE MALNUTRITION: ICD-10-CM

## 2025-02-25 DIAGNOSIS — Z78.9 ON TUBE FEEDING DIET: ICD-10-CM

## 2025-02-25 DIAGNOSIS — I87.1 NUTCRACKER PHENOMENON OF RENAL VEIN: Primary | ICD-10-CM

## 2025-02-25 PROCEDURE — 98005 SYNCH AUDIO-VIDEO EST LOW 20: CPT | Performed by: INTERNAL MEDICINE

## 2025-02-25 SDOH — HEALTH STABILITY: PHYSICAL HEALTH: ON AVERAGE, HOW MANY DAYS PER WEEK DO YOU ENGAGE IN MODERATE TO STRENUOUS EXERCISE (LIKE A BRISK WALK)?: 7 DAYS

## 2025-02-25 SDOH — HEALTH STABILITY: PHYSICAL HEALTH: ON AVERAGE, HOW MANY MINUTES DO YOU ENGAGE IN EXERCISE AT THIS LEVEL?: 60 MIN

## 2025-02-25 ASSESSMENT — SOCIAL DETERMINANTS OF HEALTH (SDOH): HOW OFTEN DO YOU GET TOGETHER WITH FRIENDS OR RELATIVES?: MORE THAN THREE TIMES A WEEK

## 2025-02-25 NOTE — PROGRESS NOTES
Jeny is a 19 year old who is being evaluated via a billable video visit.    How would you like to obtain your AVS? MyChart  If the video visit is dropped, the invitation should be resent by: Text to cell phone: [phone]  Will anyone else be joining your video visit? No      Assessment & Plan     Nutcracker phenomenon of renal vein  Has now met with surgeon in [place], who is considering surgical options, including autotransplantation of affected kidney, other vascular approach options. Currently waiting to hear from them. In the meanwhile, she has now been on NJ feedings for 2 weeks. Weight is up 4 lbs and the hope has been that if her weight is up, this will reduce the vascular compression in her abdomen, reducing her symptoms. Feels ok most days. Did have pain exacerbation after exercising, thought maybe due to dehydration worsening perfusion.    Moderate protein-calorie malnutrition  See above. Weight up 4 lbs in 2 weeks on her home scale. Tube nutrition managed by GI. At this time, she reports she is unsure of the plan for duration of feeds. I did recommend she reach out to her GI and let them know how she is doing, and also ask when they want to see her again to determine duration of trial of feedings.    On tube feeding diet    The longitudinal plan of care for the diagnosis(es)/condition(s) as documented were addressed during this visit. Due to the added complexity in care, I will continue to support Jeny in the subsequent management and with ongoing continuity of care.    Counseling  Appropriate preventive services were addressed with this patient via screening, questionnaire, or discussion as appropriate for fall prevention, nutrition, physical activity, Tobacco-use cessation, social engagement, weight loss and cognition.  Checklist reviewing preventive services available has been given to the patient.  Reviewed patient's diet, addressing concerns and/or questions.   She is at risk for psychosocial  distress and has been provided with information to reduce risk.       See Patient Instructions    Subjective   Jeny is a 19 year old, presenting for the following health issues:  RECHECK      2/25/2025     1:58 PM   Additional Questions   Roomed by patrick   Accompanied by parul         2/25/2025     1:58 PM   Patient Reported Additional Medications   Patient reports taking the following new medications parul     HPI       Did go to [place] and had venogram that showed severe compression. Waiting to hear back if surgery is needed.    Feeding tube going well. Yesterday weight was 102 lbs. Up 4 lbs from before NJ tube on home scale.     In ED 2 days ago, went to gym and was on the treadmill. Next day felt super dizzy and had increased abdominal pain. Thought maybe due to dehydration.        Review of Systems  Constitutional, HEENT, cardiovascular, pulmonary, gi and gu systems are negative, except as otherwise noted.      Objective           Vitals:  No vitals were obtained today due to virtual visit.    Physical Exam   GENERAL: alert and no distress  EYES: Eyes grossly normal to inspection.  No discharge or erythema, or obvious scleral/conjunctival abnormalities.  RESP: No audible wheeze, cough, or visible cyanosis.    SKIN: Visible skin clear. No significant rash, abnormal pigmentation or lesions.  NEURO: Cranial nerves grossly intact.  Mentation and speech appropriate for age.  PSYCH: Appropriate affect, tone, and pace of words        Video-Visit Details    Type of service:  Video Visit   Originating Location (pt. Location): Home    Distant Location (provider location):  On-site  Platform used for Video Visit: Nuria  Signed Electronically by: Maria Elena Albarado MD

## 2025-02-28 ENCOUNTER — TRANSFERRED RECORDS (OUTPATIENT)
Dept: HEALTH INFORMATION MANAGEMENT | Facility: CLINIC | Age: 20
End: 2025-02-28
Payer: COMMERCIAL

## 2025-03-18 ENCOUNTER — OFFICE VISIT (OUTPATIENT)
Dept: CARDIOLOGY | Facility: CLINIC | Age: 20
End: 2025-03-18
Attending: INTERNAL MEDICINE
Payer: COMMERCIAL

## 2025-03-18 VITALS
OXYGEN SATURATION: 98 % | WEIGHT: 108 LBS | DIASTOLIC BLOOD PRESSURE: 74 MMHG | HEART RATE: 77 BPM | BODY MASS INDEX: 19.13 KG/M2 | SYSTOLIC BLOOD PRESSURE: 114 MMHG

## 2025-03-18 DIAGNOSIS — I95.1 ORTHOSTATIC HYPOTENSION: ICD-10-CM

## 2025-03-18 DIAGNOSIS — R55 SYNCOPE AND COLLAPSE: ICD-10-CM

## 2025-03-18 PROCEDURE — 99211 OFF/OP EST MAY X REQ PHY/QHP: CPT | Performed by: INTERNAL MEDICINE

## 2025-03-18 PROCEDURE — 99215 OFFICE O/P EST HI 40 MIN: CPT | Performed by: INTERNAL MEDICINE

## 2025-03-18 PROCEDURE — 1126F AMNT PAIN NOTED NONE PRSNT: CPT | Performed by: INTERNAL MEDICINE

## 2025-03-18 PROCEDURE — 3074F SYST BP LT 130 MM HG: CPT | Performed by: INTERNAL MEDICINE

## 2025-03-18 PROCEDURE — 3078F DIAST BP <80 MM HG: CPT | Performed by: INTERNAL MEDICINE

## 2025-03-18 RX ORDER — FLUDROCORTISONE ACETATE 0.1 MG/1
0.1 TABLET ORAL DAILY
Qty: 90 TABLET | Refills: 3 | Status: SHIPPED | OUTPATIENT
Start: 2025-03-18

## 2025-03-18 ASSESSMENT — PAIN SCALES - GENERAL: PAINLEVEL_OUTOF10: NO PAIN (0)

## 2025-03-18 NOTE — LETTER
3/18/2025      RE: Aniyah Curry  [address] [facility] [address]       Dear Colleague,    Thank you for the opportunity to participate in the care of your patient, Aniyah Curry, at the [facility] HEART [facility]. Please see a copy of my visit note below.    HPI:   Jeny is a 19-year-old college student who is seen in this clinic for treatment of orthostatic hypotension and vasovagal symptoms.  In the recent past treatment is focused on salt and volume with electrolyte fluids.  This has had some benefit but she continues to have problems with orthostatic lightheadedness.    Karmen does have gastrointestinal/renal issues and may be having kidney autotransplant surgery in [facility] this summer for nutcracker syndrome.  Currently she is working on improving her weight for which she had a feeding tube until very recently.  Consequently obtaining adequate salt and electrolyte intake may have been limited by her GI issues.    At today's visit we reviewed other approaches to trying to improve her postural tolerance and to that extent we will start fludrocortisone 0.1 daily and have her take 1 to 3 capsules of Vitassium to help supplement her sodium intake.    We will plan to follow-up in about 6 months time.    PAST MEDICAL HISTORY:  Past Medical History:   Diagnosis Date     RetroCerebellar arachnoid cyst      Seasonal allergic rhinitis        CURRENT MEDICATIONS:  Current Outpatient Medications   Medication Sig Dispense Refill     azelaic acid (FINACIA) 15 % external gel Apply 1 1e15 Vector Genomes topically.       drospirenone (SLYND) 4 MG TABS tablet Take 4 mg by mouth.       escitalopram (LEXAPRO) 5 MG tablet Take 1.5 tablets (7.5 mg) by mouth daily. 135 tablet 1     metroNIDAZOLE (METROCREAM) 0.75 % external cream Apply 1 cm topically.       ondansetron (ZOFRAN ODT) 4 MG ODT tab Take 1-2 tablets (4-8 mg) by mouth every 8  hours as needed for nausea 20 tablet 1     polyethylene glycol (MIRALAX) 17 GM/Dose powder Take 17 g by mouth.       Sulfacetamide Sodium-Sulfur 8-4 % SUSP Apply 1 cm topically.       SUMAtriptan (IMITREX) 50 MG tablet TAKE 1 TABLET BY MOUTH AT ONSET OF MIGRAINE; MAY TAKE 1 ADDITIONAL TABLET IN 2 HRS IF NEEDED. MAX DOSE  MG/DAY  MG/WEEK.*       vitamin C (ASCORBIC ACID) 500 MG tablet Take 500 mg by mouth daily.       JENCYCLA 0.35 MG tablet Take 1 tablet by mouth daily at 2 pm (Patient not taking: Reported on 3/18/2025)       lubiprostone (AMITIZA) 8 MCG capsule Take 8 mcg by mouth. (Patient not taking: Reported on 3/18/2025)       nortriptyline (PAMELOR) 25 MG capsule Take 25 mg by mouth at bedtime. (Patient not taking: Reported on 3/18/2025)       pregabalin (LYRICA) 25 MG capsule Take 25 mg by mouth at bedtime. (Patient not taking: Reported on 3/18/2025)         PAST SURGICAL HISTORY:  Past Surgical History:   Procedure Laterality Date     APPENDECTOMY       EP STUDY TILT TABLE N/A 5/8/2024    Procedure: Tilt Table Study;  Surgeon: Issac Jiménez MD;  Location: [facility] CARDIAC CATH LAB     ESOPHAGOSCOPY, GASTROSCOPY, DUODENOSCOPY (EGD), COMBINED N/A 6/13/2024    Procedure: Esophagoscopy, gastroscopy, duodenoscopy (EGD), combined with biopsies with biopsy forceps;  Surgeon: Berhane Eastman MD;  Location:  GI     Labral tear repair Left 01/2022       ALLERGIES:     Allergies   Allergen Reactions     No Known Drug Allergy        FAMILY HISTORY:  Family History   Problem Relation Age of Onset     Hypothyroidism Mother      Unknown/Adopted Mother      Anesthesia Reaction Mother      Thyroid Disease Mother         Hashimoto's     Hypothyroidism Father      Thyroid Disease Father         Hypothyroidism and vitiligo     Hypothyroidism Maternal Grandmother      Thyroid Disease Maternal Grandmother         Hypothyroidism     Cerebrovascular Disease Maternal Grandfather         2014     Prostate  Cancer Maternal Grandfather      Hypothyroidism Paternal Grandmother      Thyroid Disease Paternal Grandmother         Hyposthyroidism     Coronary Artery Disease Paternal Grandfather      Hypertension Paternal Grandfather      Anxiety Disorder Paternal Grandfather      Gastrointestinal Disease Brother 5        celiac disease     Hypothyroidism Brother      Asthma Brother      Genetic Disorder Brother         Celiac Disease     Thyroid Disease Brother         Autoimmune Hypothyroidism     Genetic Disorder Cousin         JRA     Breast Cancer Other         Aunt     Colon Cancer No family hx of      SOCIAL HISTORY:  Social History     Tobacco Use     Smoking status: Never     Passive exposure: Never     Smokeless tobacco: Never   Vaping Use     Vaping status: Never Used   Substance Use Topics     Alcohol use: No     Drug use: No       ROS:   Constitutional: No fever, chills, or sweats. Weight stable.   ENT: No visual disturbance, ear ache, epistaxis, sore throat.   Cardiovascular: As per HPI.   Respiratory: No cough, hemoptysis.    GI: No nausea, vomiting,.   : No hematuria.   Integument: Negative.   Psychiatric: Negative.   Hematologic:  no easy bleeding.  Neuro: Negative.   Endocrinology: No significant heat or cold intolerance   Musculoskeletal: No myalgia.    Exam:  /74 (BP Location: Right arm, Patient Position: Chair, Cuff Size: Adult Regular)   Pulse 77   Wt 49 kg (108 lb)   SpO2 98%   BMI 19.13 kg/m    GENERAL APPEARANCE: healthy, alert and no distress  HEENT: no icterus, t, no central cyanosis  NECK:  JVP not elevated  RESPIRATORY: - no rales, rhonchi or wheezes, no use of accessory muscles, no retractions, respirations are unlabored, normal respiratory rate  NEURO: alert and oriented to person/place/time, normal speech, gait and affect  SKIN: no ecchymoses, no rashes    Labs:  CBC RESULTS:   Lab Results   Component Value Date    WBC 5.9 02/23/2025    WBC 6.2 04/13/2014    RBC 3.88 02/23/2025     "RBC 4.32 05/07/2013    HGB 12.2 02/23/2025    HGB 13.2 05/07/2013    HCT 35.9 02/23/2025    HCT 38.0 05/07/2013    MCV 93 02/23/2025    MCV 88 05/07/2013    MCH 31.4 02/23/2025    MCH 30.6 05/07/2013    MCHC 34.0 02/23/2025    MCHC 34.7 05/07/2013    RDW 12.3 02/23/2025    RDW 11.9 05/07/2013     02/23/2025     05/07/2013       BMP RESULTS:  Lab Results   Component Value Date     02/23/2025     07/03/2012    POTASSIUM 3.8 02/23/2025    POTASSIUM 3.4 12/30/2021    POTASSIUM 4.0 07/03/2012    CHLORIDE 101 02/23/2025    CHLORIDE 105 12/30/2021    CHLORIDE 104 07/03/2012    CO2 25 02/23/2025    CO2 28 12/30/2021    CO2 26 07/03/2012    ANIONGAP 11 02/23/2025    ANIONGAP 5 12/30/2021    ANIONGAP 9 07/03/2012    GLC 88 02/23/2025    GLC 93 12/30/2021    GLC 94 07/03/2012    BUN 13.4 02/23/2025    BUN 10 12/30/2021    BUN 14 07/03/2012    CR 0.74 02/23/2025    CR 0.33 07/03/2012    GFRESTIMATED >90 02/23/2025    GFRESTIMATED >60 11/12/2024    GFRESTIMATED GFR not calculated, patient <16 years old. 07/03/2012    GFRESTBLACK GFR not calculated, patient <16 years old. 07/03/2012    CHRISSIE 9.3 02/23/2025    CHRISSIE 9.5 07/03/2012        INR RESULTS:  No results found for: \"INR\"    Procedures:  PULMONARY FUNCTION TESTS:        No data to display                  ECHOCARDIOGRAM:   No results found for this or any previous visit (from the past 8760 hours).      Assessment and Plan:   1.  Nutcracker syndrome  2.  Immediate orthostatic hypotension  3.  Nutritional limitation    Plan  1.  Sent prescription for fludrocortisone 0.1 daily to [facility] pharmacy in [place]-3-month supply-4 refills  2.  Provide patient instruction on obtaining Vitassium 500 mg caplets-2-3 daily  3.  Return to clinic 6 months-video okay    Total elapsed time today with chart review, clinic visit and documentation 40 minutes    I very much appreciated the opportunity to see and assess Aniyah Curry in the clinic today please do not " hesitate to contact my office if you have any questions or concerns.      Issac Jiménez MD  Cardiac Arrhythmia Service  [facility]  [phone]       CC  ISSAC JIMÉNEZ      Please do not hesitate to contact me if you have any questions/concerns.     Sincerely,     Issac Jiménez MD

## 2025-03-18 NOTE — PROGRESS NOTES
HPI:   Jeny is a 19-year-old college student who is seen in this clinic for treatment of orthostatic hypotension and vasovagal symptoms.  In the recent past treatment is focused on salt and volume with electrolyte fluids.  This has had some benefit but she continues to have problems with orthostatic lightheadedness.    Karmen does have gastrointestinal/renal issues and may be having kidney autotransplant surgery in [facility] this summer for nutcracker syndrome.  Currently she is working on improving her weight for which she had a feeding tube until very recently.  Consequently obtaining adequate salt and electrolyte intake may have been limited by her GI issues.    At today's visit we reviewed other approaches to trying to improve her postural tolerance and to that extent we will start fludrocortisone 0.1 daily and have her take 1 to 3 capsules of Vitassium to help supplement her sodium intake.    We will plan to follow-up in about 6 months time.    PAST MEDICAL HISTORY:  Past Medical History:   Diagnosis Date    RetroCerebellar arachnoid cyst     Seasonal allergic rhinitis        CURRENT MEDICATIONS:  Current Outpatient Medications   Medication Sig Dispense Refill    azelaic acid (FINACIA) 15 % external gel Apply 1 1e15 Vector Genomes topically.      drospirenone (SLYND) 4 MG TABS tablet Take 4 mg by mouth.      escitalopram (LEXAPRO) 5 MG tablet Take 1.5 tablets (7.5 mg) by mouth daily. 135 tablet 1    metroNIDAZOLE (METROCREAM) 0.75 % external cream Apply 1 cm topically.      ondansetron (ZOFRAN ODT) 4 MG ODT tab Take 1-2 tablets (4-8 mg) by mouth every 8 hours as needed for nausea 20 tablet 1    polyethylene glycol (MIRALAX) 17 GM/Dose powder Take 17 g by mouth.      Sulfacetamide Sodium-Sulfur 8-4 % SUSP Apply 1 cm topically.      SUMAtriptan (IMITREX) 50 MG tablet TAKE 1 TABLET BY MOUTH AT ONSET OF MIGRAINE; MAY TAKE 1 ADDITIONAL TABLET IN 2 HRS IF NEEDED. MAX DOSE  MG/DAY  MG/WEEK.*       vitamin C (ASCORBIC ACID) 500 MG tablet Take 500 mg by mouth daily.      JENCYCLA 0.35 MG tablet Take 1 tablet by mouth daily at 2 pm (Patient not taking: Reported on 3/18/2025)      lubiprostone (AMITIZA) 8 MCG capsule Take 8 mcg by mouth. (Patient not taking: Reported on 3/18/2025)      nortriptyline (PAMELOR) 25 MG capsule Take 25 mg by mouth at bedtime. (Patient not taking: Reported on 3/18/2025)      pregabalin (LYRICA) 25 MG capsule Take 25 mg by mouth at bedtime. (Patient not taking: Reported on 3/18/2025)         PAST SURGICAL HISTORY:  Past Surgical History:   Procedure Laterality Date    APPENDECTOMY      EP STUDY TILT TABLE N/A 5/8/2024    Procedure: Tilt Table Study;  Surgeon: Issac Jiménez MD;  Location:  HEART CARDIAC CATH LAB    ESOPHAGOSCOPY, GASTROSCOPY, DUODENOSCOPY (EGD), COMBINED N/A 6/13/2024    Procedure: Esophagoscopy, gastroscopy, duodenoscopy (EGD), combined with biopsies with biopsy forceps;  Surgeon: Berhane Eastman MD;  Location:  GI    Labral tear repair Left 01/2022       ALLERGIES:     Allergies   Allergen Reactions    No Known Drug Allergy        FAMILY HISTORY:  Family History   Problem Relation Age of Onset    Hypothyroidism Mother     Unknown/Adopted Mother     Anesthesia Reaction Mother     Thyroid Disease Mother         Hashimoto's    Hypothyroidism Father     Thyroid Disease Father         Hypothyroidism and vitiligo    Hypothyroidism Maternal Grandmother     Thyroid Disease Maternal Grandmother         Hypothyroidism    Cerebrovascular Disease Maternal Grandfather         2014    Prostate Cancer Maternal Grandfather     Hypothyroidism Paternal Grandmother     Thyroid Disease Paternal Grandmother         Hyposthyroidism    Coronary Artery Disease Paternal Grandfather     Hypertension Paternal Grandfather     Anxiety Disorder Paternal Grandfather     Gastrointestinal Disease Brother 5        celiac disease    Hypothyroidism Brother     Asthma Brother     Genetic  Disorder Brother         Celiac Disease    Thyroid Disease Brother         Autoimmune Hypothyroidism    Genetic Disorder Cousin         JRA    Breast Cancer Other         Aunt    Colon Cancer No family hx of      SOCIAL HISTORY:  Social History     Tobacco Use    Smoking status: Never     Passive exposure: Never    Smokeless tobacco: Never   Vaping Use    Vaping status: Never Used   Substance Use Topics    Alcohol use: No    Drug use: No       ROS:   Constitutional: No fever, chills, or sweats. Weight stable.   ENT: No visual disturbance, ear ache, epistaxis, sore throat.   Cardiovascular: As per HPI.   Respiratory: No cough, hemoptysis.    GI: No nausea, vomiting,.   : No hematuria.   Integument: Negative.   Psychiatric: Negative.   Hematologic:  no easy bleeding.  Neuro: Negative.   Endocrinology: No significant heat or cold intolerance   Musculoskeletal: No myalgia.    Exam:  /74 (BP Location: Right arm, Patient Position: Chair, Cuff Size: Adult Regular)   Pulse 77   Wt 49 kg (108 lb)   SpO2 98%   BMI 19.13 kg/m    GENERAL APPEARANCE: healthy, alert and no distress  HEENT: no icterus, t, no central cyanosis  NECK:  JVP not elevated  RESPIRATORY: - no rales, rhonchi or wheezes, no use of accessory muscles, no retractions, respirations are unlabored, normal respiratory rate  NEURO: alert and oriented to person/place/time, normal speech, gait and affect  SKIN: no ecchymoses, no rashes    Labs:  CBC RESULTS:   Lab Results   Component Value Date    WBC 5.9 02/23/2025    WBC 6.2 04/13/2014    RBC 3.88 02/23/2025    RBC 4.32 05/07/2013    HGB 12.2 02/23/2025    HGB 13.2 05/07/2013    HCT 35.9 02/23/2025    HCT 38.0 05/07/2013    MCV 93 02/23/2025    MCV 88 05/07/2013    MCH 31.4 02/23/2025    MCH 30.6 05/07/2013    MCHC 34.0 02/23/2025    MCHC 34.7 05/07/2013    RDW 12.3 02/23/2025    RDW 11.9 05/07/2013     02/23/2025     05/07/2013       BMP RESULTS:  Lab Results   Component Value Date    NA  "137 02/23/2025     07/03/2012    POTASSIUM 3.8 02/23/2025    POTASSIUM 3.4 12/30/2021    POTASSIUM 4.0 07/03/2012    CHLORIDE 101 02/23/2025    CHLORIDE 105 12/30/2021    CHLORIDE 104 07/03/2012    CO2 25 02/23/2025    CO2 28 12/30/2021    CO2 26 07/03/2012    ANIONGAP 11 02/23/2025    ANIONGAP 5 12/30/2021    ANIONGAP 9 07/03/2012    GLC 88 02/23/2025    GLC 93 12/30/2021    GLC 94 07/03/2012    BUN 13.4 02/23/2025    BUN 10 12/30/2021    BUN 14 07/03/2012    CR 0.74 02/23/2025    CR 0.33 07/03/2012    GFRESTIMATED >90 02/23/2025    GFRESTIMATED >60 11/12/2024    GFRESTIMATED GFR not calculated, patient <16 years old. 07/03/2012    GFRESTBLACK GFR not calculated, patient <16 years old. 07/03/2012    CHRISSIE 9.3 02/23/2025    CHRISSIE 9.5 07/03/2012        INR RESULTS:  No results found for: \"INR\"    Procedures:  PULMONARY FUNCTION TESTS:        No data to display                  ECHOCARDIOGRAM:   No results found for this or any previous visit (from the past 8760 hours).      Assessment and Plan:   1.  Nutcracker syndrome  2.  Immediate orthostatic hypotension  3.  Nutritional limitation    Plan  1.  Sent prescription for fludrocortisone 0.1 daily to [facility] pharmacy in [place]-3-month supply-4 refills  2.  Provide patient instruction on obtaining Vitassium 500 mg caplets-2-3 daily  3.  Return to clinic 6 months-video okay    Total elapsed time today with chart review, clinic visit and documentation 40 minutes    I very much appreciated the opportunity to see and assess Aniyah Curry in the clinic today please do not hesitate to contact my office if you have any questions or concerns.      Issac Jiménez MD  Cardiac Arrhythmia Service  [facility]  [phone]       CC  ISSAC JIMÉNEZ    "

## 2025-03-18 NOTE — NURSING NOTE
"Chief Complaint   Patient presents with    Follow Up     Return EP      BP Readings from Last 1 Encounters:   03/18/25 114/74      Pulse Readings from Last 1 Encounters:   03/18/25 77      Ht Readings from Last 2 Encounters:   02/17/25 1.6 m (5' 3\")   02/03/25 1.6 m (5' 3\")      Wt Readings from Last 2 Encounters:   03/18/25 49 kg (108 lb)   02/23/25 47.2 kg (104 lb 0.9 oz)      Body mass index is 19.13 kg/m .    Vitals were taken, medications reconciled.     Josh Alvarez, Clinic Assistant    3:26 PM    "

## 2025-03-18 NOTE — PATIENT INSTRUCTIONS
Plan:   Start taking fludrocortisone 0.1 once per day. Sent Rx to your pharmacy  Take Vitassium supplements 500mg (2-3 per day)  Follow up in 6 months      Your Care Team:  EP Cardiology   Telephone Number     Maulik Thomson RN [phone]    After business hours: [phone], ask for cardiologist on-call   Non-procedure scheduling:    Laura CALI   [phone]   Procedure scheduling:    Denise Francis   [phone]   Device Clinic (Pacemakers, ICDs, Loop Recorders)    During business hours: [phone]  After business hours:   [phone]- select option 4 and ask for job code [id number].       Cardiovascular Clinic:   [address] [place] [address]. [place] [address]      As always, thank you for trusting us with your health care needs!

## 2025-03-25 ENCOUNTER — MYC MEDICAL ADVICE (OUTPATIENT)
Dept: PEDIATRICS | Facility: CLINIC | Age: 20
End: 2025-03-25
Payer: COMMERCIAL

## 2025-03-25 ENCOUNTER — TELEPHONE (OUTPATIENT)
Dept: PEDIATRICS | Facility: CLINIC | Age: 20
End: 2025-03-25
Payer: COMMERCIAL

## 2025-03-25 NOTE — TELEPHONE ENCOUNTER
RN received call from Sahara - Transplant Coordinator at [facility]    They are waiting for patient to come in to [facility] for weight check. Goal is 110 lb. Requesting documentation of weight faxed to [phone].  Looks like patient is aware, sent mychart, can have MA/TC contact patient to set up nurse only weight if nothing else is needed.  After weight confirmation they will bring case to Committee for review.  Tentative surgery date would be 5/27/25    Sahara wants to ensure Dr Albarado is willing to care for patient after surgery  Sahara states she wants total transparency to ensure patient has provider comfortable with caring for her after surgery [place] the transplant center  After surgery,  will give patient green light to go home  We will receive AVS and Op Report upon discharge(fax number provided)  They would ask that patient be seen shortly after return home by Dr Albarado so she is aware of patient's baseline after surgery   will request labs at 3, 6, 12 months (CBC, BMP, UA with reflex to culture)      Dr Albarado, are you comfortable with continuing to care for patient after surgery? Please review and send back to RN team so we can contact Sahara with your confirmation to continue care post-op.    Sahara - Transplant Coordinator ph: [phone] (confidential VM)    Shi Rahman RN

## 2025-03-26 NOTE — TELEPHONE ENCOUNTER
Left message for Sahara to call back. When she calls back:  Advise that PCP is happy to see patient postoperative.  1) Please inquire how long patient will be in the hospital postoperative. PCP wants a hospital follow-up within 2-5 clinic days.  2) Please inquire if they would be able to order the postoperative labs for patient. [facility] Fax: [phone] or they can send patient with paper copy. Please gather a fax number that the resulted labs can be sent back to.  3) If they want PCP to order labs, please gather what labs will be needed and when. Please inquire if they want these results sent to their team.    Thanks!  Michelle ROBLES RN, BSN  Clinic RN  [facility]

## 2025-03-26 NOTE — TELEPHONE ENCOUNTER
OK. Patient knows to schedule weight check visit.  I asked patient to make sure the weight gets forwarded to RN so it can be evaluated and faxed, especially in case I am not in office.     I'm happy to see her postop. It is better if they order the labs they want so the results will go directly to their team. If not possible, I can order.     Would like to know from [place] what is typical hospital stay postoperative. Then forward to clinic staff so they can set up tentative follow up visit within 2-5 clinic days of her return home.      Maria Elena Albarado M.D.

## 2025-03-26 NOTE — TELEPHONE ENCOUNTER
Dr. Albarado- See below.     Received a call back from Sharon, transplant coordinator at [facility].     Relayed provider response below.     Sharon states,     1) Please inquire how long patient will be in the hospital postoperative. PCP wants a hospital follow-up within 2-5 clinic days. -Hospitalization is between 5-7 days, and then the pt will stay in [place] for 2-3 weeks for immediate post-op care. After post-op appt, pt will receive the green light to return home with direction to follow-up with PCP within 1 week.     2) Please inquire if they would be able to order the postoperative labs for patient. [facility] Fax: [phone] or they can send patient with paper copy. Please gather a fax number that the resulted labs can be sent back to. -The transplant team will send a letter to pt and team requesting labs for 3, 6, 12 months (CBC, BMP, UA with reflex to culture.) The results can be faxed to [phone].     -Transplant team manages pain post-op until they go home. Pain would be managed by PCP upon return home. Occasionally they will send recommendations for imaging or labs going forward.     Routed to PCP    Diamond JONAS RN   Clinic RN  [facility]

## 2025-03-31 ENCOUNTER — ALLIED HEALTH/NURSE VISIT (OUTPATIENT)
Dept: PEDIATRICS | Facility: CLINIC | Age: 20
End: 2025-03-31
Payer: COMMERCIAL

## 2025-03-31 VITALS — WEIGHT: 108.6 LBS | BODY MASS INDEX: 19.24 KG/M2

## 2025-03-31 DIAGNOSIS — Z76.89 ENCOUNTER FOR WEIGHT MANAGEMENT: Primary | ICD-10-CM

## 2025-03-31 PROCEDURE — 99207 PR NO CHARGE NURSE ONLY: CPT

## 2025-03-31 NOTE — PROGRESS NOTES
Current weight today 108 lbs 9.6 oz       Routed to RN's of the day at [facility]-  please see Dr Stefania carcamo message from 3/25/25 about faxing this

## 2025-03-31 NOTE — PROGRESS NOTES
RN contacted Sahara  Transplant coordinator at [phone].   Reviewed that current weight of 108lb 9.6oz (goal of 110)  Sahara states she is unable to bring her to committee tomorrow  Advised that patient come back to clinic on 4/11 for weight recheck.    RN contacted patient at [phone].   Relayed Sahara's recommendation for recheck  Scheduled weight recheck for 1:30 on 4/11.    If weight is 110 or above, please fax to Sahara  transplant coordinator at fax: [phone]   If under 110lb, please call Sahara at ph: [phone] to discuss next steps.    Shi Rahman RN

## 2025-04-17 ENCOUNTER — MYC MEDICAL ADVICE (OUTPATIENT)
Dept: PEDIATRICS | Facility: CLINIC | Age: 20
End: 2025-04-17
Payer: COMMERCIAL

## 2025-04-22 NOTE — TELEPHONE ENCOUNTER
Needs appointment. Please help her schedule. I'd like to do visit first and put in econsult as part of that visit so I can include more info. Could meet with Lona Albarado M.D.

## 2025-04-24 ENCOUNTER — OFFICE VISIT (OUTPATIENT)
Dept: PEDIATRICS | Facility: CLINIC | Age: 20
End: 2025-04-24
Payer: COMMERCIAL

## 2025-04-24 VITALS
OXYGEN SATURATION: 98 % | RESPIRATION RATE: 18 BRPM | HEART RATE: 74 BPM | WEIGHT: 109.2 LBS | BODY MASS INDEX: 18.64 KG/M2 | HEIGHT: 64 IN | SYSTOLIC BLOOD PRESSURE: 106 MMHG | DIASTOLIC BLOOD PRESSURE: 78 MMHG | TEMPERATURE: 98.7 F

## 2025-04-24 DIAGNOSIS — F43.23 SITUATIONAL MIXED ANXIETY AND DEPRESSIVE DISORDER: ICD-10-CM

## 2025-04-24 PROCEDURE — 1126F AMNT PAIN NOTED NONE PRSNT: CPT | Performed by: INTERNAL MEDICINE

## 2025-04-24 PROCEDURE — G2211 COMPLEX E/M VISIT ADD ON: HCPCS | Performed by: INTERNAL MEDICINE

## 2025-04-24 PROCEDURE — 3078F DIAST BP <80 MM HG: CPT | Performed by: INTERNAL MEDICINE

## 2025-04-24 PROCEDURE — 99213 OFFICE O/P EST LOW 20 MIN: CPT | Performed by: INTERNAL MEDICINE

## 2025-04-24 PROCEDURE — 3074F SYST BP LT 130 MM HG: CPT | Performed by: INTERNAL MEDICINE

## 2025-04-24 RX ORDER — ESCITALOPRAM OXALATE 5 MG/1
15 TABLET ORAL DAILY
Qty: 135 TABLET | Refills: 1 | Status: SHIPPED | OUTPATIENT
Start: 2025-04-24

## 2025-04-24 RX ORDER — HYDROXYZINE HYDROCHLORIDE 25 MG/1
25 TABLET, FILM COATED ORAL 3 TIMES DAILY PRN
Qty: 60 TABLET | Refills: 1 | Status: SHIPPED | OUTPATIENT
Start: 2025-04-24

## 2025-04-24 ASSESSMENT — ANXIETY QUESTIONNAIRES
7. FEELING AFRAID AS IF SOMETHING AWFUL MIGHT HAPPEN: MORE THAN HALF THE DAYS
GAD7 TOTAL SCORE: 18
3. WORRYING TOO MUCH ABOUT DIFFERENT THINGS: NEARLY EVERY DAY
7. FEELING AFRAID AS IF SOMETHING AWFUL MIGHT HAPPEN: MORE THAN HALF THE DAYS
GAD7 TOTAL SCORE: 18
2. NOT BEING ABLE TO STOP OR CONTROL WORRYING: NEARLY EVERY DAY
GAD7 TOTAL SCORE: 18
5. BEING SO RESTLESS THAT IT IS HARD TO SIT STILL: MORE THAN HALF THE DAYS
6. BECOMING EASILY ANNOYED OR IRRITABLE: NEARLY EVERY DAY
IF YOU CHECKED OFF ANY PROBLEMS ON THIS QUESTIONNAIRE, HOW DIFFICULT HAVE THESE PROBLEMS MADE IT FOR YOU TO DO YOUR WORK, TAKE CARE OF THINGS AT HOME, OR GET ALONG WITH OTHER PEOPLE: VERY DIFFICULT
1. FEELING NERVOUS, ANXIOUS, OR ON EDGE: NEARLY EVERY DAY
4. TROUBLE RELAXING: MORE THAN HALF THE DAYS
8. IF YOU CHECKED OFF ANY PROBLEMS, HOW DIFFICULT HAVE THESE MADE IT FOR YOU TO DO YOUR WORK, TAKE CARE OF THINGS AT HOME, OR GET ALONG WITH OTHER PEOPLE?: VERY DIFFICULT

## 2025-04-24 ASSESSMENT — ENCOUNTER SYMPTOMS
NAUSEA: 1
HEADACHES: 1
NERVOUS/ANXIOUS: 1
FATIGUE: 1

## 2025-04-24 ASSESSMENT — PAIN SCALES - GENERAL: PAINLEVEL_OUTOF10: NO PAIN (0)

## 2025-04-24 NOTE — PROGRESS NOTES
"  {PROVIDER CHARTING PREFERENCE:008355}    Katty Fermin is a 19 year old, presenting for the following health issues:  Anxiety      4/24/2025     2:43 PM   Additional Questions   Roomed by Elizabeth REID   Accompanied by N/A         4/24/2025     2:43 PM   Patient Reported Additional Medications   Patient reports taking the following new medications No     Anxiety  This is a new problem. The current episode started more than 1 month ago. The problem occurs daily. The problem has been gradually worsening. Associated symptoms include fatigue, headaches and nausea. The symptoms are aggravated by stress. She has tried rest, sleep and position changes for the symptoms. The treatment provided no relief.   History of Present Illness       Reason for visit:  Anxiety   She is taking medications regularly.        {MA/LPN/RN Pre-Provider Visit Orders- hCG/UA/Strep (Optional):494405}  {SUPERLIST (Optional):387426}  {additonal problems for provider to add (Optional):122755}    {ROS Picklists (Optional):655155}      Objective    /78 (BP Location: Right arm, Patient Position: Sitting, Cuff Size: Adult Regular)   Pulse 74   Temp 98.7  F (37.1  C) (Temporal)   Resp 18   Ht 1.619 m (5' 3.74\")   Wt 49.5 kg (109 lb 3.2 oz)   LMP 04/19/2025 (Exact Date)   SpO2 98%   BMI 18.90 kg/m    Body mass index is 18.9 kg/m .  Physical Exam   {Exam List (Optional):102609}    {Diagnostic Test Results (Optional):889655}        Signed Electronically by: Berhane Gupta MD  {Email feedback regarding this note to [email or web address]   :543637}  " "drospirenone (SLYND) 4 MG TABS tablet Take 4 mg by mouth.      Lexapro 10       fludrocortisone (FLORINEF) 0.1 MG tablet Take 1 tablet (0.1 mg) by mouth daily. 90 tablet 3           metroNIDAZOLE (METROCREAM) 0.75 % external cream Apply 1 cm topically.      nortriptyline (PAMELOR) 25 MG capsule Take 25 mg by mouth at bedtime.      ondansetron (ZOFRAN ODT) 4 MG ODT tab Take 1-2 tablets (4-8 mg) by mouth every 8 hours as needed for nausea 20 tablet 1    polyethylene glycol (MIRALAX) 17 GM/Dose powder Take 17 g by mouth.      Sulfacetamide Sodium-Sulfur 8-4 % SUSP Apply 1 cm topically.      SUMAtriptan (IMITREX) 50 MG tablet TAKE 1 TABLET BY MOUTH AT ONSET OF MIGRAINE; MAY TAKE 1 ADDITIONAL TABLET IN 2 HRS IF NEEDED. MAX DOSE  MG/DAY  MG/WEEK.*      vitamin C (ASCORBIC ACID) 500 MG tablet Take 500 mg by mouth daily.                Objective    /78 (BP Location: Right arm, Patient Position: Sitting, Cuff Size: Adult Regular)   Pulse 74   Temp 98.7  F (37.1  C) (Temporal)   Resp 18   Ht 1.619 m (5' 3.74\")   Wt 49.5 kg (109 lb 3.2 oz)   LMP 04/19/2025 (Exact Date)   SpO2 98%   BMI 18.90 kg/m    Body mass index is 18.9 kg/m .  Physical Exam   GENERAL: alert and no distress  PSYCH: mentation appears normal, affect normal            Signed Electronically by: Berhane Gupta MD    "

## 2025-04-28 ENCOUNTER — PATIENT OUTREACH (OUTPATIENT)
Dept: CARE COORDINATION | Facility: CLINIC | Age: 20
End: 2025-04-28
Payer: COMMERCIAL

## 2025-05-02 ENCOUNTER — HOSPITAL ENCOUNTER (OUTPATIENT)
Dept: MRI IMAGING | Facility: CLINIC | Age: 20
Discharge: HOME | End: 2025-05-02
Attending: NEUROLOGICAL SURGERY | Admitting: NEUROLOGICAL SURGERY
Payer: COMMERCIAL

## 2025-05-02 DIAGNOSIS — G44.52 NEW DAILY PERSISTENT HEADACHE: ICD-10-CM

## 2025-05-02 DIAGNOSIS — G93.0 INTRACRANIAL ARACHNOID CYST: ICD-10-CM

## 2025-05-02 PROCEDURE — A9585 GADOBUTROL INJECTION: HCPCS | Performed by: NEUROLOGICAL SURGERY

## 2025-05-02 PROCEDURE — 70553 MRI BRAIN STEM W/O & W/DYE: CPT

## 2025-05-02 PROCEDURE — 255N000002 HC RX 255 OP 636: Performed by: NEUROLOGICAL SURGERY

## 2025-05-02 RX ORDER — GADOBUTROL 604.72 MG/ML
5 INJECTION INTRAVENOUS ONCE
Status: COMPLETED | OUTPATIENT
Start: 2025-05-02 | End: 2025-05-02

## 2025-05-02 RX ADMIN — GADOBUTROL 5 ML: 604.72 INJECTION INTRAVENOUS at 08:06

## 2025-05-14 ENCOUNTER — MYC MEDICAL ADVICE (OUTPATIENT)
Dept: PEDIATRICS | Facility: CLINIC | Age: 20
End: 2025-05-14
Payer: COMMERCIAL

## 2025-05-29 ENCOUNTER — TELEPHONE (OUTPATIENT)
Dept: NEUROSURGERY | Facility: CLINIC | Age: 20
End: 2025-05-29
Payer: COMMERCIAL

## 2025-05-29 NOTE — TELEPHONE ENCOUNTER
Left pt a VM- Appt was changed from in person to telephone visit with Dr Willson. Pt was asked to call back if she prefers in person.

## 2025-06-03 ENCOUNTER — VIRTUAL VISIT (OUTPATIENT)
Dept: NEUROSURGERY | Facility: CLINIC | Age: 20
End: 2025-06-03
Attending: NEUROLOGICAL SURGERY
Payer: COMMERCIAL

## 2025-06-03 DIAGNOSIS — G93.0 INTRACRANIAL ARACHNOID CYST: Primary | ICD-10-CM

## 2025-06-03 NOTE — NURSING NOTE
Current patient location: [address] [place] [address] [place] [address]    Is the patient currently in the state of [place]? YES    Visit mode: VIDEO    If the visit is dropped, the patient can be reconnected by:TELEPHONE VISIT: Phone number:   Telephone Information:   Mobile [phone]       Will anyone else be joining the visit? NO  (If patient encounters technical issues they should call [phone] :[id number])    Are changes needed to the allergy or medication list? Pt stated no med changes    Are refills needed on medications prescribed by this physician? NO    Rooming Documentation:  Questionnaire(s) completed    Reason for visit: IGOR CALLAHANF

## 2025-06-03 NOTE — PROGRESS NOTES
Dear Doctor,    I spoke to [facility] by telephone today. I reviewed her MRI. This was a 1 year follow-up of an arachnoid cyst. It is unchanged. This is an incidental finding most likely and doesn't need further follow-up.    For her headaches, she should follow with her neurologist.    Of note, the MRI also noted a flow void of uncertain significance.  I will run this by our endovascular team to decide upon what further imaging or follow-up she might need for this.    Citlali Willson  
Virtual Visit Details    Type of service:  Telephone Visit   Phone call duration: 10 minutes   Originating Location (pt. Location): Home    Distant Location (provider location):  On-site  Telephone visit completed due to the patient did not have access to video, while the distant provider did.  
There are no Wet Read(s) to document.

## 2025-06-03 NOTE — LETTER
6/3/2025       RE: Aniyah Curry  [address] [place] [address]     Dear Colleague,    Thank you for referring your patient, Aniyah Curry, to the [facility]UR[facility]. Please see a copy of my visit note below.    Virtual Visit Details    Type of service:  Telephone Visit   Phone call duration: 10 minutes   Originating Location (pt. Location): Home    Distant Location (provider location):  On-site  Telephone visit completed due to the patient did not have access to video, while the distant provider did.    Dear Doctor,    I spoke to Jeny by telephone today. I reviewed her MRI. This was a 1 year follow-up of an arachnoid cyst. It is unchanged. This is an incidental finding most likely and doesn't need further follow-up.    For her headaches, she should follow with her neurologist.    Of note, the MRI also noted a flow void of uncertain significance.  I will run this by our endovascular team to decide upon what further imaging or follow-up she might need for this.    Citlali Willson    Again, thank you for allowing me to participate in the care of your patient.      Sincerely,    Citlali Willson MD

## 2025-06-04 ENCOUNTER — TELEPHONE (OUTPATIENT)
Dept: NEUROSURGERY | Facility: CLINIC | Age: 20
End: 2025-06-04

## 2025-06-04 ENCOUNTER — OFFICE VISIT (OUTPATIENT)
Dept: PEDIATRICS | Facility: CLINIC | Age: 20
End: 2025-06-04
Payer: COMMERCIAL

## 2025-06-04 VITALS
TEMPERATURE: 97.7 F | OXYGEN SATURATION: 97 % | DIASTOLIC BLOOD PRESSURE: 78 MMHG | SYSTOLIC BLOOD PRESSURE: 116 MMHG | RESPIRATION RATE: 14 BRPM | WEIGHT: 103.7 LBS | BODY MASS INDEX: 17.7 KG/M2 | HEIGHT: 64 IN | HEART RATE: 88 BPM

## 2025-06-04 DIAGNOSIS — I47.29 NSVT (NONSUSTAINED VENTRICULAR TACHYCARDIA) (H): ICD-10-CM

## 2025-06-04 DIAGNOSIS — E34.8 PINEAL GLAND CYST: ICD-10-CM

## 2025-06-04 DIAGNOSIS — I87.1 NUTCRACKER PHENOMENON OF RENAL VEIN: Primary | ICD-10-CM

## 2025-06-04 DIAGNOSIS — N92.0 MENORRHAGIA WITH REGULAR CYCLE: ICD-10-CM

## 2025-06-04 DIAGNOSIS — I67.9 CEREBRAL VASCULAR INSUFFICIENCY: Primary | ICD-10-CM

## 2025-06-04 DIAGNOSIS — I95.1 POSTURAL HYPOTENSION: ICD-10-CM

## 2025-06-04 DIAGNOSIS — Z94.0 S/P RENAL AUTOTRANSPLANT: ICD-10-CM

## 2025-06-04 PROCEDURE — 99214 OFFICE O/P EST MOD 30 MIN: CPT | Performed by: INTERNAL MEDICINE

## 2025-06-04 PROCEDURE — 3074F SYST BP LT 130 MM HG: CPT | Performed by: INTERNAL MEDICINE

## 2025-06-04 PROCEDURE — 3078F DIAST BP <80 MM HG: CPT | Performed by: INTERNAL MEDICINE

## 2025-06-04 PROCEDURE — G2211 COMPLEX E/M VISIT ADD ON: HCPCS | Performed by: INTERNAL MEDICINE

## 2025-06-04 RX ORDER — MULTIVITAMIN
1 TABLET ORAL DAILY
COMMUNITY
Start: 2025-06-04

## 2025-06-04 RX ORDER — ASPIRIN 81 MG/1
81 TABLET, CHEWABLE ORAL DAILY
COMMUNITY
Start: 2025-05-14

## 2025-06-04 NOTE — TELEPHONE ENCOUNTER
Spoke with the patient by phone to inform her that I contacted the neurovascular team on her behalf. They would like her to obtain an MRA and plan to follow up with her after the imaging is completed. Patient stated she will plan to have the MRA done at [facility] in [place]. I informed her that I would place the order today and asked that she send a [facility] message once the appointment is scheduled so we can ensure follow-up is arranged accordingly. Patient was agreeable to the plan.    MABEL Ozuna, RN  RN Coordinator Neurosurgery

## 2025-06-04 NOTE — PROGRESS NOTES
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 Notes reviewed, but she describes being told that the vascular abnormalities \"tangle\" were very significant noted at surgery.  She has had a smooth recovery and has had resolution of her pain and vomiting.   Looks great today, smiling and appears very happy.    Completed FMLA forms for her today so her mom can accompany her to upcoming appointments, or may be able to assist her if she does have any return of pain or other symptoms.    Pineal gland cyst  Having further evaluation/follow up with neurosurgery    Postural hypotension  Much improved since surgery. Hasn't been taking fludrocortisone. Now that she is better able to keep her fluids up, discussed option to work on fluid intake and salt intake. May still use fludricortisone prn.    Menorrhagia with regular cycle  Not able to restart drospirenone until 3 months postoperative. Will monitor her symptoms and she can follow with her ob/gyn.    NSVT (nonsustained ventricular tachycardia) (H)  No symptoms.      33 minutes spent by me on the date of the encounter doing chart review, history and exam, documentation and further activities per the note    The longitudinal plan of care for the diagnosis(es)/condition(s) as documented were addressed during this visit. Due to the added complexity in care, I will continue to support Jeny in the subsequent management and with ongoing continuity of care.    Follow-up  No follow-ups on file.    Subjective   Jeny is a 19 year old, presenting for the following health issues:  Follow Up      6/4/2025    12:48 PM   Additional Questions   Roomed by MALIK ZEPEDA     History of Present Illness       Reason for visit:  SMAs/Nutcracker    She eats 2-3 servings of fruits and vegetables daily.She consumes 1 " "sweetened beverage(s) daily.She exercises with enough effort to increase her heart rate 30 to 60 minutes per day.  She exercises with enough effort to increase her heart rate 4 days per week.   She is taking medications regularly.          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 She reports she was told due to complicated tangle of her vasculature, it was really good she had the surgery, renal autotransplant.    In hospital for a week. Stopped narcotics day 4, hasn't even been taking tylenol for 2-3 weeks.     Feeling much more energy, less anxiety.  Has video visit end of next month. Will have phone calls intermittent checkups for 2 years.     Will be going back to college in Fall, but at [employer], closer to home and [facility].        Review of Systems  Constitutional, HEENT, cardiovascular, pulmonary, gi and gu systems are negative, except as otherwise noted.      Objective    /78 (BP Location: Right arm, Patient Position: Sitting, Cuff Size: Adult Small)   Pulse 88   Temp 97.7  F (36.5  C) (Temporal)   Resp 14   Ht 1.619 m (5' 3.75\")   Wt 47 kg (103 lb 11.2 oz)   LMP 05/17/2025 (Exact Date)   SpO2 97%   BMI 17.94 kg/m    Body mass index is 17.94 kg/m .  Physical Exam   GENERAL: alert and no distress  NECK: no adenopathy, no asymmetry, masses, or scars  RESP: lungs clear to auscultation - no rales, rhonchi or wheezes  CV: regular rate and rhythm, normal S1 S2, no S3 or S4, no murmur, click or rub, no peripheral edema   ABDOMEN: not palpated today. Long incision midline is healing very well. No redness or drainage. 2 approx 2mm areas of scab at inferior edge.          Signed Electronically by: Maria Elena Albarado MD    "

## 2025-06-19 ENCOUNTER — ANCILLARY PROCEDURE (OUTPATIENT)
Dept: MRI IMAGING | Facility: CLINIC | Age: 20
End: 2025-06-19
Attending: NURSE PRACTITIONER
Payer: COMMERCIAL

## 2025-06-19 DIAGNOSIS — I67.9 CEREBRAL VASCULAR INSUFFICIENCY: ICD-10-CM

## 2025-06-19 PROCEDURE — 70544 MR ANGIOGRAPHY HEAD W/O DYE: CPT | Mod: TC | Performed by: RADIOLOGY

## 2025-06-25 ENCOUNTER — MYC MEDICAL ADVICE (OUTPATIENT)
Dept: PEDIATRICS | Facility: CLINIC | Age: 20
End: 2025-06-25
Payer: COMMERCIAL

## 2025-06-25 NOTE — LETTER
June 27, 2025      Aniyah Curry  [address] [place] [address]        To Whom It May Concern:    Aniyah Curry was seen in our clinic. She may return to work on or about July 7th, 2025.      Sincerely,         Maria Elena Albarado M.D.    Electronically signed

## 2025-07-08 ENCOUNTER — MYC MEDICAL ADVICE (OUTPATIENT)
Dept: PEDIATRICS | Facility: CLINIC | Age: 20
End: 2025-07-08
Payer: COMMERCIAL

## 2025-07-08 ENCOUNTER — DOCUMENTATION ONLY (OUTPATIENT)
Dept: PEDIATRICS | Facility: CLINIC | Age: 20
End: 2025-07-08
Payer: COMMERCIAL

## 2025-07-13 ENCOUNTER — HOSPITAL ENCOUNTER (OUTPATIENT)
Facility: CLINIC | Age: 20
Setting detail: OBSERVATION
Discharge: HOME | End: 2025-07-15
Attending: EMERGENCY MEDICINE | Admitting: SURGERY
Payer: COMMERCIAL

## 2025-07-13 DIAGNOSIS — R10.9 LEFT SIDED ABDOMINAL PAIN: ICD-10-CM

## 2025-07-13 DIAGNOSIS — Z94.0 STATUS POST KIDNEY AUTOTRANSPLANTATION: ICD-10-CM

## 2025-07-13 PROCEDURE — 99285 EMERGENCY DEPT VISIT HI MDM: CPT | Mod: 25 | Performed by: EMERGENCY MEDICINE

## 2025-07-13 PROCEDURE — 99285 EMERGENCY DEPT VISIT HI MDM: CPT | Performed by: EMERGENCY MEDICINE

## 2025-07-14 ENCOUNTER — APPOINTMENT (OUTPATIENT)
Dept: ULTRASOUND IMAGING | Facility: CLINIC | Age: 20
End: 2025-07-14
Attending: EMERGENCY MEDICINE
Payer: COMMERCIAL

## 2025-07-14 ENCOUNTER — APPOINTMENT (OUTPATIENT)
Dept: CT IMAGING | Facility: CLINIC | Age: 20
End: 2025-07-14
Attending: EMERGENCY MEDICINE
Payer: COMMERCIAL

## 2025-07-14 PROBLEM — R10.9 LEFT SIDED ABDOMINAL PAIN: Status: ACTIVE | Noted: 2025-07-14

## 2025-07-14 PROBLEM — Z94.0: Status: ACTIVE | Noted: 2025-07-14

## 2025-07-14 LAB
ALBUMIN SERPL BCG-MCNC: 4 G/DL (ref 3.5–5.2)
ALBUMIN UR-MCNC: 10 MG/DL
ALP SERPL-CCNC: 54 U/L (ref 40–150)
ALT SERPL W P-5'-P-CCNC: 11 U/L (ref 0–50)
ANION GAP SERPL CALCULATED.3IONS-SCNC: 12 MMOL/L (ref 7–15)
ANION GAP SERPL CALCULATED.3IONS-SCNC: 8 MMOL/L (ref 7–15)
APPEARANCE UR: CLEAR
APTT PPP: 31 SECONDS (ref 22–38)
AST SERPL W P-5'-P-CCNC: 23 U/L (ref 0–45)
BACTERIA #/AREA URNS HPF: ABNORMAL /HPF
BASOPHILS # BLD MANUAL: 0 10E3/UL (ref 0–0.2)
BASOPHILS NFR BLD MANUAL: 1 %
BILIRUB SERPL-MCNC: 0.2 MG/DL
BILIRUB UR QL STRIP: NEGATIVE
BUN SERPL-MCNC: 12.6 MG/DL (ref 6–20)
BUN SERPL-MCNC: 9 MG/DL (ref 6–20)
CALCIUM SERPL-MCNC: 8.5 MG/DL (ref 8.8–10.4)
CALCIUM SERPL-MCNC: 9 MG/DL (ref 8.8–10.4)
CHLORIDE SERPL-SCNC: 103 MMOL/L (ref 98–107)
CHLORIDE SERPL-SCNC: 107 MMOL/L (ref 98–107)
COLOR UR AUTO: ABNORMAL
CREAT SERPL-MCNC: 0.6 MG/DL (ref 0.51–0.95)
CREAT SERPL-MCNC: 0.67 MG/DL (ref 0.51–0.95)
EGFRCR SERPLBLD CKD-EPI 2021: >90 ML/MIN/1.73M2
EGFRCR SERPLBLD CKD-EPI 2021: >90 ML/MIN/1.73M2
ELLIPTOCYTES BLD QL SMEAR: SLIGHT
EOSINOPHIL # BLD MANUAL: 0.1 10E3/UL (ref 0–0.7)
EOSINOPHIL NFR BLD MANUAL: 2 %
ERYTHROCYTE [DISTWIDTH] IN BLOOD BY AUTOMATED COUNT: 12.2 % (ref 10–15)
ERYTHROCYTE [DISTWIDTH] IN BLOOD BY AUTOMATED COUNT: 12.3 % (ref 10–15)
GLUCOSE SERPL-MCNC: 100 MG/DL (ref 70–99)
GLUCOSE SERPL-MCNC: 105 MG/DL (ref 70–99)
GLUCOSE UR STRIP-MCNC: NEGATIVE MG/DL
HCG INTACT+B SERPL-ACNC: <1 MIU/ML
HCO3 SERPL-SCNC: 22 MMOL/L (ref 22–29)
HCO3 SERPL-SCNC: 23 MMOL/L (ref 22–29)
HCT VFR BLD AUTO: 33.1 % (ref 35–47)
HCT VFR BLD AUTO: 33.1 % (ref 35–47)
HGB BLD-MCNC: 10.8 G/DL (ref 11.7–15.7)
HGB BLD-MCNC: 11.1 G/DL (ref 11.7–15.7)
HGB UR QL STRIP: ABNORMAL
INR PPP: 1.02 (ref 0.85–1.15)
KETONES UR STRIP-MCNC: NEGATIVE MG/DL
LACTATE SERPL-SCNC: 0.5 MMOL/L (ref 0.7–2)
LEUKOCYTE ESTERASE UR QL STRIP: ABNORMAL
LIPASE SERPL-CCNC: 29 U/L (ref 13–60)
LYMPHOCYTES # BLD MANUAL: 1.5 10E3/UL (ref 0.8–5.3)
LYMPHOCYTES NFR BLD MANUAL: 34 %
MAGNESIUM SERPL-MCNC: 2 MG/DL (ref 1.7–2.3)
MCH RBC QN AUTO: 30.7 PG (ref 26.5–33)
MCH RBC QN AUTO: 31.4 PG (ref 26.5–33)
MCHC RBC AUTO-ENTMCNC: 32.6 G/DL (ref 31.5–36.5)
MCHC RBC AUTO-ENTMCNC: 33.5 G/DL (ref 31.5–36.5)
MCV RBC AUTO: 94 FL (ref 78–100)
MCV RBC AUTO: 94 FL (ref 78–100)
MONOCYTES # BLD MANUAL: 0.5 10E3/UL (ref 0–1.3)
MONOCYTES NFR BLD MANUAL: 11 %
MUCOUS THREADS #/AREA URNS LPF: PRESENT /LPF
NEUTROPHILS # BLD MANUAL: 2.2 10E3/UL (ref 1.6–8.3)
NEUTROPHILS NFR BLD MANUAL: 52 %
NITRATE UR QL: NEGATIVE
PH UR STRIP: 6.5 [PH] (ref 5–7)
PLASMA CELLS # BLD MANUAL: 0 10E3/UL
PLAT MORPH BLD: ABNORMAL
PLATELET # BLD AUTO: 153 10E3/UL (ref 150–450)
PLATELET # BLD AUTO: 183 10E3/UL (ref 150–450)
POTASSIUM SERPL-SCNC: 3.4 MMOL/L (ref 3.4–5.3)
POTASSIUM SERPL-SCNC: 3.5 MMOL/L (ref 3.4–5.3)
PROT SERPL-MCNC: 6.8 G/DL (ref 6.4–8.3)
PROTHROMBIN TIME: 13.4 SECONDS (ref 11.8–14.8)
RBC # BLD AUTO: 3.52 10E6/UL (ref 3.8–5.2)
RBC # BLD AUTO: 3.53 10E6/UL (ref 3.8–5.2)
RBC MORPH BLD: ABNORMAL
RBC URINE: 6 /HPF
SODIUM SERPL-SCNC: 137 MMOL/L (ref 135–145)
SODIUM SERPL-SCNC: 138 MMOL/L (ref 135–145)
SP GR UR STRIP: 1.03 (ref 1–1.03)
SQUAMOUS EPITHELIAL: 3 /HPF
TROPONIN T SERPL HS-MCNC: <6 NG/L
UROBILINOGEN UR STRIP-MCNC: NORMAL MG/DL
VARIANT LYMPHS BLD QL SMEAR: PRESENT
WBC # BLD AUTO: 4 10E3/UL (ref 4–11)
WBC # BLD AUTO: 4.3 10E3/UL (ref 4–11)
WBC URINE: 4 /HPF

## 2025-07-14 PROCEDURE — 96361 HYDRATE IV INFUSION ADD-ON: CPT

## 2025-07-14 PROCEDURE — 85027 COMPLETE CBC AUTOMATED: CPT | Performed by: EMERGENCY MEDICINE

## 2025-07-14 PROCEDURE — 250N000011 HC RX IP 250 OP 636

## 2025-07-14 PROCEDURE — 96374 THER/PROPH/DIAG INJ IV PUSH: CPT | Mod: 59

## 2025-07-14 PROCEDURE — 74177 CT ABD & PELVIS W/CONTRAST: CPT

## 2025-07-14 PROCEDURE — 250N000011 HC RX IP 250 OP 636: Performed by: EMERGENCY MEDICINE

## 2025-07-14 PROCEDURE — 81001 URINALYSIS AUTO W/SCOPE: CPT | Performed by: EMERGENCY MEDICINE

## 2025-07-14 PROCEDURE — 250N000009 HC RX 250: Performed by: EMERGENCY MEDICINE

## 2025-07-14 PROCEDURE — 250N000013 HC RX MED GY IP 250 OP 250 PS 637: Performed by: EMERGENCY MEDICINE

## 2025-07-14 PROCEDURE — 83690 ASSAY OF LIPASE: CPT | Performed by: EMERGENCY MEDICINE

## 2025-07-14 PROCEDURE — 258N000003 HC RX IP 258 OP 636

## 2025-07-14 PROCEDURE — 85007 BL SMEAR W/DIFF WBC COUNT: CPT | Performed by: EMERGENCY MEDICINE

## 2025-07-14 PROCEDURE — 84484 ASSAY OF TROPONIN QUANT: CPT | Performed by: EMERGENCY MEDICINE

## 2025-07-14 PROCEDURE — G0378 HOSPITAL OBSERVATION PER HR: HCPCS

## 2025-07-14 PROCEDURE — 76776 US EXAM K TRANSPL W/DOPPLER: CPT

## 2025-07-14 PROCEDURE — 258N000003 HC RX IP 258 OP 636: Performed by: EMERGENCY MEDICINE

## 2025-07-14 PROCEDURE — 82310 ASSAY OF CALCIUM: CPT | Performed by: EMERGENCY MEDICINE

## 2025-07-14 PROCEDURE — 36415 COLL VENOUS BLD VENIPUNCTURE: CPT | Performed by: EMERGENCY MEDICINE

## 2025-07-14 PROCEDURE — 250N000013 HC RX MED GY IP 250 OP 250 PS 637

## 2025-07-14 PROCEDURE — 83735 ASSAY OF MAGNESIUM: CPT | Performed by: EMERGENCY MEDICINE

## 2025-07-14 PROCEDURE — 85730 THROMBOPLASTIN TIME PARTIAL: CPT | Performed by: EMERGENCY MEDICINE

## 2025-07-14 PROCEDURE — 85610 PROTHROMBIN TIME: CPT | Performed by: EMERGENCY MEDICINE

## 2025-07-14 PROCEDURE — 84702 CHORIONIC GONADOTROPIN TEST: CPT | Performed by: EMERGENCY MEDICINE

## 2025-07-14 PROCEDURE — 85014 HEMATOCRIT: CPT

## 2025-07-14 PROCEDURE — 83605 ASSAY OF LACTIC ACID: CPT | Performed by: EMERGENCY MEDICINE

## 2025-07-14 PROCEDURE — 96375 TX/PRO/DX INJ NEW DRUG ADDON: CPT

## 2025-07-14 PROCEDURE — 36415 COLL VENOUS BLD VENIPUNCTURE: CPT

## 2025-07-14 PROCEDURE — 82310 ASSAY OF CALCIUM: CPT

## 2025-07-14 PROCEDURE — 96376 TX/PRO/DX INJ SAME DRUG ADON: CPT

## 2025-07-14 RX ORDER — IOPAMIDOL 755 MG/ML
68 INJECTION, SOLUTION INTRAVASCULAR ONCE
Status: COMPLETED | OUTPATIENT
Start: 2025-07-14 | End: 2025-07-14

## 2025-07-14 RX ORDER — ONDANSETRON 4 MG/1
4 TABLET, ORALLY DISINTEGRATING ORAL EVERY 6 HOURS PRN
Status: DISCONTINUED | OUTPATIENT
Start: 2025-07-14 | End: 2025-07-15 | Stop reason: HOSPADM

## 2025-07-14 RX ORDER — ONDANSETRON 2 MG/ML
4 INJECTION INTRAMUSCULAR; INTRAVENOUS EVERY 6 HOURS PRN
Status: DISCONTINUED | OUTPATIENT
Start: 2025-07-14 | End: 2025-07-15 | Stop reason: HOSPADM

## 2025-07-14 RX ORDER — LIDOCAINE 40 MG/G
CREAM TOPICAL ONCE
Status: COMPLETED | OUTPATIENT
Start: 2025-07-14 | End: 2025-07-14

## 2025-07-14 RX ORDER — MORPHINE SULFATE 4 MG/ML
4 INJECTION, SOLUTION INTRAMUSCULAR; INTRAVENOUS ONCE
Refills: 0 | Status: COMPLETED | OUTPATIENT
Start: 2025-07-14 | End: 2025-07-14

## 2025-07-14 RX ORDER — AMOXICILLIN 250 MG
2 CAPSULE ORAL 2 TIMES DAILY
Status: DISCONTINUED | OUTPATIENT
Start: 2025-07-14 | End: 2025-07-15 | Stop reason: HOSPADM

## 2025-07-14 RX ORDER — ACETAMINOPHEN 500 MG
1000 TABLET ORAL
COMMUNITY
Start: 2025-05-14

## 2025-07-14 RX ORDER — POLYETHYLENE GLYCOL 3350 17 G/17G
17 POWDER, FOR SOLUTION ORAL DAILY
Status: DISCONTINUED | OUTPATIENT
Start: 2025-07-14 | End: 2025-07-15 | Stop reason: HOSPADM

## 2025-07-14 RX ORDER — BISACODYL 10 MG
10 SUPPOSITORY, RECTAL RECTAL DAILY PRN
Status: DISCONTINUED | OUTPATIENT
Start: 2025-07-14 | End: 2025-07-15 | Stop reason: HOSPADM

## 2025-07-14 RX ORDER — AMOXICILLIN 250 MG
2 CAPSULE ORAL 2 TIMES DAILY PRN
Status: DISCONTINUED | OUTPATIENT
Start: 2025-07-14 | End: 2025-07-15 | Stop reason: HOSPADM

## 2025-07-14 RX ORDER — SODIUM CHLORIDE 9 MG/ML
INJECTION, SOLUTION INTRAVENOUS CONTINUOUS
Status: DISCONTINUED | OUTPATIENT
Start: 2025-07-14 | End: 2025-07-15 | Stop reason: HOSPADM

## 2025-07-14 RX ORDER — PROCHLORPERAZINE MALEATE 10 MG
10 TABLET ORAL EVERY 6 HOURS PRN
Status: DISCONTINUED | OUTPATIENT
Start: 2025-07-14 | End: 2025-07-15 | Stop reason: HOSPADM

## 2025-07-14 RX ORDER — OXYCODONE HYDROCHLORIDE 10 MG/1
10 TABLET ORAL ONCE
Refills: 0 | Status: COMPLETED | OUTPATIENT
Start: 2025-07-14 | End: 2025-07-14

## 2025-07-14 RX ORDER — AMOXICILLIN 250 MG
1 CAPSULE ORAL 2 TIMES DAILY PRN
Status: DISCONTINUED | OUTPATIENT
Start: 2025-07-14 | End: 2025-07-15 | Stop reason: HOSPADM

## 2025-07-14 RX ORDER — ACETAMINOPHEN 500 MG
1000 TABLET ORAL ONCE
Status: COMPLETED | OUTPATIENT
Start: 2025-07-14 | End: 2025-07-14

## 2025-07-14 RX ORDER — IBUPROFEN 200 MG
400 TABLET ORAL EVERY 4 HOURS PRN
Status: ON HOLD | COMMUNITY
End: 2025-07-15

## 2025-07-14 RX ORDER — ACETAMINOPHEN 500 MG
500 TABLET ORAL EVERY 4 HOURS PRN
Status: DISCONTINUED | OUTPATIENT
Start: 2025-07-14 | End: 2025-07-15 | Stop reason: HOSPADM

## 2025-07-14 RX ADMIN — POLYETHYLENE GLYCOL 3350 17 G: 17 POWDER, FOR SOLUTION ORAL at 09:47

## 2025-07-14 RX ADMIN — OXYCODONE HYDROCHLORIDE 10 MG: 10 TABLET ORAL at 02:35

## 2025-07-14 RX ADMIN — IOPAMIDOL 68 ML: 755 INJECTION, SOLUTION INTRAVENOUS at 01:55

## 2025-07-14 RX ADMIN — ACETAMINOPHEN 500 MG: 500 TABLET ORAL at 20:39

## 2025-07-14 RX ADMIN — ONDANSETRON 4 MG: 2 INJECTION INTRAMUSCULAR; INTRAVENOUS at 10:38

## 2025-07-14 RX ADMIN — ACETAMINOPHEN 500 MG: 500 TABLET ORAL at 10:51

## 2025-07-14 RX ADMIN — BISACODYL 10 MG: 10 SUPPOSITORY RECTAL at 15:23

## 2025-07-14 RX ADMIN — MAGNESIUM HYDROXIDE 30 ML: 400 SUSPENSION ORAL at 16:25

## 2025-07-14 RX ADMIN — SENNOSIDES AND DOCUSATE SODIUM 2 TABLET: 8.6; 5 TABLET ORAL at 20:37

## 2025-07-14 RX ADMIN — ACETAMINOPHEN 1000 MG: 500 TABLET ORAL at 00:59

## 2025-07-14 RX ADMIN — LIDOCAINE: 40 CREAM TOPICAL at 06:02

## 2025-07-14 RX ADMIN — MORPHINE SULFATE 4 MG: 4 INJECTION INTRAVENOUS at 04:25

## 2025-07-14 RX ADMIN — SENNOSIDES AND DOCUSATE SODIUM 2 TABLET: 8.6; 5 TABLET ORAL at 10:51

## 2025-07-14 RX ADMIN — SODIUM CHLORIDE, SODIUM LACTATE, POTASSIUM CHLORIDE, AND CALCIUM CHLORIDE 1000 ML: .6; .31; .03; .02 INJECTION, SOLUTION INTRAVENOUS at 00:14

## 2025-07-14 RX ADMIN — BISACODYL 10 MG: 10 SUPPOSITORY RECTAL at 18:42

## 2025-07-14 RX ADMIN — MORPHINE SULFATE 4 MG: 4 INJECTION INTRAVENOUS at 00:59

## 2025-07-14 RX ADMIN — SODIUM CHLORIDE: 0.9 INJECTION, SOLUTION INTRAVENOUS at 06:48

## 2025-07-14 ASSESSMENT — ACTIVITIES OF DAILY LIVING (ADL)
ADLS_ACUITY_SCORE: 26
ADLS_ACUITY_SCORE: 46
ADLS_ACUITY_SCORE: 26
ADLS_ACUITY_SCORE: 26
ADLS_ACUITY_SCORE: 46
ADLS_ACUITY_SCORE: 26
ADLS_ACUITY_SCORE: 46
ADLS_ACUITY_SCORE: 26
ADLS_ACUITY_SCORE: 46
ADLS_ACUITY_SCORE: 46
ADLS_ACUITY_SCORE: 30
ADLS_ACUITY_SCORE: 46
ADLS_ACUITY_SCORE: 26
ADLS_ACUITY_SCORE: 26
ADLS_ACUITY_SCORE: 46
ADLS_ACUITY_SCORE: 46
ADLS_ACUITY_SCORE: 26
ADLS_ACUITY_SCORE: 26
ADLS_ACUITY_SCORE: 46

## 2025-07-14 NOTE — ED PROVIDER NOTES
Patient was accepted at shift change signout pending CT scan.  CT showed a small amount of fluid in the abdomen/pelvis, mildly dilated collecting system.  I do not know if that explains the patient's symptoms, though given the recent autotransplant I did speak with surgery resident who did come to evaluate the patient.  She did ask for transplant ultrasound which was done, which shows somewhat similar results.  We are waiting for surgery recommendations.  Shift change pending this.  Of note, she was given oxycodone without much improvement, additional dose of morphine, and now I ordered lidocaine ointment.  Tenderness seems to be just on the left side, the site of the donor kidney.    ADDENDUM: Patient will be admitted to transplant surgery under observation status.    Dictation Disclaimer: Some of this Note has been completed with voice-recognition dictation software. Although errors are generally corrected real-time, there is the potential for a rare error to be present in the completed chart.       Nasreen Oleary MD  07/14/25 0605       Nasreen Oleary MD  07/14/25 0609

## 2025-07-14 NOTE — MEDICATION SCRIBE - ADMISSION MEDICATION HISTORY
Medication Scribe Admission Medication History    Admission medication history is complete. The information provided in this note is only as accurate as the sources available at the time of the update.    Information Source(s): Patient and [facility] via in-person    Pertinent Information: per pt+[facility], pt reported taking medications on PTA medication list as directed.      Changes made to PTA medication list:  Added: Advil 200 mg tabs, Tylenol 500 mg tabs.   Deleted: None  Changed: None    Allergies reviewed with patient and updates made in EHR: yes    Medication History Completed By: Renetta Romero 7/14/2025 7:20 AM    PTA Med List   Medication Sig Note Last Dose/Taking    acetaminophen (TYLENOL) 500 MG tablet Take 1,000 mg by mouth. Take 2 tabs by mouth every 4 hours as needed. Purpose: PAIN  More than a month    aspirin (ASA) 81 MG chewable tablet Take 81 mg by mouth daily. For 90 days post vascular surgery.  7/13/2025 Morning    azelaic acid (FINACIA) 15 % external gel Apply 1 1e15 [employer] topically.  7/13/2025 Morning    escitalopram (LEXAPRO) 5 MG tablet Take 3 tablets (15 mg) by mouth daily.  7/13/2025 Morning    fludrocortisone (FLORINEF) 0.1 MG tablet Take 1 tablet (0.1 mg) by mouth daily. (Patient taking differently: Take 0.1 mg by mouth as needed.)  More than a month    hydrOXYzine HCl (ATARAX) 25 MG tablet Take 1 tablet (25 mg) by mouth 3 times daily as needed for anxiety.  More than a month    ibuprofen (ADVIL/MOTRIN) 200 MG tablet Take 400 mg by mouth every 4 hours as needed for pain.  7/13/2025 Evening    metroNIDAZOLE (METROCREAM) 0.75 % external cream Apply 1 cm topically.  7/13/2025 Morning    ondansetron (ZOFRAN ODT) 4 MG ODT tab Take 1-2 tablets (4-8 mg) by mouth every 8 hours as needed for nausea 7/14/2025: Has not used  Taking As Needed    Sulfacetamide Sodium-Sulfur 8-4 % SUSP Apply 1 cm topically.  7/13/2025 Morning

## 2025-07-14 NOTE — ED NOTES
[facility]  Expected date:   Expected time:   Means of arrival:   Comments:  [address]

## 2025-07-14 NOTE — ED PROVIDER NOTES
"ED Provider Note  [facility]      History     Chief Complaint   Patient presents with    Abdominal Pain     HPI  Aniyah Curry is a 19 year old female with kidney autotransplant for nutcracker syndrome and May 2025 at [facility] who now presents with 3 days of left-sided upper abdominal pain.    Physical Exam   BP: 114/76  Pulse: 67  Temp: 98.3  F (36.8  C)  Resp: 20  Height: 160 cm (5' 3\")  Weight: 50.7 kg (111 lb 11.2 oz)  SpO2: 98 %  Physical Exam  Tender to palpation in abdomen  Mild pain upon jumping in place   No CVA tenderness or back pain  Midline laparotomy scar clean, dry, and intact    ED Course, Procedures, & Data      Procedures                Results for orders placed or performed during the hospital encounter of 07/13/25   Comprehensive metabolic panel   Result Value Ref Range    Sodium 137 135 - 145 mmol/L    Potassium 3.5 3.4 - 5.3 mmol/L    Carbon Dioxide (CO2) 22 22 - 29 mmol/L    Anion Gap 12 7 - 15 mmol/L    Urea Nitrogen 12.6 6.0 - 20.0 mg/dL    Creatinine 0.67 0.51 - 0.95 mg/dL    GFR Estimate >90 >60 mL/min/1.73m2    Calcium 9.0 8.8 - 10.4 mg/dL    Chloride 103 98 - 107 mmol/L    Glucose 100 (H) 70 - 99 mg/dL    Alkaline Phosphatase 54 40 - 150 U/L    AST 23 0 - 45 U/L    ALT 11 0 - 50 U/L    Protein Total 6.8 6.4 - 8.3 g/dL    Albumin 4.0 3.5 - 5.2 g/dL    Bilirubin Total 0.2 <=1.2 mg/dL   Lactic Acid Whole Blood with 1X Repeat in 2 HR when >2   Result Value Ref Range    Lactic Acid, Initial 0.5 (L) 0.7 - 2.0 mmol/L   UA with Microscopic reflex to Culture    Specimen: Urine, Clean Catch   Result Value Ref Range    Color Urine Light Yellow Colorless, Straw, Light Yellow, Yellow    Appearance Urine Clear Clear    Glucose Urine Negative Negative mg/dL    Bilirubin Urine Negative Negative    Ketones Urine Negative Negative mg/dL    Specific Gravity Urine 1.030 1.003 - 1.035    Blood Urine Small (A) Negative    pH Urine 6.5 5.0 - 7.0    Protein " Albumin Urine 10 (A) Negative mg/dL    Urobilinogen Urine Normal Normal mg/dL    Nitrite Urine Negative Negative    Leukocyte Esterase Urine Small (A) Negative    Bacteria Urine Few (A) None Seen /HPF    Mucus Urine Present (A) None Seen /LPF    RBC Urine 6 (H) <=2 /HPF    WBC Urine 4 <=5 /HPF    Squamous Epithelials Urine 3 (H) <=1 /HPF   INR   Result Value Ref Range    INR 1.02 0.85 - 1.15    PT 13.4 11.8 - 14.8 Seconds   Partial thromboplastin time   Result Value Ref Range    aPTT 31 22 - 38 Seconds   Result Value Ref Range    Lipase 29 13 - 60 U/L   Result Value Ref Range    Troponin T, High Sensitivity <6 <=14 ng/L   Result Value Ref Range    Magnesium 2.0 1.7 - 2.3 mg/dL   HCG quantitative pregnancy   Result Value Ref Range    hCG Quantitative <1 <5 mIU/mL   CBC with platelets and differential   Result Value Ref Range    WBC Count 4.3 4.0 - 11.0 10e3/uL    RBC Count 3.53 (L) 3.80 - 5.20 10e6/uL    Hemoglobin 11.1 (L) 11.7 - 15.7 g/dL    Hematocrit 33.1 (L) 35.0 - 47.0 %    MCV 94 78 - 100 fL    MCH 31.4 26.5 - 33.0 pg    MCHC 33.5 31.5 - 36.5 g/dL    RDW 12.2 10.0 - 15.0 %    Platelet Count 183 150 - 450 10e3/uL     Medications   lactated ringers BOLUS 1,000 mL (0 mLs Intravenous Stopped 7/14/25 0117)   morphine (PF) injection 4 mg (4 mg Intravenous $Given 7/14/25 0059)   acetaminophen (TYLENOL) tablet 1,000 mg (1,000 mg Oral $Given 7/14/25 0059)          Critical care was not performed.     Medical Decision Making  The patient's presentation was of high complexity (a chronic illness severe exacerbation, progression, or side effect of treatment).    The patient's evaluation involved:  ordering and/or review of 2 test(s) in this encounter (see separate area of note for details)    The patient's management necessitated high risk (a decision regarding hospitalization).    Assessment & Plan    Will rule out infection such as abscess, intra-abdominal SBO, or internal bleeding. Plan for CT abdomen/pelvis,  urinalysis, labs including lactate, pregnancy test, IV morphine, Tylenol, and IV fluids. Will reassess after initial workup.    130a -labs show negative lactate and normal WBC count.  Pregnancy test negative.  Pending CT abdomen pelvis after IV fluids, pain medication.  Signed out to overnight attending    I have reviewed the nursing notes. I have reviewed the findings, diagnosis, plan and need for follow up with the patient.    New Prescriptions    No medications on file       Final diagnoses:   Left sided abdominal pain   Status post kidney autotransplantation       Tico Quinteros MD  [facility] EMERGENCY DEPARTMENT  7/13/2025     Tico Quinteros MD  07/14/25 0137

## 2025-07-14 NOTE — ED TRIAGE NOTES
Pt ambulatory to triage with her mom, with c/o Left lower abdominal pain x 3days, swelling on upper abdomen x 1week. Denies fever, n/v/d. Pt has hx of Kidney transplant 5/2025.

## 2025-07-14 NOTE — CONSULTS
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 Initial pain was in LUQ and denies UTI symptoms. States that she does have pain in RLQ after transplant ultrasound. 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     States that she suddenly had abdominal pain in her LUQ. Denies current nausea/vomiting. States that nothing makes the pain better or worse. States that she no does have pain in the RLQ after transplant ultrasound/     Past Medical History:  Past Medical History:   Diagnosis Date    RetroCerebellar arachnoid cyst     Seasonal allergic rhinitis        Past Surgical History  Past Surgical History:   Procedure Laterality Date    APPENDECTOMY      EP STUDY TILT TABLE N/A 2024    Procedure: Tilt Table Study;  Surgeon: Issac Jiménez MD;  Location:  [facility]    ESOPHAGOSCOPY, GASTROSCOPY, DUODENOSCOPY (EGD), COMBINED N/A 2024    Procedure: Esophagoscopy, gastroscopy, duodenoscopy (EGD), combined with biopsies with biopsy forceps;  Surgeon: Berhane Eastman MD;  Location: [facility]    Labral tear repair Left 2022       Family History:  Family History   Problem Relation Age of Onset    Hypothyroidism Mother     Unknown/Adopted Mother     Anesthesia Reaction Mother     Thyroid Disease Mother         Hashimoto's    Hypothyroidism Father     Thyroid Disease Father         Hypothyroidism and vitiligo    Hypothyroidism Maternal Grandmother      Thyroid Disease Maternal Grandmother         Hypothyroidism    Cerebrovascular Disease Maternal Grandfather         2014    Prostate Cancer Maternal Grandfather     Hypothyroidism Paternal Grandmother     Thyroid Disease Paternal Grandmother         Hyposthyroidism    Coronary Artery Disease Paternal Grandfather     Hypertension Paternal Grandfather     Anxiety Disorder Paternal Grandfather     Gastrointestinal Disease Brother 5        celiac disease    Hypothyroidism Brother     Asthma Brother     Genetic Disorder Brother         Celiac Disease    Thyroid Disease Brother         Autoimmune Hypothyroidism    Genetic Disorder Cousin         JRA    Breast Cancer Other         Aunt    Colon Cancer No family hx of        Social History:  Social History     Socioeconomic History    Marital status: Single     Spouse name: Not on file    Number of children: Not on file    Years of education: Not on file    Highest education level: Not on file   Occupational History    Not on file   Tobacco Use    Smoking status: Never     Passive exposure: Never    Smokeless tobacco: Never   Vaping Use    Vaping status: Never Used   Substance and Sexual Activity    Alcohol use: No    Drug use: No    Sexual activity: Not Currently     Partners: Male     Birth control/protection: Pill   Other Topics Concern    Parent/sibling w/ CABG, MI or angioplasty before 65F 55M? No   Social History Narrative    Not on file     Social Drivers of Health     Financial Resource Strain: Low Risk  (2/25/2025)    Financial Resource Strain     Within the past 12 months, have you or your family members you live with been unable to get utilities (heat, electricity) when it was really needed?: No   Food Insecurity: No Food Insecurity (5/8/2025)    Received from [facility] [place] [facility] [place]    Hunger Vital Sign     Worried About Running Out of Food in the Last Year: Never true     Ran Out of Food in the Last Year: Never true   Transportation  Needs: No Transportation Needs (5/8/2025)    Received from [facility] [place]    PRAPARE - Transportation     Lack of Transportation (Medical): No     Lack of Transportation (Non-Medical): No   Physical Activity: Sufficiently Active (2/25/2025)    Exercise Vital Sign     Days of Exercise per Week: 7 days     Minutes of Exercise per Session: 60 min   Stress: Stress Concern Present (5/8/2025)    Received from [facility] [place]    [place] [employer] - Occupational Stress Questionnaire     Feeling of Stress : To some extent   Social Connections: Unknown (2/25/2025)    Social Connection and Isolation Panel [NHANES]     Frequency of Communication with Friends and Family: Not on file     Frequency of Social Gatherings with Friends and Family: More than three times a week     Attends Jewish Services: Not on file     Active Member of Clubs or Organizations: Not on file     Attends Club or Organization Meetings: Not on file     Marital Status: Not on file   Interpersonal Safety: Not At Risk (5/8/2025)    Received from [facility]    Humiliation, Afraid, Rape, and Kick questionnaire     Fear of Current or Ex-Partner: No     Emotionally Abused: No     Physically Abused: No     Sexually Abused: No   Housing Stability: Low Risk  (5/8/2025)    Received from [facility]    Housing Stability Vital Sign     Unable to Pay for Housing in the Last Year: No     Number of Times Moved in the Last Year: 0     Homeless in the Last Year: No       Medications:  Current Outpatient Medications   Medication Sig Dispense Refill    aspirin (ASA) 81 MG chewable tablet Take 81 mg by mouth daily. For 90 days post vascular surgery.      azelaic acid (FINACIA) 15 % external gel Apply 1 1e15 Vector Genomes topically.      drospirenone (SLYND) 4 MG TABS tablet Take 4 mg by mouth. (Patient not  "taking: Reported on 6/4/2025)      escitalopram (LEXAPRO) 5 MG tablet Take 3 tablets (15 mg) by mouth daily. 135 tablet 1    fludrocortisone (FLORINEF) 0.1 MG tablet Take 1 tablet (0.1 mg) by mouth daily. 90 tablet 3    hydrOXYzine HCl (ATARAX) 25 MG tablet Take 1 tablet (25 mg) by mouth 3 times daily as needed for anxiety. 60 tablet 1    metroNIDAZOLE (METROCREAM) 0.75 % external cream Apply 1 cm topically.      multivitamin w/minerals (MULTI-VITAMIN) tablet Take 1 tablet by mouth daily.      ondansetron (ZOFRAN ODT) 4 MG ODT tab Take 1-2 tablets (4-8 mg) by mouth every 8 hours as needed for nausea 20 tablet 1    Sulfacetamide Sodium-Sulfur 8-4 % SUSP Apply 1 cm topically.      vitamin C (ASCORBIC ACID) 500 MG tablet Take 500 mg by mouth daily.         Allergies:     Allergies   Allergen Reactions    No Known Drug Allergy        Review of Symptoms:  A 10 point review of symptoms has been conducted and is negative except for that mentioned in the above HPI.    Physical Exam:    Blood pressure 114/76, pulse 67, temperature 98.3  F (36.8  C), temperature source Oral, resp. rate 20, height 1.6 m (5' 3\"), weight 50.7 kg (111 lb 11.2 oz), last menstrual period 05/17/2025, SpO2 98%, not currently breastfeeding.  Gen:    Lying in bed in NAD, A&OX3   HEENT: Normocephalic and atraumatic  CV:  RRR  Pulm:  Non-labored breathing  Abd:  Soft, tenderness to palpation of LUQ and RLQ, non-distended  Ext:  Warm and well perfused, no obvious deformities    Labs:  CBC RESULTS:   Recent Labs   Lab Test 07/14/25  0010   WBC 4.3   RBC 3.53*   HGB 11.1*   HCT 33.1*   MCV 94   MCH 31.4   MCHC 33.5   RDW 12.2        BMP RESULTS:   Recent Labs   Lab 07/14/25  0010      POTASSIUM 3.5   CHLORIDE 103   CO2 22   BUN 12.6   CR 0.67   *     LFT RESULTS:   Recent Labs   Lab 07/14/25  0010   AST 23   ALT 11   ALKPHOS 54   BILITOTAL 0.2   ALBUMIN 4.0   INR 1.02       Imaging:  Results for orders placed or performed during the " hospital encounter of 07/13/25   CT Abdomen Pelvis w Contrast    Impression    IMPRESSION:   1.  No bowel obstruction.  2.   Status post renal autotransplant. Trace amount of peritransplant fluid and small amount of pelvic fluid. Slight fullness transplant collecting system.  3.  Vascular findings of  splenic and hepatic artery narrowing by median arcuate ligament demonstrated on prior CTA.     US Renal Transplant with Doppler    Impression    IMPRESSION:   1. Auto renal transplant right iliac fossa. Slight prominence of the collecting system again seen without change post void.  2. Trace amount of fluid adjacent to the transplant.  3. Borderline lower segmental RI. Mid and upper segmental resistive indices within normal limits.  4. Debris within the bladder.

## 2025-07-14 NOTE — PLAN OF CARE
Patient has had 2 small bowel movements this shift. Transplant team paged in regards of progress. No response. Patient would like to be discharged tonight. Patient continues to have left sided abdominal pain without relief from bowel movements. Milk of magnesia and Dulcolx given. No pain medications given or requested.     Pt A/Ox4, PIV @ 75 normal saline. Regular diet. Neuro's intact. Mother at bedside. Supportive. Pending discharge tonight after BM.

## 2025-07-14 NOTE — PROGRESS NOTES
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 Her abdominal exam is relatively benign. Her UOP, creatinine, renal U/S, and UA are not concerning for any renal/urinary/transplant etiology. She is not currently menstruating. On CT, she does have significant stool burden which clinically correlates with the diffuse abdominal pain she is endorsing making constipation the most likely ddx. We've ordered PO bowel regimen as well as a suppository and will continue to monitor her throughout the day with hopes of discharge to home later today.     Graft function:  Kidney: Cr .0.60 (at her baseline) UOP adequate. Post-op US shows slight prominence of the collecting duct system and trace amounts of fluid adjacent to the transplanted kidney, otherwise unremarkable.    Cardiorespiratory: Stable. No interventions indicated.     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 She presented to the ED after new onset of LUQ and lower abdominal pain. She would rate this pain as a 7/10 and states she has never had pain of this nature following the kidney transplant. It is different than nutcracker syndrome pain she experienced previously. Subjectively, she reports abdominal swelling, particularly in her LUQ. She denies any UTI like symptoms, constipation (states she is having BMs approximately  "3x/day), N/V/D. Denies any sick contacts  or unusual foods recently.     ROS:   A 10-point review of systems was negative except as noted above.    Meds:  Current Facility-Administered Medications   Medication Dose Route Frequency Provider Last Rate Last Admin       Physical Exam:     Admit Weight: 50.7 kg (111 lb 11.2 oz)    Current vitals:   /66 (BP Location: Right arm)   Pulse 67   Temp 98  F (36.7  C) (Oral)   Resp 16   Ht 1.6 m (5' 3\")   Wt 50.7 kg (111 lb 11.2 oz)   LMP 05/17/2025 (Exact Date)   SpO2 100%   BMI 19.79 kg/m      Vital sign ranges:    Temp:  [97.4  F (36.3  C)-98.3  F (36.8  C)] 98  F (36.7  C)  Pulse:  [67] 67  Resp:  [16-20] 16  BP: (114-117)/(66-83) 115/66  SpO2:  [98 %-100 %] 100 %    General Appearance: in no apparent distress.   Skin: Warm, perfused  Heart: Regular rate and rhythm   Lungs: Non labored breathing  Abdomen: The abdomen is soft, non-distended, the patient endorses mild tenderness to palpation, no guarding. Well healed midline laparotomy scar.   Neurologic: awake, alert, and oriented.     Data:   CMP  Recent Labs   Lab 07/14/25  0645 07/14/25  0010    137   POTASSIUM 3.4 3.5   CHLORIDE 107 103   CO2 23 22   * 100*   BUN 9.0 12.6   CR 0.60 0.67   GFRESTIMATED >90 >90   CHRISSIE 8.5* 9.0   MAG  --  2.0   LIPASE  --  29   ALBUMIN  --  4.0   BILITOTAL  --  0.2   ALKPHOS  --  54   AST  --  23   ALT  --  11     CBC  Recent Labs   Lab 07/14/25  0645 07/14/25  0010   HGB 10.8* 11.1*   WBC 4.0 4.3    183       "

## 2025-07-14 NOTE — PLAN OF CARE
"Goal Outcome Evaluation:  -diagnostic tests and consults completed and resulted: Not yet    -vital signs normal or at patient baseline : yes, /60 (BP Location: Right arm)   Pulse 65   Temp 97.7  F (36.5  C) (Oral)   Resp 16   Ht 1.6 m (5' 3\")   Wt 50.7 kg (111 lb 11.2 oz)   LMP 05/17/2025 (Exact Date)   SpO2 99%   BMI 19.79 kg/m      -adequate pain control on oral analgesics: Progressing    -safe disposition plan has been identified : pending  Pt admitted for abd pain.Zofran given x 1 for nausea, no emesis. Her CT shows significant stool burden. Miralax and Senna administered, No BM yet. Voided x 1. Bisacodyl supp offered and Pt declined. Will re attempt. Family here and supportive, Lunch ordered.  Nurse to notify provider when observation goals have been met and patient is ready for discharge.   "

## 2025-07-15 VITALS
OXYGEN SATURATION: 98 % | BODY MASS INDEX: 19.79 KG/M2 | RESPIRATION RATE: 18 BRPM | WEIGHT: 111.7 LBS | TEMPERATURE: 97.7 F | SYSTOLIC BLOOD PRESSURE: 116 MMHG | DIASTOLIC BLOOD PRESSURE: 73 MMHG | HEIGHT: 63 IN | HEART RATE: 72 BPM

## 2025-07-15 PROCEDURE — G0378 HOSPITAL OBSERVATION PER HR: HCPCS

## 2025-07-15 PROCEDURE — 96361 HYDRATE IV INFUSION ADD-ON: CPT

## 2025-07-15 PROCEDURE — 250N000013 HC RX MED GY IP 250 OP 250 PS 637

## 2025-07-15 RX ORDER — AMOXICILLIN 250 MG
1 CAPSULE ORAL 2 TIMES DAILY PRN
COMMUNITY
Start: 2025-07-15

## 2025-07-15 RX ORDER — POLYETHYLENE GLYCOL 3350 17 G/17G
17 POWDER, FOR SOLUTION ORAL DAILY
COMMUNITY
Start: 2025-07-15

## 2025-07-15 RX ORDER — AMOXICILLIN 250 MG
1-2 CAPSULE ORAL 2 TIMES DAILY PRN
COMMUNITY
Start: 2025-07-15

## 2025-07-15 RX ADMIN — POLYETHYLENE GLYCOL 3350 17 G: 17 POWDER, FOR SOLUTION ORAL at 09:19

## 2025-07-15 RX ADMIN — SENNOSIDES AND DOCUSATE SODIUM 2 TABLET: 8.6; 5 TABLET ORAL at 09:19

## 2025-07-15 ASSESSMENT — ACTIVITIES OF DAILY LIVING (ADL)
ADLS_ACUITY_SCORE: 30

## 2025-07-15 NOTE — PROVIDER NOTIFICATION
Text paged Transplant Kidney Resident  1A OBS, Rm 506, G.G.  Lg BM early morning 5am? denies pain since. IVF completed & SL. Tolerating Regular diet, no nausea. Pt is ready to discharge home, would like work note d/t hospitalization. Thanks, Cuca Ortega, RN     Response: They will be rounding shortly and get discharge orders placed.

## 2025-07-15 NOTE — PLAN OF CARE
Adrián from [facility] [[id number]] returned paged. Provider requesting patient to have a larger bowel movement since pain is the same. Patient okay with discharging tomorrow. Patient and mother updated.

## 2025-07-15 NOTE — PLAN OF CARE
Goal Outcome Evaluation:      Plan of Care Reviewed With: patient, parent    Overall Patient Progress: improvingOverall Patient Progress: improving    Outcome Evaluation: having small formed stools

## 2025-07-15 NOTE — PLAN OF CARE
"Goal Outcome Evaluation:  PRIMARY DIAGNOSIS: ACUTE PAIN - LLQ Abdominal pain  OUTPATIENT/OBSERVATION GOALS TO BE MET BEFORE DISCHARGE:  1. Pain Status: Pain free.    2. Return to near baseline physical activity: Yes    3. Cleared for discharge by consultants (if involved): No    Discharge Planner Nurse   Safe discharge environment identified: Yes  Barriers to discharge: No - Patient would like work note d/t hospitalization       Entered by: Dipti Ortega RN 07/15/2025 9:29 AM    Please review provider order for any additional goals.     -diagnostic tests and consults completed and resulted - Met  -vital signs normal or at patient baseline - Met  BP 99/60 (BP Location: Right arm)   Pulse 77   Temp 98.1  F (36.7  C) (Oral)   Resp 16   Ht 1.6 m (5' 3\")   Wt 50.7 kg (111 lb 11.2 oz)   LMP 05/17/2025 (Exact Date)   SpO2 99%   BMI 19.79 kg/m    -adequate pain control on oral analgesics - Met, pain free since BM at 4-5am  -safe disposition plan has been identified - Met    Nurse to notify provider when observation goals have been met and patient is ready for discharge   "

## 2025-07-15 NOTE — PROGRESS NOTES
"BP 99/60 (BP Location: Right arm)   Pulse 75   Temp 98  F (36.7  C) (Oral)   Resp 16   Ht 1.6 m (5' 3\")   Wt 50.7 kg (111 lb 11.2 oz)   LMP 05/17/2025 (Exact Date)   SpO2 99%   BMI 19.79 kg/m      Activity: Up ad vale  Neuros:  A&O x4.   Cardiac:  WDL.   Respiratory: WDL on RA. Denies SOB.  GI/:  Voiding spontaneously. +BS, passing flatus, small formed stool x1 this shift  Diet: Regular  Skin: No issues  Lines: PIV x1 SL  Incisions/Drains: None  Labs: Reviewed  Pain/nausea: Denies n/v, abdominal pain   New changes this shift: None  Plan:  Discharged today      "

## 2025-07-15 NOTE — PLAN OF CARE
Goal Outcome Evaluation:      Plan of Care Reviewed With: patient, parent    Overall Patient Progress: improvingOverall Patient Progress: improving    Outcome Evaluation: One Large BM this morning, pain improved

## 2025-07-15 NOTE — DISCHARGE SUMMARY
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 Her abdominal exam is relatively benign. Her UOP, creatinine, renal U/S, and UA are not concerning for any renal/urinary/transplant etiology. She is not currently menstruating. On CT, she does have significant stool burden which clinically correlates with the diffuse abdominal pain she is endorsing making constipation the most likely diagnosis.      Hospital Course   Aniyah Curry was admitted on 7/13/2025.  The following problems were addressed during her hospitalization:    Graft function:  Kidney: Cr .0.60 (at her baseline) UOP adequate. Post-op US shows slight prominence of the collecting duct system and trace amounts of fluid adjacent to the transplanted kidney, otherwise unremarkable.     Cardiorespiratory: Stable. No interventions indicated.      GI/Nutrition:   Diet: Regular  Bowel regimen: Senna, PEG, milk of magnesia, and bisacodyl suppository given.   Abdominal discomfort: Improved with +bowel movements.    Vidhya Vee PA-C    Discharge Disposition   Discharged to home   Condition at discharge: Stable    Primary Care Physician   Maria Elena Albarado    Physical Exam   Temp: 97.7  F (36.5  C) Temp src: Oral BP: 116/73 Pulse: 72   Resp: 18 SpO2: 98 % O2 Device: None (Room air)    Vitals:    07/13/25 2344   Weight: 50.7 kg (111 lb 11.2 oz)     Vital Signs with Ranges  Temp:  [97.7  F (36.5  C)-98.1  F (36.7  C)] 97.7  F (36.5  C)  Pulse:  [62-83] 72  Resp:   [16-18] 18  BP: ()/(57-73) 116/73  Cuff Mean (mmHg):  [70] 70  SpO2:  [98 %-99 %] 98 %  I/O last 3 completed shifts:  In: 480 [P.O.:480]  Out: -   General Appearance: in no apparent distress.   Skin: Warm, perfused  Heart: Regular rate and rhythm   Lungs: Non labored breathing  Abdomen: The abdomen is soft, non-distended, the patient endorses mild tenderness to palpation, no guarding. Well healed midline laparotomy scar.   Neurologic: awake, alert, and oriented.     Consultations This Hospital Stay   TRANSPLANT SURGERY KIDNEY ADULT IP CONSULT    Time Spent on this Encounter   I have spent greater than 30 minutes on this discharge.    Discharge Orders   Discharge Medications   Current Discharge Medication List        START taking these medications    Details   magnesium hydroxide (MILK OF MAGNESIA) 400 MG/5ML suspension Take 30 mLs by mouth daily as needed for constipation.    Associated Diagnoses: Status post kidney autotransplantation      polyethylene glycol (MIRALAX) 17 GM/Dose powder Take 17 g by mouth daily.    Associated Diagnoses: Status post kidney autotransplantation      !! senna-docusate (SENOKOT-S/PERICOLACE) 8.6-50 MG tablet Take 1-2 tablets by mouth 2 times daily as needed for constipation.    Associated Diagnoses: Status post kidney autotransplantation      !! senna-docusate (SENOKOT-S/PERICOLACE) 8.6-50 MG tablet Take 1 tablet by mouth 2 times daily as needed for constipation.    Associated Diagnoses: Status post kidney autotransplantation       !! - Potential duplicate medications found. Please discuss with provider.        CONTINUE these medications which have NOT CHANGED    Details   acetaminophen (TYLENOL) 500 MG tablet Take 1,000 mg by mouth. Take 2 tabs by mouth every 4 hours as needed. Purpose: PAIN      aspirin (ASA) 81 MG chewable tablet Take 81 mg by mouth daily. For 90 days post vascular surgery.      azelaic acid (FINACIA) 15 % external gel Apply 1 1e15 Vector Genomes topically.       escitalopram (LEXAPRO) 5 MG tablet Take 3 tablets (15 mg) by mouth daily.  Qty: 135 tablet, Refills: 1    Comments: Dose change, does not need refill  Associated Diagnoses: Situational mixed anxiety and depressive disorder      drospirenone (SLYND) 4 MG TABS tablet Take 4 mg by mouth.           STOP taking these medications       fludrocortisone (FLORINEF) 0.1 MG tablet Comments:   Reason for Stopping:         hydrOXYzine HCl (ATARAX) 25 MG tablet Comments:   Reason for Stopping:         ibuprofen (ADVIL/MOTRIN) 200 MG tablet Comments:   Reason for Stopping:         metroNIDAZOLE (METROCREAM) 0.75 % external cream Comments:   Reason for Stopping:         multivitamin w/minerals (MULTI-VITAMIN) tablet Comments:   Reason for Stopping:         ondansetron (ZOFRAN ODT) 4 MG ODT tab Comments:   Reason for Stopping:         Sulfacetamide Sodium-Sulfur 8-4 % SUSP Comments:   Reason for Stopping:         vitamin C (ASCORBIC ACID) 500 MG tablet Comments:   Reason for Stopping:                  Reason for your hospital stay    Constipation     Activity    Your activity upon discharge: Resume previous activities, increase as tolerated. Exercise daily     Diet    Follow this diet upon discharge: Keep yourself well hydrated (goal 1500 ml/day).   Avoid constipating foods.  If you are constipated, take a stool softener like Senna, Dulcolax, Miralax, or a Fleets enema (available over the counter).     Hospital Follow-up with Existing Primary Care Provider (PCP)              Data   Most Recent 3 CBC's:  Recent Labs   Lab Test 07/14/25  0645 07/14/25  0010 02/23/25  2200   WBC 4.0 4.3 5.9   HGB 10.8* 11.1* 12.2   MCV 94 94 93    183 191      Most Recent 3 BMP's:  Recent Labs   Lab Test 07/14/25  0645 07/14/25  0010 02/23/25  2149    137 137   POTASSIUM 3.4 3.5 3.8   CHLORIDE 107 103 101   CO2 23 22 25   BUN 9.0 12.6 13.4   CR 0.60 0.67 0.74   ANIONGAP 8 12 11   CHRISSIE 8.5* 9.0 9.3   * 100* 88     Most Recent 2  "LFT's:  Recent Labs   Lab Test 07/14/25  0010 02/23/25  2149   AST 23 21   ALT 11 12   ALKPHOS 54 50   BILITOTAL 0.2 0.2     Most Recent INR's and Anticoagulation Dosing History:  Anticoagulation Dose History          Latest Ref Rng & Units 7/14/2025   Recent Dosing and Labs   INR 0.85 - 1.15 1.02      Most Recent 3 Troponin's:No lab results found.  Most Recent Cholesterol Panel:No lab results found.  Most Recent 6 Bacteria Isolates From Any Culture (See EPIC Reports for Culture Details):No lab results found.  Most Recent TSH, T4 and A1c Labs:  Recent Labs   Lab Test 01/13/25 2033 01/04/24  1016 07/27/23  1135   TSH 0.90   < > 3.56   T4  --   --  1.67*    < > = values in this interval not displayed.       Lab Results   Component Value Date    LIPASE 29 07/14/2025    LIPASE 41 02/23/2025    LIPASE 24 01/13/2025    LIPASE 23 06/09/2024    LIPASE 28 05/06/2024     No results found for: \"AMYLASE\"     "

## 2025-07-15 NOTE — PROGRESS NOTES
Patient discharged to home, self care. Discharged instructions given and patient verbalized understanding. Patient transported to main entrance via wheelchair and picked up by personal transportation. Patient has all belongings.

## 2025-07-16 ENCOUNTER — PATIENT OUTREACH (OUTPATIENT)
Dept: CARE COORDINATION | Facility: CLINIC | Age: 20
End: 2025-07-16
Payer: COMMERCIAL

## 2025-07-16 NOTE — PROGRESS NOTES
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 (Red Flag symptoms escalate to triage hotline per guidelines): Improved  Do you know how to contact your clinic care team if you have future questions or changes to your health status? : Yes  Does the patient have their discharge instructions? : Yes  Does the patient have questions regarding their discharge instructions? : No  Were you started on any new medications or were there changes to any of your previous medications? : Yes  Does the patient have all of their medications?: Yes  Do you have questions regarding any of your medications? : No  Do you have all of your needed medical supplies or equipment (DME)?  (i.e. oxygen tank, CPAP, cane, etc.): Yes    Post-op ([facility] Only)  If the patient had a surgery or procedure, do they have any questions for a nurse?: No         [facility] Explained and offered Care Coordination support to eligible patients: Yes    Patient accepted? No    Follow up Plan     Discharge Follow-Up  Discharge follow up appointment scheduled in alignment with recommended follow up timeframe or Transitions of Risk Category? (Low = within 30 days; Moderate= within 14 days; High= within 7 days): Yes  Discharge Follow Up Appointment Date: 07/23/25  Discharge Follow Up Appointment Scheduled with?: Specialty Care Provider (SOT appt.)    Future Appointments   Date Time Provider Department Center "   1/5/2026  9:40 AM Maria Elena Albarado MD [facility]       Outpatient Plan as outlined on AVS reviewed with patient.    For any urgent concerns, please contact our 24 hour nurse triage line: [phone] ([phone])       GEORGINA Rios  [phone]  [facility]

## 2025-07-23 ENCOUNTER — TELEPHONE (OUTPATIENT)
Dept: PEDIATRICS | Facility: CLINIC | Age: 20
End: 2025-07-23
Payer: COMMERCIAL

## 2025-07-23 ENCOUNTER — MEDICAL CORRESPONDENCE (OUTPATIENT)
Dept: HEALTH INFORMATION MANAGEMENT | Facility: CLINIC | Age: 20
End: 2025-07-23
Payer: COMMERCIAL

## 2025-07-23 DIAGNOSIS — D64.9 ANEMIA, UNSPECIFIED TYPE: Primary | ICD-10-CM

## 2025-07-31 ENCOUNTER — LAB (OUTPATIENT)
Dept: LAB | Facility: CLINIC | Age: 20
End: 2025-07-31
Payer: COMMERCIAL

## 2025-07-31 DIAGNOSIS — D64.9 ANEMIA, UNSPECIFIED TYPE: ICD-10-CM

## 2025-07-31 DIAGNOSIS — Z11.3 SCREENING FOR STDS (SEXUALLY TRANSMITTED DISEASES): ICD-10-CM

## 2025-07-31 LAB
ERYTHROCYTE [DISTWIDTH] IN BLOOD BY AUTOMATED COUNT: 12.3 % (ref 10–15)
HCT VFR BLD AUTO: 36.3 % (ref 35–47)
HGB BLD-MCNC: 12 G/DL (ref 11.7–15.7)
MCH RBC QN AUTO: 31.1 PG (ref 26.5–33)
MCHC RBC AUTO-ENTMCNC: 33.1 G/DL (ref 31.5–36.5)
MCV RBC AUTO: 94 FL (ref 78–100)
PLATELET # BLD AUTO: 220 10E3/UL (ref 150–450)
RBC # BLD AUTO: 3.86 10E6/UL (ref 3.8–5.2)
WBC # BLD AUTO: 5.1 10E3/UL (ref 4–11)

## 2025-08-11 ENCOUNTER — MYC REFILL (OUTPATIENT)
Dept: PEDIATRICS | Facility: CLINIC | Age: 20
End: 2025-08-11
Payer: COMMERCIAL

## 2025-08-11 DIAGNOSIS — F43.23 SITUATIONAL MIXED ANXIETY AND DEPRESSIVE DISORDER: ICD-10-CM

## 2025-08-12 RX ORDER — ESCITALOPRAM OXALATE 5 MG/1
15 TABLET ORAL DAILY
Qty: 135 TABLET | Refills: 1 | Status: SHIPPED | OUTPATIENT
Start: 2025-08-12

## 2025-08-20 ENCOUNTER — ANCILLARY PROCEDURE (OUTPATIENT)
Dept: CT IMAGING | Facility: CLINIC | Age: 20
End: 2025-08-20
Payer: COMMERCIAL

## 2025-08-20 DIAGNOSIS — K55.1 SUPERIOR MESENTERIC ARTERY SYNDROME: ICD-10-CM

## 2025-08-20 DIAGNOSIS — I87.1 NUTCRACKER PHENOMENON OF RENAL VEIN: ICD-10-CM

## 2025-08-20 PROCEDURE — 74174 CTA ABD&PLVS W/CONTRAST: CPT | Mod: GC | Performed by: RADIOLOGY

## 2025-08-20 RX ORDER — IOPAMIDOL 755 MG/ML
67 INJECTION, SOLUTION INTRAVASCULAR ONCE
Status: COMPLETED | OUTPATIENT
Start: 2025-08-20 | End: 2025-08-20

## 2025-08-20 RX ADMIN — IOPAMIDOL 67 ML: 755 INJECTION, SOLUTION INTRAVASCULAR at 07:16

## (undated) DEVICE — ENDO BITE BLOCK ADULT OLYMPUS LATEX FREE MAJ-1632

## (undated) DEVICE — ENDO FORCEP ENDOJAW BIOPSY 2.8MMX160CM FB-220K

## (undated) DEVICE — KIT ENDO TURNOVER/PROCEDURE W/CLEAN A SCOPE LINERS 103888

## (undated) DEVICE — CUFF FINGER CLEARSIGHT SMALL CSCS

## (undated) RX ORDER — NITROGLYCERIN 0.4 MG/1
TABLET SUBLINGUAL
Status: DISPENSED
Start: 2024-05-08

## (undated) RX ORDER — SODIUM CHLORIDE 9 MG/ML
INJECTION, SOLUTION INTRAVENOUS
Status: DISPENSED
Start: 2024-05-08

## (undated) RX ORDER — FENTANYL CITRATE 50 UG/ML
INJECTION, SOLUTION INTRAMUSCULAR; INTRAVENOUS
Status: DISPENSED
Start: 2024-06-13

## (undated) RX ORDER — REGADENOSON 0.08 MG/ML
INJECTION, SOLUTION INTRAVENOUS
Status: DISPENSED
Start: 2023-11-29